# Patient Record
Sex: FEMALE | Race: WHITE | Employment: UNEMPLOYED | URBAN - METROPOLITAN AREA
[De-identification: names, ages, dates, MRNs, and addresses within clinical notes are randomized per-mention and may not be internally consistent; named-entity substitution may affect disease eponyms.]

---

## 2018-07-24 ENCOUNTER — HOSPITAL ENCOUNTER (OUTPATIENT)
Dept: SLEEP CENTER | Age: 53
Discharge: HOME | End: 2018-07-26
Payer: COMMERCIAL

## 2018-07-24 PROCEDURE — 95810 POLYSOM 6/> YRS 4/> PARAM: CPT

## 2018-11-06 ENCOUNTER — HOSPITAL ENCOUNTER (OUTPATIENT)
Dept: SLEEP CENTER | Age: 53
Discharge: HOME | End: 2018-11-08
Payer: COMMERCIAL

## 2018-11-06 PROCEDURE — 95811 POLYSOM 6/>YRS CPAP 4/> PARM: CPT

## 2019-02-13 ENCOUNTER — HOSPITAL ENCOUNTER (OUTPATIENT)
Dept: SLEEP CENTER | Age: 54
Discharge: HOME | End: 2019-02-15
Payer: COMMERCIAL

## 2019-02-13 PROCEDURE — 95811 POLYSOM 6/>YRS CPAP 4/> PARM: CPT

## 2023-01-25 PROBLEM — I73.9 PAD (PERIPHERAL ARTERY DISEASE) (CMS-HCC): Status: ACTIVE | Noted: 2023-01-25

## 2023-01-25 PROBLEM — M25.831 MASS OF JOINT OF RIGHT WRIST: Status: ACTIVE | Noted: 2023-01-25

## 2023-01-25 PROBLEM — I25.10 CAD (CORONARY ARTERY DISEASE): Status: ACTIVE | Noted: 2023-01-25

## 2023-01-25 PROBLEM — E78.5 HYPERLIPIDEMIA: Status: ACTIVE | Noted: 2023-01-25

## 2023-01-25 PROBLEM — G25.81 RESTLESS LEGS SYNDROME: Status: ACTIVE | Noted: 2023-01-25

## 2023-01-25 PROBLEM — B00.9 HSV-2 INFECTION: Status: ACTIVE | Noted: 2023-01-25

## 2023-01-25 PROBLEM — M19.079 1ST MTP ARTHRITIS: Status: ACTIVE | Noted: 2023-01-25

## 2023-01-25 PROBLEM — M25.561 ARTHRALGIA OF RIGHT KNEE: Status: ACTIVE | Noted: 2023-01-25

## 2023-01-25 PROBLEM — I51.7 LVH (LEFT VENTRICULAR HYPERTROPHY): Status: ACTIVE | Noted: 2023-01-25

## 2023-01-25 PROBLEM — M67.439 GANGLION CYST OF WRIST: Status: ACTIVE | Noted: 2023-01-25

## 2023-01-25 PROBLEM — R31.9 HEMATURIA: Status: ACTIVE | Noted: 2023-01-25

## 2023-01-25 PROBLEM — I25.10 CORONARY ARTERY CALCIFICATION SEEN ON CT SCAN: Status: ACTIVE | Noted: 2023-01-25

## 2023-01-25 PROBLEM — G89.29 CHRONIC LOW BACK PAIN WITH SCIATICA: Status: ACTIVE | Noted: 2023-01-25

## 2023-01-25 PROBLEM — D72.820 LYMPHOCYTOSIS: Status: ACTIVE | Noted: 2023-01-25

## 2023-01-25 PROBLEM — I10 ESSENTIAL HYPERTENSION, BENIGN: Status: ACTIVE | Noted: 2023-01-25

## 2023-01-25 PROBLEM — E11.40 CONTROLLED TYPE 2 DIABETES MELLITUS WITH DIABETIC NEUROPATHY, WITHOUT LONG-TERM CURRENT USE OF INSULIN (MULTI): Status: ACTIVE | Noted: 2023-01-25

## 2023-01-25 PROBLEM — K59.09 CHRONIC CONSTIPATION: Status: ACTIVE | Noted: 2023-01-25

## 2023-01-25 PROBLEM — R92.8 ABNORMAL MAMMOGRAM: Status: ACTIVE | Noted: 2023-01-25

## 2023-01-25 PROBLEM — G47.00 INSOMNIA: Status: ACTIVE | Noted: 2023-01-25

## 2023-01-25 PROBLEM — F41.9 ANXIETY AND DEPRESSION: Status: ACTIVE | Noted: 2023-01-25

## 2023-01-25 PROBLEM — E53.8 B12 DEFICIENCY: Status: ACTIVE | Noted: 2023-01-25

## 2023-01-25 PROBLEM — R68.89 FORGETFULNESS: Status: ACTIVE | Noted: 2023-01-25

## 2023-01-25 PROBLEM — M54.40 CHRONIC LOW BACK PAIN WITH SCIATICA: Status: ACTIVE | Noted: 2023-01-25

## 2023-01-25 PROBLEM — R29.818 NEUROGENIC CLAUDICATION: Status: ACTIVE | Noted: 2023-01-25

## 2023-01-25 PROBLEM — J44.9 COPD (CHRONIC OBSTRUCTIVE PULMONARY DISEASE) (MULTI): Status: ACTIVE | Noted: 2023-01-25

## 2023-01-25 PROBLEM — E11.9 TYPE 2 DIABETES MELLITUS (MULTI): Status: ACTIVE | Noted: 2023-01-25

## 2023-01-25 PROBLEM — R26.9 GAIT DISTURBANCE: Status: ACTIVE | Noted: 2023-01-25

## 2023-01-25 PROBLEM — M48.061 LUMBAR SPINAL STENOSIS: Status: ACTIVE | Noted: 2023-01-25

## 2023-01-25 PROBLEM — F31.9 BIPOLAR AFFECTIVE DISORDER (MULTI): Status: ACTIVE | Noted: 2023-01-25

## 2023-01-25 PROBLEM — M72.2 PLANTAR FASCIITIS: Status: ACTIVE | Noted: 2023-01-25

## 2023-01-25 PROBLEM — G62.9 NEUROPATHY: Status: ACTIVE | Noted: 2023-01-25

## 2023-01-25 PROBLEM — G62.9 PERIPHERAL NEUROPATHY: Status: ACTIVE | Noted: 2023-01-25

## 2023-01-25 PROBLEM — F32.A ANXIETY AND DEPRESSION: Status: ACTIVE | Noted: 2023-01-25

## 2023-01-25 RX ORDER — VENLAFAXINE 37.5 MG/1
37.5 TABLET ORAL DAILY
COMMUNITY
End: 2023-05-31

## 2023-01-25 RX ORDER — ALBUTEROL SULFATE 90 UG/1
1-2 AEROSOL, METERED RESPIRATORY (INHALATION)
COMMUNITY
End: 2023-08-21 | Stop reason: SDUPTHER

## 2023-01-25 RX ORDER — DULAGLUTIDE 1.5 MG/.5ML
1.5 INJECTION, SOLUTION SUBCUTANEOUS
COMMUNITY
Start: 2019-08-05 | End: 2023-09-13 | Stop reason: SDUPTHER

## 2023-01-25 RX ORDER — GUAIFENESIN 600 MG/1
600-1200 TABLET, EXTENDED RELEASE ORAL 2 TIMES DAILY PRN
COMMUNITY
Start: 2021-08-25 | End: 2023-05-31

## 2023-01-25 RX ORDER — ISOPROPYL ALCOHOL 70 ML/100ML
SWAB TOPICAL 3 TIMES DAILY
COMMUNITY
End: 2023-05-31

## 2023-01-25 RX ORDER — LANCETS
EACH MISCELLANEOUS 3 TIMES DAILY
COMMUNITY
End: 2024-05-06 | Stop reason: SDUPTHER

## 2023-01-25 RX ORDER — METFORMIN HYDROCHLORIDE 500 MG/1
500 TABLET ORAL DAILY
COMMUNITY
End: 2023-04-27 | Stop reason: SDUPTHER

## 2023-01-25 RX ORDER — LIDOCAINE 50 MG/G
PATCH TOPICAL
COMMUNITY
Start: 2019-08-03 | End: 2023-07-19 | Stop reason: SDUPTHER

## 2023-01-25 RX ORDER — ASPIRIN 81 MG/1
1 TABLET ORAL DAILY
COMMUNITY
Start: 2019-08-05

## 2023-01-25 RX ORDER — QUETIAPINE FUMARATE 50 MG/1
25 TABLET, FILM COATED ORAL NIGHTLY PRN
COMMUNITY
Start: 2022-07-25 | End: 2024-03-20 | Stop reason: WASHOUT

## 2023-01-25 RX ORDER — BLOOD SUGAR DIAGNOSTIC
STRIP MISCELLANEOUS 4 TIMES DAILY
COMMUNITY
Start: 2022-01-21 | End: 2023-03-20 | Stop reason: SDUPTHER

## 2023-01-25 RX ORDER — LOSARTAN POTASSIUM 100 MG/1
1 TABLET ORAL DAILY
COMMUNITY
Start: 2019-08-05 | End: 2023-09-13 | Stop reason: SDUPTHER

## 2023-01-25 RX ORDER — BUPRENORPHINE HYDROCHLORIDE AND NALOXONE HYDROCHLORIDE DIHYDRATE 2; .5 MG/1; MG/1
1 TABLET SUBLINGUAL 2 TIMES DAILY
COMMUNITY
End: 2023-09-28 | Stop reason: ALTCHOICE

## 2023-01-25 RX ORDER — ALPRAZOLAM 0.5 MG/1
0.5 TABLET, ORALLY DISINTEGRATING ORAL 3 TIMES DAILY PRN
COMMUNITY
End: 2024-05-10

## 2023-01-25 RX ORDER — ATORVASTATIN CALCIUM 80 MG/1
1 TABLET, FILM COATED ORAL DAILY
COMMUNITY
Start: 2018-10-23 | End: 2023-08-23 | Stop reason: SDUPTHER

## 2023-01-25 RX ORDER — LANOLIN ALCOHOL/MO/W.PET/CERES
1 CREAM (GRAM) TOPICAL DAILY
COMMUNITY
Start: 2020-04-06

## 2023-01-25 RX ORDER — ACETAMINOPHEN 500 MG
1 TABLET ORAL NIGHTLY
COMMUNITY
Start: 2019-11-25

## 2023-01-25 RX ORDER — GABAPENTIN 800 MG/1
800 TABLET ORAL 4 TIMES DAILY
COMMUNITY
Start: 2015-08-12 | End: 2023-09-15 | Stop reason: SDUPTHER

## 2023-01-25 RX ORDER — POLYETHYLENE GLYCOL 3350 17 G/17G
POWDER, FOR SOLUTION ORAL
COMMUNITY
End: 2023-05-31

## 2023-01-25 RX ORDER — VENLAFAXINE HYDROCHLORIDE 150 MG/1
1 CAPSULE, EXTENDED RELEASE ORAL DAILY
COMMUNITY
Start: 2020-03-27 | End: 2023-09-28 | Stop reason: ALTCHOICE

## 2023-01-25 RX ORDER — TIOTROPIUM BROMIDE INHALATION SPRAY 3.12 UG/1
2 SPRAY, METERED RESPIRATORY (INHALATION)
COMMUNITY
End: 2023-03-14 | Stop reason: SDUPTHER

## 2023-01-25 RX ORDER — ROPINIROLE 1 MG/1
2 TABLET, FILM COATED ORAL NIGHTLY
COMMUNITY
Start: 2020-05-19 | End: 2023-12-19 | Stop reason: SDUPTHER

## 2023-01-25 RX ORDER — ALBUTEROL SULFATE 0.83 MG/ML
1 SOLUTION RESPIRATORY (INHALATION) EVERY 4 HOURS PRN
COMMUNITY
Start: 2018-11-18 | End: 2024-05-23 | Stop reason: SDUPTHER

## 2023-03-08 ENCOUNTER — APPOINTMENT (OUTPATIENT)
Dept: PRIMARY CARE | Facility: CLINIC | Age: 58
End: 2023-03-08
Payer: MEDICARE

## 2023-03-14 DIAGNOSIS — J44.9 CHRONIC OBSTRUCTIVE PULMONARY DISEASE, UNSPECIFIED COPD TYPE (MULTI): ICD-10-CM

## 2023-03-14 RX ORDER — TIOTROPIUM BROMIDE INHALATION SPRAY 3.12 UG/1
2 SPRAY, METERED RESPIRATORY (INHALATION) DAILY
Qty: 8 EACH | Refills: 3 | Status: SHIPPED | OUTPATIENT
Start: 2023-03-14 | End: 2023-09-12

## 2023-03-20 DIAGNOSIS — J44.9 CHRONIC OBSTRUCTIVE PULMONARY DISEASE, UNSPECIFIED COPD TYPE (MULTI): ICD-10-CM

## 2023-03-20 DIAGNOSIS — E11.9 TYPE 2 DIABETES MELLITUS WITHOUT COMPLICATION, UNSPECIFIED WHETHER LONG TERM INSULIN USE (MULTI): ICD-10-CM

## 2023-03-20 RX ORDER — NEBULIZER AND COMPRESSOR
EACH MISCELLANEOUS
COMMUNITY
End: 2023-03-20 | Stop reason: SDUPTHER

## 2023-03-20 RX ORDER — NEBULIZER AND COMPRESSOR
EACH MISCELLANEOUS
Qty: 1 EACH | Refills: 0 | Status: SHIPPED | OUTPATIENT
Start: 2023-03-20 | End: 2024-05-09 | Stop reason: SDUPTHER

## 2023-03-20 RX ORDER — BLOOD SUGAR DIAGNOSTIC
STRIP MISCELLANEOUS
Qty: 100 STRIP | Refills: 3 | Status: SHIPPED | OUTPATIENT
Start: 2023-03-20 | End: 2023-07-17 | Stop reason: SDUPTHER

## 2023-03-28 ENCOUNTER — APPOINTMENT (OUTPATIENT)
Dept: PRIMARY CARE | Facility: CLINIC | Age: 58
End: 2023-03-28
Payer: MEDICARE

## 2023-04-04 ENCOUNTER — APPOINTMENT (OUTPATIENT)
Dept: PRIMARY CARE | Facility: CLINIC | Age: 58
End: 2023-04-04
Payer: MEDICARE

## 2023-04-18 ENCOUNTER — TELEPHONE (OUTPATIENT)
Dept: PRIMARY CARE | Facility: CLINIC | Age: 58
End: 2023-04-18
Payer: MEDICARE

## 2023-04-18 NOTE — TELEPHONE ENCOUNTER
Pt. Called said is having issues with transportation,  she would like to know if she can been seen first thing in the morning.  Please Advice.

## 2023-04-18 NOTE — TELEPHONE ENCOUNTER
Pt called asking for an order for annual lung cancer screening. She received a letter in the mail notifying her she is due for one.

## 2023-04-24 ENCOUNTER — TELEPHONE (OUTPATIENT)
Dept: PRIMARY CARE | Facility: CLINIC | Age: 58
End: 2023-04-24
Payer: MEDICARE

## 2023-04-25 ENCOUNTER — TELEPHONE (OUTPATIENT)
Dept: PRIMARY CARE | Facility: CLINIC | Age: 58
End: 2023-04-25
Payer: MEDICARE

## 2023-04-25 NOTE — TELEPHONE ENCOUNTER
Pt. Called said Dr. Bauer Sent her Antibiotics last time for a bump on her but cheeks, said if he can send them again.  She has an appt. May 11th at 11:40am    Temporary Phone#  [phone]    Please Advice.

## 2023-04-26 RX ORDER — METFORMIN HYDROCHLORIDE 500 MG/1
500 TABLET ORAL DAILY
Qty: 90 TABLET | Refills: 3 | Status: CANCELLED | OUTPATIENT
Start: 2023-04-26

## 2023-04-27 ENCOUNTER — TELEMEDICINE (OUTPATIENT)
Dept: PRIMARY CARE | Facility: CLINIC | Age: 58
End: 2023-04-27
Payer: MEDICARE

## 2023-04-27 DIAGNOSIS — E11.9 TYPE 2 DIABETES MELLITUS WITHOUT COMPLICATION, WITHOUT LONG-TERM CURRENT USE OF INSULIN (MULTI): ICD-10-CM

## 2023-04-27 DIAGNOSIS — B00.9 HSV-2 INFECTION: Primary | ICD-10-CM

## 2023-04-27 PROCEDURE — 99213 OFFICE O/P EST LOW 20 MIN: CPT | Performed by: FAMILY MEDICINE

## 2023-04-27 RX ORDER — VALACYCLOVIR HYDROCHLORIDE 1 G/1
1000 TABLET, FILM COATED ORAL 3 TIMES DAILY
Qty: 21 TABLET | Refills: 0 | Status: SHIPPED | OUTPATIENT
Start: 2023-04-27 | End: 2023-11-07 | Stop reason: SDUPTHER

## 2023-04-27 RX ORDER — METFORMIN HYDROCHLORIDE 1000 MG/1
1000 TABLET ORAL DAILY
Qty: 90 TABLET | Refills: 3 | Status: SHIPPED | OUTPATIENT
Start: 2023-04-27 | End: 2023-06-30 | Stop reason: SDUPTHER

## 2023-04-27 NOTE — PROGRESS NOTES
Subjective   Patient ID: Rachael Ruiz is a 57 y.o. female who presents for No chief complaint on file..    HPI     She developed a painful, crusted bump in the area of her upper buttocks at midline 3 to 4 days ago.  There has been a small amount of drainage from the area.  No fever.  She has a history of similar symptoms in the past due to cutaneous HSV infection.  We had initially seen her in 2019 for similar symptoms and she had a skin culture done at that time which was positive for HSV type II.  Since then she has had 3-4 similar episodes, which have improved with treatment with valacyclovir    Review of Systems   Skin:  Positive for rash.       Objective   There were no vitals taken for this visit.    Physical Exam  Today's visit was virtual.  Physical exam was not done due to this      Assessment/Plan   Problem List Items Addressed This Visit          Medium    HSV-2 infection - Primary    Relevant Medications    valACYclovir (Valtrex) 1 gram tablet    Type 2 diabetes mellitus (CMS/[facility])    Relevant Medications    metFORMIN (Glucophage) 1,000 mg tablet   She presents today with a 3 to 4-day history of a tender crusted rash involving upper buttocks area at midline.  I was not able to examine the area today since the visit was done virtually, however she does have a history of similar symptoms in the past due to recurrent cutaneous HSV in the area.  This has responded well to treatment with valacyclovir in the past.  We will treat with valacyclovir 1 g 3 times daily for 7 days.  Advised to follow-up in the next week if not improved  She is also requesting a refill on metformin for diabetes today.  Refill was given.  She has a follow-up scheduled next month for a CPE and diabetes recheck, and she will keep this appointment

## 2023-05-11 ENCOUNTER — APPOINTMENT (OUTPATIENT)
Dept: PRIMARY CARE | Facility: CLINIC | Age: 58
End: 2023-05-11
Payer: MEDICARE

## 2023-05-16 ENCOUNTER — APPOINTMENT (OUTPATIENT)
Dept: PRIMARY CARE | Facility: CLINIC | Age: 58
End: 2023-05-16
Payer: MEDICARE

## 2023-05-28 ASSESSMENT — ENCOUNTER SYMPTOMS
SPEECH DIFFICULTY: 0
POLYDIPSIA: 1
CONFUSION: 0
HEADACHES: 0
POLYPHAGIA: 0
VISUAL CHANGE: 1
SWEATS: 1
BLACKOUTS: 0
SEIZURES: 0
WEAKNESS: 1
HUNGER: 0
FATIGUE: 0
TREMORS: 0
WEIGHT LOSS: 0
BLURRED VISION: 0
DIZZINESS: 0
NERVOUS/ANXIOUS: 1

## 2023-05-31 ENCOUNTER — APPOINTMENT (OUTPATIENT)
Dept: PRIMARY CARE | Facility: CLINIC | Age: 58
End: 2023-05-31
Payer: MEDICARE

## 2023-05-31 PROBLEM — G62.9 NEUROPATHY: Status: RESOLVED | Noted: 2023-01-25 | Resolved: 2023-05-31

## 2023-05-31 ASSESSMENT — ENCOUNTER SYMPTOMS
WEIGHT LOSS: 0
SPEECH DIFFICULTY: 0
TREMORS: 0
DIZZINESS: 0
POLYPHAGIA: 0
BLACKOUTS: 0
HUNGER: 0
FATIGUE: 0
BLURRED VISION: 0
SEIZURES: 0
SWEATS: 1
POLYDIPSIA: 1
NERVOUS/ANXIOUS: 1
POLYPHAGIA: 0
POLYDIPSIA: 1
WEIGHT LOSS: 0
CONFUSION: 0
TREMORS: 0
FATIGUE: 0
BLACKOUTS: 0
WEAKNESS: 1
SWEATS: 1
VISUAL CHANGE: 1
CONFUSION: 0
VISUAL CHANGE: 1
SEIZURES: 0
HUNGER: 0
BLURRED VISION: 0
WEAKNESS: 1
HEADACHES: 0
NERVOUS/ANXIOUS: 1
DIZZINESS: 0
HEADACHES: 0
SPEECH DIFFICULTY: 0

## 2023-06-06 ENCOUNTER — TELEPHONE (OUTPATIENT)
Dept: PRIMARY CARE | Facility: CLINIC | Age: 58
End: 2023-06-06

## 2023-06-06 ENCOUNTER — APPOINTMENT (OUTPATIENT)
Dept: PRIMARY CARE | Facility: CLINIC | Age: 58
End: 2023-06-06
Payer: MEDICARE

## 2023-06-06 ENCOUNTER — TELEMEDICINE (OUTPATIENT)
Dept: PRIMARY CARE | Facility: CLINIC | Age: 58
End: 2023-06-06
Payer: MEDICARE

## 2023-06-06 DIAGNOSIS — U07.1 COVID-19 VIRUS INFECTION: Primary | ICD-10-CM

## 2023-06-06 PROCEDURE — 99442 PR PHYS/QHP TELEPHONE EVALUATION 11-20 MIN: CPT | Performed by: FAMILY MEDICINE

## 2023-06-06 ASSESSMENT — ENCOUNTER SYMPTOMS
BLACKOUTS: 0
CONFUSION: 0
BLURRED VISION: 0
POLYPHAGIA: 0
SEIZURES: 0
TREMORS: 0
HUNGER: 0
COUGH: 1
VISUAL CHANGE: 1
SWEATS: 1
HEADACHES: 0
WEAKNESS: 1
SPEECH DIFFICULTY: 0
WEIGHT LOSS: 0
NERVOUS/ANXIOUS: 1
POLYDIPSIA: 1
HEADACHES: 0
DIZZINESS: 0
FATIGUE: 0

## 2023-06-06 NOTE — PROGRESS NOTES
Subjective   Patient ID: Rachael Ruiz is a 57 y.o. female who presents for Covid-19 Testing.    URI   This is a new problem. The current episode started in the past 7 days. There has been no fever. Associated symptoms include congestion and coughing. Pertinent negatives include no headaches or sneezing.      Here today to discuss positive COVID test  Her symptoms started 4 to 5 days ago.  Main symptoms have been nasal congestion and cough.  She had a 101 degree fever, but states that the fever has resolved.  She otherwise denies any nasal drainage, wheezing, shortness of breath, chest pain, leg edema, headache or muscle pain.  Symptoms are starting to improve  She has been vaccinated against COVID but did not receive the most recent booster  Past medical history is significant for COPD and diabetes mellitus.  She is a smoker          Review of Systems   HENT:  Positive for congestion. Negative for sneezing.    Respiratory:  Positive for cough.    Neurological:  Negative for headaches.       Objective   There were no vitals taken for this visit.    Physical Exam today's visit was done over the telephone.  Exam was not done due to this    Assessment/Plan   Problem List Items Addressed This Visit    None  Visit Diagnoses       COVID-19 virus infection    -  Primary        She is currently on day 4-5 of symptoms.  She states that her symptoms have been mild and improving.  She is still within the antiviral treatment window, and is at higher risk of more severe infection based on her history including diabetes mellitus, COPD and smoking status.  We discussed antiviral treatment with Paxlovid in detail, including medication interactions and efficacy.  After discussing this, she declines treatment.  I did recommend that she go to the ER if any chest pain, shortness of breath, leg swelling or worsening symptoms, and follow-up with us in the next 3 to 5 days if not improving.  We also discussed home quarantine  guidelines.  Total time spent today on telephone was 11 to 12 minutes

## 2023-06-06 NOTE — TELEPHONE ENCOUNTER
Pt called to let you know she rescheduled her appt for today because she has covid. She tested positive with an at home test last Friday. She states she is overall feeling ok but has some chest congestion and coughing up phlegm.

## 2023-06-15 ENCOUNTER — APPOINTMENT (OUTPATIENT)
Dept: PRIMARY CARE | Facility: CLINIC | Age: 58
End: 2023-06-15
Payer: MEDICARE

## 2023-06-15 ENCOUNTER — PATIENT OUTREACH (OUTPATIENT)
Dept: PRIMARY CARE | Facility: CLINIC | Age: 58
End: 2023-06-15

## 2023-06-15 DIAGNOSIS — M54.50 CHRONIC LOW BACK PAIN, UNSPECIFIED BACK PAIN LATERALITY, UNSPECIFIED WHETHER SCIATICA PRESENT: ICD-10-CM

## 2023-06-15 DIAGNOSIS — E11.40 CONTROLLED TYPE 2 DIABETES MELLITUS WITH DIABETIC NEUROPATHY, WITHOUT LONG-TERM CURRENT USE OF INSULIN (MULTI): ICD-10-CM

## 2023-06-15 DIAGNOSIS — G89.29 CHRONIC LOW BACK PAIN, UNSPECIFIED BACK PAIN LATERALITY, UNSPECIFIED WHETHER SCIATICA PRESENT: ICD-10-CM

## 2023-06-15 DIAGNOSIS — J44.9 CHRONIC OBSTRUCTIVE PULMONARY DISEASE, UNSPECIFIED COPD TYPE (MULTI): ICD-10-CM

## 2023-06-15 DIAGNOSIS — R29.6 FREQUENT FALLS: ICD-10-CM

## 2023-06-15 DIAGNOSIS — E11.9 TYPE 2 DIABETES MELLITUS WITHOUT COMPLICATION, WITHOUT LONG-TERM CURRENT USE OF INSULIN (MULTI): Primary | ICD-10-CM

## 2023-06-15 DIAGNOSIS — I10 HYPERTENSION, UNSPECIFIED TYPE: ICD-10-CM

## 2023-06-15 NOTE — PROGRESS NOTES
Discharge Facility: [facility]  Discharge Diagnosis: HTN, B12 deficiency, Anxiety, Depression, CAD, Chronic low back pain, COPD, DM2, RLS  Admission Date: 6/12/2023  Discharge Date: 6/14/2023    PCP Appointment Date: 6/22/2023 14:20  Specialist Appointment Date: Dr. Coyel, Orthopedics to be scheduled  Hospital Encounter and Summary: Linked     Wrap Up  Wrap Up Additional Comments: Admitted for hypertensive urgency, frequent falls, Covid infection and R/O esophageal injury. HTN urgency resolved. CT chest positve for esophageal injury, repeat imaging ruled out. Covid positive, asymptomatic. Pt to FU with GI for possible endoscopy once Covid resolved. Pt also diagnosed with chronic low back pain. Ortho referral made for back pain and [facility] PT/OT ordered for frequent falls. Pt to conitnue on depression meds for anixety. (6/15/2023 12:21 PM)

## 2023-06-20 ENCOUNTER — TELEPHONE (OUTPATIENT)
Dept: PRIMARY CARE | Facility: CLINIC | Age: 58
End: 2023-06-20

## 2023-06-20 ENCOUNTER — APPOINTMENT (OUTPATIENT)
Dept: PHARMACY | Facility: HOSPITAL | Age: 58
End: 2023-06-20
Payer: MEDICARE

## 2023-06-20 NOTE — PROGRESS NOTES
Subjective   Patient ID: Rachael Ruiz is a 57 y.o. female who presents for No chief complaint on file..    Referring Provider: Pineda Bauer DO         Objective     There were no vitals taken for this visit.     Labs  Lab Results   Component Value Date    BILITOT 0.5 06/14/2023    CALCIUM 8.4 (L) 06/14/2023    CO2 21 06/14/2023     (H) 06/14/2023    CREATININE 0.72 06/14/2023    GLUCOSE 152 (H) 06/14/2023    ALKPHOS 81 06/14/2023    K 3.2 (L) 06/14/2023    PROT 6.8 06/14/2023     06/14/2023    AST 18 06/14/2023    ALT 14 06/14/2023    BUN 12 06/14/2023    ANIONGAP 11 06/14/2023    MG 1.85 06/14/2023    PHOS CANCELED 06/13/2023    ALBUMIN 3.6 06/14/2023    GFRF >90 06/14/2023    GFRMALE CANCELED 06/13/2023     Lab Results   Component Value Date    TRIG 313 (H) 12/05/2022    CHOL 168 12/05/2022    HDL 26.1 (A) 12/05/2022     Lab Results   Component Value Date    HGBA1C 7.2 (A) 06/13/2023    HGBA1C 6.3 (A) 12/05/2022    HGBA1C 6.6 08/01/2019       Current Outpatient Medications on File Prior to Visit   Medication Sig Dispense Refill    albuterol 2.5 mg /3 mL (0.083 %) nebulizer solution Take 1 ampule by nebulization every 4 (four) hours if needed.      albuterol 90 mcg/actuation inhaler Inhale 1-2 puffs. Every 4-6 hours if needed      ALPRAZolam (Niravam) 0.5 mg disintegrating tablet Take 1 tablet (0.5 mg) by mouth 3 times a day as needed. Allow to dissolve      aspirin 81 mg EC tablet Take 1 tablet (81 mg) by mouth once daily.      atorvastatin (Lipitor) 80 mg tablet Take 1 tablet (80 mg) by mouth once daily.      buprenorphine-naloxone (Suboxone) 2-0.5 mg SL tablet Place 1 tablet under the tongue twice a day.      cholecalciferol (Vitamin D-3) 50 mcg (2,000 unit) capsule Take 1 capsule (50 mcg) by mouth once daily at bedtime.      cyanocobalamin (Vitamin B-12) 1,000 mcg tablet Take 1 tablet (1,000 mcg) by mouth once daily.      docosahexaenoic acid/epa (FISH OIL ORAL) Take 1,000 mg by mouth in the  morning and at bedtime.      dulaglutide (Trulicity) 1.5 mg/0.5 mL pen injector Inject 1.5 mg under the skin 1 (one) time per week.      gabapentin (Neurontin) 800 mg tablet Take 1 tablet (800 mg) by mouth 4 times a day.      lancets misc in the morning, at noon, and at bedtime.      lidocaine (Lidoderm) 5 % patch APPLY 1 PATCH TO THE AFFECTED AREA AND LEAVE IN PLACE FOR 12 HOURS, THEN REMOVE AND LEAVE OFF FOR 12 HOURS.      losartan (Cozaar) 100 mg tablet Take 1 tablet (100 mg) by mouth once daily.      metFORMIN (Glucophage) 1,000 mg tablet Take 1 tablet (1,000 mg) by mouth once daily. 90 tablet 3    nebulizer and compressor device Use as directed. 1 each 0    OneTouch Ultra Test strip TEST 4 TIMES DAILY 100 strip 3    QUEtiapine (SEROquel) 50 mg tablet Take 1 tablet (50 mg) by mouth as needed at bedtime.      rOPINIRole (Requip) 1 mg tablet Take 2 tablets (2 mg) by mouth once daily at bedtime.      tiotropium (Spiriva Respimat) 2.5 mcg/actuation inhaler Inhale 2 puffs once daily. 90 day supply 8 each 3    venlafaxine XR (Effexor-XR) 150 mg 24 hr capsule Take 1 capsule (150 mg) by mouth once daily.       No current facility-administered medications on file prior to visit.      Patient Goals  - Fasting B - 130 mg/dL  - Postprandial BG: less than 180 mg/dL  - A1c: less than 7%    SMBG      Diet      Exercise    Assessment/Plan   Plan  Diabetes is controlled per last A1c of 6.6%, which is an decrease from 9.3%, and below goal A1c of 7.0%  Continue all current diabetes medications  A1c lab ordered.    Next PCP appointment 23  Follow up with PharmD after A1c lab, 23    Tavares Moise, Tony    Continue all meds under the continuation of care with the referring provider and clinical pharmacy team.

## 2023-06-20 NOTE — TELEPHONE ENCOUNTER
Lizz, a nurse, called regarding pt. She is currently at the pt's house and stated pt's BP is 150/104 currently. Pt does not have any chest pain or headaches but does admit to sweats. She was seen in the hospital 6/12/23 for back pain but was hypertensive while there and they had trouble managing BP. She was started on Hydralazine 50mg but she has not started in since being discharged, she is still taking Losartan for BP.     Nurse notified that she has not been seen in the office in greater than 6 months and cannot adjust meds until seeing pt. If BP does get higher or she does develop symptoms, pt is aware to go to ER. Pt advised to keep follow up appt with you on Thursday and I expressed importance of coming to this appt to follow up.

## 2023-06-22 ENCOUNTER — APPOINTMENT (OUTPATIENT)
Dept: PRIMARY CARE | Facility: CLINIC | Age: 58
End: 2023-06-22
Payer: MEDICARE

## 2023-06-22 PROBLEM — E11.40 CONTROLLED TYPE 2 DIABETES MELLITUS WITH DIABETIC NEUROPATHY, WITHOUT LONG-TERM CURRENT USE OF INSULIN (MULTI): Status: RESOLVED | Noted: 2023-01-25 | Resolved: 2023-06-22

## 2023-06-22 PROBLEM — S22.000A COMPRESSION FRACTURE OF THORACIC VERTEBRA (MULTI): Status: ACTIVE | Noted: 2023-06-22

## 2023-06-22 ASSESSMENT — ENCOUNTER SYMPTOMS
CONFUSION: 0
HEADACHES: 0
VISUAL CHANGE: 1
SEIZURES: 0
BLACKOUTS: 0
POLYDIPSIA: 1
HUNGER: 0
SWEATS: 1
POLYPHAGIA: 0
NERVOUS/ANXIOUS: 1
DIZZINESS: 0
BLURRED VISION: 0
WEAKNESS: 1
SPEECH DIFFICULTY: 0
TREMORS: 0
WEIGHT LOSS: 0
FATIGUE: 0

## 2023-06-22 NOTE — PROGRESS NOTES
Subjective   Patient ID: Rachael Ruiz is a 57 y.o. female who presents for No chief complaint on file..    Diabetes  She has type 2 diabetes mellitus. No MedicAlert identification noted. The initial diagnosis of diabetes was made 40 years ago. Hypoglycemia symptoms include mood changes, nervousness/anxiousness and sweats. Pertinent negatives for hypoglycemia include no confusion, dizziness, headaches, hunger, pallor, seizures, sleepiness, speech difficulty or tremors. Associated symptoms include foot paresthesias, polydipsia, visual change and weakness. Pertinent negatives for diabetes include no blurred vision, no chest pain, no fatigue, no polyphagia, no polyuria and no weight loss. Pertinent negatives for hypoglycemia complications include no blackouts, no hospitalization, no nocturnal hypoglycemia, no required assistance and no required glucagon injection. Symptoms are worsening. Diabetic complications include peripheral neuropathy, PVD and retinopathy. Pertinent negatives for diabetic complications include no CVA, heart disease, impotence or nephropathy. Risk factors for coronary artery disease include dyslipidemia, family history, hypertension, obesity, sedentary lifestyle, stress and tobacco exposure. Current diabetic treatment includes oral agent (dual therapy). She is compliant with treatment none of the time. Her weight is stable. She is following a generally unhealthy diet. When asked about meal planning, she reported none. She has not had a previous visit with a dietitian. She never participates in exercise. She monitors blood glucose at home 3-4 x per day. She monitors urine at home <1 x per month. Blood glucose monitoring compliance is adequate. Her home blood glucose trend is fluctuating dramatically. Her breakfast blood glucose is taken between 7-8 am. Her breakfast blood glucose range is generally <70 mg/dl. Her lunch blood glucose is taken between 11-12 pm. Her lunch blood glucose range is  generally 110-130 mg/dl. Her dinner blood glucose is taken between 4-5 pm. Her dinner blood glucose range is generally  mg/dl. Her overall blood glucose range is 70-90 mg/dl. She sees a podiatrist.Eye exam is current.        She is here today for routine follow-up, annual wellness visit, and also follow-up hospitalization    Past medical history is as follows:  Diabetes mellitus: Currently taking metformin 1000 mg twice daily and Trulicity 1.5 mg weekly.  Her most recent A1c done 6/13/2023 was 7.2%.  A1c done prior to this in December 2022 had been 6.3%  Hyperlipidemia: Currently takes atorvastatin 80 mg daily.  Her last lipid panel done 12/5/2022 showed total cholesterol 168, HDL 26.1, LDL 79, triglycerides 313  Hypertension: Currently takes losartan 100 mg daily and HCTZ 25 mg daily  Vitamin B12 deficiency currently on monthly B12 injections.  Her last vitamin B-12 level done 12/5/2022 was greater than 2000  She follows with psychiatry due to history of bipolar disorder  Follows with cardiology for LVH and history of severe coronary artery calcification seen on previous low-dose CT scan.  She last saw her cardiologist in November and a stress test had been recommended  COPD: Currently taking Spiriva  She has chronic low back pain with history of laminectomy in April 2019  Follows with neurology due to chronic neuropathy involving bilateral lower extremities    She is a smoker.  Has smoked approximately 1 pack/day for 40 years  Colon cancer screening: Had a negative Cologuard 9/2021  Last mammogram was done 12/2021.  This was category 2  Lung cancer screening: No worrisome lung nodules seen on CT chest 6/13/2023  Vaccinations: Received tetanus vaccine 2021.  Received PPSV23 vaccine in 2021      She was recently hospitalized 6/12 through 6/14/2023  Prior to hospitalization she had presented to the ED with 2 days of sweating, as well as a fall 2 days prior.  Labs done in the ED showed elevated white blood cell  count at 14.6.  She had a positive COVID swab.  CT head, lumbar spine and cervical spine were unremarkable  CT of the thoracic spine showed mild compression deformities of the T3 and T4 vertebral bodies.  They had planned on discharging her home with recommendations to follow-up with PCP and orthopedics for the compression deformity, however she had then became more diaphoretic, blood pressure had increased, and she developed worsening abdominal pain.  A CTA of the chest, abdomen and pelvis was then ordered.  This showed a subtle paravertebral abnormality T1-T3 potentially indicating hematoma in the setting of trauma, however esophageal injury was not excluded.  She was admitted for further evaluation  These findings were discussed with CT surgery and GI and an esophagram was recommended.  She had a barium swallow done on 6/13 which did not show any evidence of contrast extra vacation or esophageal perforation  She had an MRI of her thoracic spine done on 6/13 which showed prevertebral edema at see 7-T4, nonspecific.  Major ligamentous structures intact.  Endplate edema in the lower cervical and upper thoracic spine is favored to be degenerative, however microtrabecular injury cannot be excluded  During her stay she was prophylactically treated with fluconazole, cefepime and Zosyn.  GI had recommended outpatient follow-up for possible EGD several weeks after discharge  On discharge it was recommended that she follow-up with PCP, GI, as well as orthopedic surgery        Review of Systems   Constitutional:  Negative for fatigue and weight loss.   Eyes:  Negative for blurred vision.   Cardiovascular:  Negative for chest pain.   Endocrine: Positive for polydipsia. Negative for polyphagia and polyuria.   Genitourinary:  Negative for impotence.   Skin:  Negative for pallor.   Neurological:  Positive for weakness. Negative for dizziness, tremors, seizures, speech difficulty and headaches.   Psychiatric/Behavioral:   Negative for confusion. The patient is nervous/anxious.        Objective   There were no vitals taken for this visit.    Physical Exam    Assessment/Plan   Problem List Items Addressed This Visit          Medium    Compression fracture of thoracic vertebra (CMS/HCC)    Essential hypertension, benign    Hyperlipidemia    Type 2 diabetes mellitus (CMS/HCC)     Other Visit Diagnoses       Routine health maintenance    -  Primary          #1 health maintenance  Up-to-date on colon cancer screening  She is due for a screening mammogram.  This was ordered today  Up-to-date on lung cancer screening

## 2023-06-23 ENCOUNTER — TELEPHONE (OUTPATIENT)
Dept: PRIMARY CARE | Facility: CLINIC | Age: 58
End: 2023-06-23
Payer: MEDICARE

## 2023-06-23 NOTE — TELEPHONE ENCOUNTER
Neftaly a physical therapist called regarding services for pt. Pt is requesting a PT home care evaluation Monday 6/26 and Neftaly confirmed that pt can start in home nursing services.       Any questions or concerns, Neftaly's call back [phone]

## 2023-06-26 ENCOUNTER — TELEPHONE (OUTPATIENT)
Dept: PHARMACY | Facility: HOSPITAL | Age: 58
End: 2023-06-26

## 2023-06-26 ENCOUNTER — APPOINTMENT (OUTPATIENT)
Dept: PHARMACY | Facility: HOSPITAL | Age: 58
End: 2023-06-26
Payer: MEDICARE

## 2023-06-26 NOTE — TELEPHONE ENCOUNTER
Rachael Ruiz was referred to the [facility] program by Pineda Bauer DO. This program, facilitated through the PCI/Pharmacist consult agreement, aims to reduce future hospitalizations associated with chronic conditions through medication optimization and monitoring post-discharge, as well as in between their regularly scheduled appointments.    First Attempt: 6/26/23 at 2:00 PM  Second Attempt: 6/26/23 at 4:40 PM Patient not feeling well. Would like to reschedule for Wednesday 7/3/23 at 2pm      Please let Clinical Pharmacy team know if you have any questions.    Thank you,  Heather Cordova, PharmD

## 2023-06-27 ENCOUNTER — APPOINTMENT (OUTPATIENT)
Dept: PRIMARY CARE | Facility: CLINIC | Age: 58
End: 2023-06-27
Payer: MEDICARE

## 2023-06-28 ENCOUNTER — TELEPHONE (OUTPATIENT)
Dept: PRIMARY CARE | Facility: CLINIC | Age: 58
End: 2023-06-28

## 2023-06-28 ENCOUNTER — APPOINTMENT (OUTPATIENT)
Dept: PRIMARY CARE | Facility: CLINIC | Age: 58
End: 2023-06-28
Payer: MEDICARE

## 2023-06-28 ASSESSMENT — ENCOUNTER SYMPTOMS
SWEATS: 1
VISUAL CHANGE: 1
DIZZINESS: 0
NERVOUS/ANXIOUS: 1
VISUAL CHANGE: 1
NERVOUS/ANXIOUS: 1
TREMORS: 0
BLURRED VISION: 0
WEAKNESS: 1
POLYDIPSIA: 1
WEAKNESS: 1
POLYDIPSIA: 1
POLYDIPSIA: 1
BLACKOUTS: 0
BLACKOUTS: 0
SEIZURES: 0
CONFUSION: 0
WEAKNESS: 1
BLACKOUTS: 0
POLYPHAGIA: 0
WEIGHT LOSS: 0
SWEATS: 1
FATIGUE: 0
SPEECH DIFFICULTY: 0
CONFUSION: 0
DIZZINESS: 0
WEIGHT LOSS: 0
SEIZURES: 0
BLURRED VISION: 0
FATIGUE: 0
TREMORS: 0
SWEATS: 1
TREMORS: 0
CONFUSION: 0
HEADACHES: 0
POLYPHAGIA: 0
SPEECH DIFFICULTY: 0
WEIGHT LOSS: 0
HUNGER: 0
SEIZURES: 0
POLYPHAGIA: 0
HUNGER: 0
VISUAL CHANGE: 1
HEADACHES: 0
FATIGUE: 0
BLURRED VISION: 0
NERVOUS/ANXIOUS: 1
HUNGER: 0
HEADACHES: 0
DIZZINESS: 0
SPEECH DIFFICULTY: 0

## 2023-06-28 NOTE — TELEPHONE ENCOUNTER
Please call her.  She missed her appointment today.  She has either missed or rescheduled this appointment numerous times over the last several months, and is overdue for a follow-up.  Please make sure that she reschedules this appointment and keeps this appointment.  It is hard to take care of her if she does not come in as directed on a regular basis

## 2023-06-30 ENCOUNTER — OFFICE VISIT (OUTPATIENT)
Dept: PRIMARY CARE | Facility: CLINIC | Age: 58
End: 2023-06-30
Payer: MEDICARE

## 2023-06-30 VITALS
DIASTOLIC BLOOD PRESSURE: 78 MMHG | WEIGHT: 160 LBS | RESPIRATION RATE: 20 BRPM | HEART RATE: 82 BPM | SYSTOLIC BLOOD PRESSURE: 155 MMHG | HEIGHT: 60 IN | BODY MASS INDEX: 31.41 KG/M2 | OXYGEN SATURATION: 96 %

## 2023-06-30 DIAGNOSIS — Z12.31 BREAST CANCER SCREENING BY MAMMOGRAM: ICD-10-CM

## 2023-06-30 DIAGNOSIS — Z72.0 TOBACCO USE: ICD-10-CM

## 2023-06-30 DIAGNOSIS — E53.8 B12 DEFICIENCY: ICD-10-CM

## 2023-06-30 DIAGNOSIS — I10 ESSENTIAL HYPERTENSION, BENIGN: ICD-10-CM

## 2023-06-30 DIAGNOSIS — S22.000A COMPRESSION FRACTURE OF THORACIC VERTEBRA, UNSPECIFIED THORACIC VERTEBRAL LEVEL, INITIAL ENCOUNTER (MULTI): ICD-10-CM

## 2023-06-30 DIAGNOSIS — J44.9 CHRONIC OBSTRUCTIVE PULMONARY DISEASE, UNSPECIFIED COPD TYPE (MULTI): ICD-10-CM

## 2023-06-30 DIAGNOSIS — E78.5 HYPERLIPIDEMIA, UNSPECIFIED HYPERLIPIDEMIA TYPE: ICD-10-CM

## 2023-06-30 DIAGNOSIS — M46.1 SACROILIITIS, NOT ELSEWHERE CLASSIFIED (CMS-HCC): ICD-10-CM

## 2023-06-30 DIAGNOSIS — E11.9 TYPE 2 DIABETES MELLITUS WITHOUT COMPLICATION, WITHOUT LONG-TERM CURRENT USE OF INSULIN (MULTI): ICD-10-CM

## 2023-06-30 DIAGNOSIS — Z00.00 ROUTINE HEALTH MAINTENANCE: Primary | ICD-10-CM

## 2023-06-30 DIAGNOSIS — Z00.00 ROUTINE GENERAL MEDICAL EXAMINATION AT HEALTH CARE FACILITY: ICD-10-CM

## 2023-06-30 DIAGNOSIS — E11.42 TYPE 2 DIABETES MELLITUS WITH DIABETIC POLYNEUROPATHY, WITHOUT LONG-TERM CURRENT USE OF INSULIN (MULTI): ICD-10-CM

## 2023-06-30 DIAGNOSIS — F11.21 OPIOID DEPENDENCE, IN REMISSION (MULTI): ICD-10-CM

## 2023-06-30 PROCEDURE — 3077F SYST BP >= 140 MM HG: CPT | Performed by: FAMILY MEDICINE

## 2023-06-30 PROCEDURE — 3078F DIAST BP <80 MM HG: CPT | Performed by: FAMILY MEDICINE

## 2023-06-30 PROCEDURE — G0439 PPPS, SUBSEQ VISIT: HCPCS | Performed by: FAMILY MEDICINE

## 2023-06-30 PROCEDURE — 3051F HG A1C>EQUAL 7.0%<8.0%: CPT | Performed by: FAMILY MEDICINE

## 2023-06-30 PROCEDURE — G0009 ADMIN PNEUMOCOCCAL VACCINE: HCPCS | Performed by: FAMILY MEDICINE

## 2023-06-30 PROCEDURE — 3008F BODY MASS INDEX DOCD: CPT | Performed by: FAMILY MEDICINE

## 2023-06-30 PROCEDURE — 3066F NEPHROPATHY DOC TX: CPT | Performed by: FAMILY MEDICINE

## 2023-06-30 PROCEDURE — 90677 PCV20 VACCINE IM: CPT | Performed by: FAMILY MEDICINE

## 2023-06-30 PROCEDURE — 4010F ACE/ARB THERAPY RXD/TAKEN: CPT | Performed by: FAMILY MEDICINE

## 2023-06-30 PROCEDURE — 99214 OFFICE O/P EST MOD 30 MIN: CPT | Performed by: FAMILY MEDICINE

## 2023-06-30 RX ORDER — METFORMIN HYDROCHLORIDE 1000 MG/1
1000 TABLET ORAL
Qty: 180 TABLET | Refills: 3 | Status: SHIPPED | OUTPATIENT
Start: 2023-06-30 | End: 2024-05-06 | Stop reason: SDUPTHER

## 2023-06-30 RX ORDER — HYDRALAZINE HYDROCHLORIDE 50 MG/1
50 TABLET, FILM COATED ORAL EVERY 12 HOURS
COMMUNITY
Start: 2023-06-14 | End: 2023-07-19 | Stop reason: SDUPTHER

## 2023-06-30 RX ORDER — HYDROCHLOROTHIAZIDE 12.5 MG/1
12.5 TABLET ORAL DAILY
Qty: 30 TABLET | Refills: 5 | Status: SHIPPED | OUTPATIENT
Start: 2023-06-30 | End: 2023-12-14

## 2023-06-30 ASSESSMENT — ENCOUNTER SYMPTOMS
TREMORS: 0
HUNGER: 0
HYPERTENSION: 1
VISUAL CHANGE: 1
BACK PAIN: 1
POLYPHAGIA: 0
BLURRED VISION: 0
CONFUSION: 0
COUGH: 0
HEADACHES: 0
WHEEZING: 0
NERVOUS/ANXIOUS: 1
SPEECH DIFFICULTY: 0
DIZZINESS: 0
WEIGHT LOSS: 0
FATIGUE: 0
WEAKNESS: 1
POLYDIPSIA: 1
SEIZURES: 0
BLACKOUTS: 0
NUMBNESS: 1
SWEATS: 1

## 2023-06-30 ASSESSMENT — ACTIVITIES OF DAILY LIVING (ADL)
MANAGING_FINANCES: INDEPENDENT
DOING_HOUSEWORK: INDEPENDENT
GROCERY_SHOPPING: INDEPENDENT
TAKING_MEDICATION: INDEPENDENT
BATHING: INDEPENDENT
DRESSING: INDEPENDENT

## 2023-06-30 ASSESSMENT — PATIENT HEALTH QUESTIONNAIRE - PHQ9
2. FEELING DOWN, DEPRESSED OR HOPELESS: SEVERAL DAYS
7. TROUBLE CONCENTRATING ON THINGS, SUCH AS READING THE NEWSPAPER OR WATCHING TELEVISION: NEARLY EVERY DAY
SUM OF ALL RESPONSES TO PHQ9 QUESTIONS 1 AND 2: 4
1. LITTLE INTEREST OR PLEASURE IN DOING THINGS: NEARLY EVERY DAY
8. MOVING OR SPEAKING SO SLOWLY THAT OTHER PEOPLE COULD HAVE NOTICED. OR THE OPPOSITE, BEING SO FIGETY OR RESTLESS THAT YOU HAVE BEEN MOVING AROUND A LOT MORE THAN USUAL: NOT AT ALL
6. FEELING BAD ABOUT YOURSELF - OR THAT YOU ARE A FAILURE OR HAVE LET YOURSELF OR YOUR FAMILY DOWN: NOT AT ALL
4. FEELING TIRED OR HAVING LITTLE ENERGY: SEVERAL DAYS
SUM OF ALL RESPONSES TO PHQ QUESTIONS 1-9: 13
3. TROUBLE FALLING OR STAYING ASLEEP OR SLEEPING TOO MUCH: NEARLY EVERY DAY
5. POOR APPETITE OR OVEREATING: MORE THAN HALF THE DAYS
9. THOUGHTS THAT YOU WOULD BE BETTER OFF DEAD, OR OF HURTING YOURSELF: NOT AT ALL

## 2023-06-30 NOTE — ASSESSMENT & PLAN NOTE
Stable based on symptoms and exam.  Continue established treatment plan and follow-up at least yearly

## 2023-06-30 NOTE — PROGRESS NOTES
Subjective   Patient ID: Rachael Ruiz is a 57 y.o. female who presents for Medicare Annual Wellness Visit Subsequent, Hospital Follow-up (Went to hospital for back pain and sweats. BP was elevated and addressed. Started on Hydralazine. ), Diabetes, Hypertension, and Hyperlipidemia.    Diabetes  She has type 2 diabetes mellitus. No MedicAlert identification noted. The initial diagnosis of diabetes was made 40 years ago. Hypoglycemia symptoms include mood changes, nervousness/anxiousness and sweats. Pertinent negatives for hypoglycemia include no confusion, dizziness, headaches, hunger, pallor, seizures, sleepiness, speech difficulty or tremors. Associated symptoms include foot paresthesias, polydipsia, visual change and weakness. Pertinent negatives for diabetes include no blurred vision, no chest pain, no fatigue, no polyphagia, no polyuria and no weight loss. Pertinent negatives for hypoglycemia complications include no blackouts, no hospitalization, no nocturnal hypoglycemia, no required assistance and no required glucagon injection. Symptoms are worsening. Diabetic complications include peripheral neuropathy, PVD and retinopathy. Pertinent negatives for diabetic complications include no CVA, heart disease, impotence or nephropathy. Risk factors for coronary artery disease include dyslipidemia, family history, hypertension, obesity, sedentary lifestyle, stress and tobacco exposure. Current diabetic treatment includes oral agent (dual therapy). She is compliant with treatment none of the time. Her weight is stable. She is following a generally unhealthy diet. When asked about meal planning, she reported none. She has not had a previous visit with a dietitian. She never participates in exercise. She monitors blood glucose at home 3-4 x per day. She monitors urine at home <1 x per month. Blood glucose monitoring compliance is adequate. Her home blood glucose trend is fluctuating dramatically. Her breakfast blood  glucose is taken between 7-8 am. Her breakfast blood glucose range is generally <70 mg/dl. Her lunch blood glucose is taken between 11-12 pm. Her lunch blood glucose range is generally 110-130 mg/dl. Her dinner blood glucose is taken between 4-5 pm. Her dinner blood glucose range is generally  mg/dl. Her overall blood glucose range is 70-90 mg/dl. She sees a podiatrist.Eye exam is current.          She is here today for routine follow-up, annual wellness visit, and also follow-up hospitalization    Past medical history is as follows:  Diabetes mellitus: Currently taking metformin 1000 mg once daily and Trulicity 1.5 mg weekly.  Her most recent A1c done 6/13/2023 was 7.2%.  A1c done prior to this in December 2022 had been 6.3%  No adverse effects with medication.  She checks her glucose out of the office and it has been ranging from 1 30-1 31.  She reports that she is up-to-date on diabetic eye exams  Hyperlipidemia: Currently takes atorvastatin 80 mg daily.  Her last lipid panel done 12/5/2022 showed total cholesterol 168, HDL 26.1, LDL 79, triglycerides 313  Hypertension: Currently takes losartan 100 mg daily.  She is also taking hydralazine which was started during a recent hospitalization.  She checks her blood pressure out of the office and it typically is in the 150s systolic  Vitamin B12 deficiency currently on daily p.o. vitamin B12 1000 mcg daily.  Her last vitamin B-12 level done 12/5/2022 was greater than 2000  She follows with psychiatry due to history of bipolar disorder  Follows with cardiology for LVH and history of severe coronary artery calcification seen on previous low-dose CT scan.  She last saw her cardiologist in November and a stress test had been recommended  COPD: Currently taking Spiriva.  She reports this is working well and denies any recent cough or wheezing  She has chronic low back pain with history of laminectomy in April 2019  Follows with neurology due to chronic neuropathy  involving bilateral lower extremities    She is a smoker.  Has smoked approximately 1 pack/day for 40 years  Colon cancer screening: Had a negative Cologuard 9/2021  Last mammogram was done 12/2021.  This was category 2  Lung cancer screening: No worrisome lung nodules seen on CT chest 6/13/2023  Vaccinations: Received tetanus vaccine 2021.  Received PPSV23 vaccine in 2021      She was recently hospitalized 6/12 through 6/14/2023  Prior to hospitalization she had presented to the ED with 2 days of sweating, as well as a fall 2 days prior.  Labs done in the ED showed elevated white blood cell count at 14.6.  She had a positive COVID swab.  CT head, lumbar spine and cervical spine were unremarkable  CT of the thoracic spine showed mild compression deformities of the T3 and T4 vertebral bodies.  They had planned on discharging her home with recommendations to follow-up with PCP and orthopedics for the compression deformity, however she had then became more diaphoretic, blood pressure had increased, and she developed worsening abdominal pain.  A CTA of the chest, abdomen and pelvis was then ordered.  This showed a subtle paravertebral abnormality T1-T3 potentially indicating hematoma in the setting of trauma, however esophageal injury was not excluded.  She was admitted for further evaluation  These findings were discussed with CT surgery and GI and an esophagram was recommended.  She had a barium swallow done on 6/13 which did not show any evidence of contrast extra vacation or esophageal perforation  She had an MRI of her thoracic spine done on 6/13 which showed prevertebral edema at C7-T4, nonspecific.  Major ligamentous structures intact.  Endplate edema in the lower cervical and upper thoracic spine is favored to be degenerative, however microtrabecular injury cannot be excluded  During her stay she was prophylactically treated with fluconazole, cefepime and Zosyn.  GI had recommended outpatient follow-up for  possible EGD several weeks after discharge  On discharge it was recommended that she follow-up with PCP, GI, as well as orthopedic surgery    She states that she is no longer having back pain since discharge from the hospital.  She is going to be contacting both orthopedic and GI for follow-up    Review of Systems   Constitutional:  Negative for fatigue and weight loss.   Eyes:  Negative for blurred vision.   Respiratory:  Negative for cough and wheezing.    Cardiovascular:  Negative for chest pain.   Endocrine: Positive for polydipsia. Negative for polyphagia and polyuria.   Genitourinary:  Negative for impotence.   Musculoskeletal:  Positive for back pain (Improved since hospitalization).   Skin:  Negative for pallor.   Neurological:  Positive for weakness and numbness. Negative for dizziness, tremors, seizures, speech difficulty and headaches.   Psychiatric/Behavioral:  Negative for confusion. The patient is nervous/anxious.        Objective   /78   Pulse 82   Resp 20   Ht 1.524 m (5')   Wt 72.6 kg (160 lb)   SpO2 96%   BMI 31.25 kg/m²     Physical Exam  Vitals reviewed.   Constitutional:       General: She is not in acute distress.     Appearance: Normal appearance. She is well-developed.   HENT:      Head: Normocephalic.      Right Ear: Tympanic membrane, ear canal and external ear normal.      Left Ear: Tympanic membrane, ear canal and external ear normal.   Eyes:      Conjunctiva/sclera: Conjunctivae normal.   Neck:      Thyroid: No thyromegaly.      Vascular: No JVD.   Cardiovascular:      Rate and Rhythm: Normal rate and regular rhythm.      Heart sounds: Normal heart sounds.   Pulmonary:      Effort: Pulmonary effort is normal.      Breath sounds: Normal breath sounds.   Abdominal:      Palpations: Abdomen is soft.      Tenderness: There is no abdominal tenderness.   Musculoskeletal:         General: Normal range of motion.      Right lower leg: No edema.      Left lower leg: No edema.       Comments: No thoracic paraspinal tenderness  Currently in wheelchair   Lymphadenopathy:      Cervical: No cervical adenopathy.   Skin:     Findings: No rash.   Neurological:      Mental Status: She is alert and oriented to person, place, and time.   Psychiatric:         Mood and Affect: Mood normal.         Behavior: Behavior normal.         Assessment/Plan   Problem List Items Addressed This Visit          Medium    B12 deficiency    Relevant Orders    Vitamin B12    Compression fracture of thoracic vertebra (CMS/HCC)    Relevant Orders    XR DEXA bone density    Vitamin D, Total    COPD (chronic obstructive pulmonary disease) (CMS/HCC)    Essential hypertension, benign    Relevant Medications    hydroCHLOROthiazide (HYDRODiuril) 12.5 mg tablet    Other Relevant Orders    Basic Metabolic Panel    CBC and Auto Differential    Lipid Panel    Hyperlipidemia    Relevant Orders    Lipid Panel    Type 2 diabetes mellitus (CMS/HCC)    Relevant Medications    metFORMIN (Glucophage) 1,000 mg tablet     Other Visit Diagnoses       Routine health maintenance    -  Primary    Breast cancer screening by mammogram        Relevant Orders    BI mammo bilateral screening tomosynthesis    Tobacco use        Body mass index (BMI) 32.0-32.9, adult        Relevant Orders    Vitamin D, Total    Routine general medical examination at health care facility            #1 health maintenance  Up-to-date on colon cancer screening  Due for screening mammogram.  This was ordered today  Up-to-date on lung cancer screening  Tobacco cessation recommended  Given Prevnar 20 vaccine today.  Up-to-date on tetanus and Shingrix vaccines  2.  Diabetes mellitus: Her most recent A1c done 2 weeks ago was slightly above goal at 7.2%.  We will increase metformin to 1000 mg twice daily and continue Trulicity.  #3 hyperlipidemia: Last LDL was close to goal.  Continue atorvastatin at current dose and will recheck lipid panel  4.  Hypertension: Her blood pressure  today is elevated at 155/78.  We will start HCTZ at 12.5 mg daily and continue losartan and hydralazine.  She was previously taking HCTZ in the past without any problems.  Follow-up in 1 month to recheck blood pressure  5.  Vitamin B12 deficiency: Continue p.o. B12.  No B12 deficiency on last labs.  Recheck vitamin B12 level  6.  COPD: Stable on Spiriva  7.  Recent hospitalization: We reviewed her most recent hospitalization.  During her stay there was some concern about a possible esophageal injury, however this was not seen on barium swallow.  She is going to be following up with GI to discuss further  She is also going to be following up with orthopedics for the T3 and T4 mild compression deformities seen on CT.  She denies any pain in this area currently  Given that she did have compression deformity seen on CT, we will obtain a DEXA scan to evaluate for possible osteoporosis and also check vitamin D level  Follow-up in 1 month for recheck

## 2023-06-30 NOTE — PROGRESS NOTES
Subjective   Patient ID: Rachael Ruiz is a 57 y.o. female who presents for Medicare Annual Wellness Visit Subsequent, Hospital Follow-up (Went to hospital for back pain and sweats. BP was elevated and addressed. Started on Hydralazine. ), Diabetes, Hypertension, and Hyperlipidemia.    Diabetes  She has type 2 diabetes mellitus. No MedicAlert identification noted. The initial diagnosis of diabetes was made 40 years ago. Hypoglycemia symptoms include mood changes, nervousness/anxiousness and sweats. Pertinent negatives for hypoglycemia include no confusion, dizziness, headaches, hunger, pallor, seizures, sleepiness, speech difficulty or tremors. Associated symptoms include foot paresthesias, polydipsia, visual change and weakness. Pertinent negatives for diabetes include no blurred vision, no chest pain, no fatigue, no polyphagia, no polyuria and no weight loss. Pertinent negatives for hypoglycemia complications include no blackouts, no hospitalization, no nocturnal hypoglycemia, no required assistance and no required glucagon injection. Symptoms are worsening. Diabetic complications include peripheral neuropathy, PVD and retinopathy. Pertinent negatives for diabetic complications include no CVA, heart disease, impotence or nephropathy. Risk factors for coronary artery disease include dyslipidemia, family history, hypertension, obesity, sedentary lifestyle, stress and tobacco exposure. Current diabetic treatment includes oral agent (dual therapy). She is compliant with treatment none of the time. Her weight is stable. She is following a generally unhealthy diet. When asked about meal planning, she reported none. She has not had a previous visit with a dietitian. She never participates in exercise. She monitors blood glucose at home 3-4 x per day. She monitors urine at home <1 x per month. Blood glucose monitoring compliance is adequate. Her home blood glucose trend is fluctuating dramatically. Her breakfast blood  glucose is taken between 7-8 am. Her breakfast blood glucose range is generally <70 mg/dl. Her lunch blood glucose is taken between 11-12 pm. Her lunch blood glucose range is generally 110-130 mg/dl. Her dinner blood glucose is taken between 4-5 pm. Her dinner blood glucose range is generally  mg/dl. Her overall blood glucose range is 70-90 mg/dl. She sees a podiatrist.Eye exam is current.        Review of Systems   Constitutional:  Negative for fatigue and weight loss.   Eyes:  Negative for blurred vision.   Cardiovascular:  Negative for chest pain.   Endocrine: Positive for polydipsia. Negative for polyphagia and polyuria.   Genitourinary:  Negative for impotence.   Skin:  Negative for pallor.   Neurological:  Positive for weakness. Negative for dizziness, tremors, seizures, speech difficulty and headaches.   Psychiatric/Behavioral:  Negative for confusion. The patient is nervous/anxious.        Objective   /78   Pulse 82   Resp 20   Ht 1.524 m (5')   Wt 72.6 kg (160 lb)   SpO2 96%   BMI 31.25 kg/m²     Physical Exam    Assessment/Plan   Problem List Items Addressed This Visit       Type 2 diabetes mellitus (CMS/HCC)    COPD (chronic obstructive pulmonary disease) (CMS/HCC)    Essential hypertension, benign    Relevant Orders    Basic Metabolic Panel    CBC and Auto Differential    Lipid Panel    Hyperlipidemia    Relevant Orders    Lipid Panel    B12 deficiency    Relevant Orders    Vitamin B12    Compression fracture of thoracic vertebra (CMS/HCC)    Relevant Orders    XR DEXA bone density    Vitamin D, Total     Other Visit Diagnoses       Routine health maintenance    -  Primary    Breast cancer screening by mammogram        Relevant Orders    BI mammo bilateral screening tomosynthesis    Tobacco use        Body mass index (BMI) 32.0-32.9, adult        Relevant Orders    Vitamin D, Total    Routine general medical examination at health care facility

## 2023-07-10 ENCOUNTER — TELEPHONE (OUTPATIENT)
Dept: PRIMARY CARE | Facility: CLINIC | Age: 58
End: 2023-07-10
Payer: MEDICARE

## 2023-07-10 NOTE — TELEPHONE ENCOUNTER
Patient called in this morning she is concerned that her pulse has been running high and she would like to know why.    Pulse has been ranging 102-109    B/p 118/78     Please advise further-Thanks

## 2023-07-17 DIAGNOSIS — E11.9 TYPE 2 DIABETES MELLITUS WITHOUT COMPLICATION, UNSPECIFIED WHETHER LONG TERM INSULIN USE (MULTI): ICD-10-CM

## 2023-07-17 RX ORDER — BLOOD SUGAR DIAGNOSTIC
STRIP MISCELLANEOUS
Qty: 100 STRIP | Refills: 3 | Status: SHIPPED | OUTPATIENT
Start: 2023-07-17 | End: 2023-11-13

## 2023-07-19 DIAGNOSIS — I10 ESSENTIAL HYPERTENSION, BENIGN: ICD-10-CM

## 2023-07-19 DIAGNOSIS — G89.29 CHRONIC LOW BACK PAIN WITH SCIATICA, SCIATICA LATERALITY UNSPECIFIED, UNSPECIFIED BACK PAIN LATERALITY: ICD-10-CM

## 2023-07-19 DIAGNOSIS — M54.40 CHRONIC LOW BACK PAIN WITH SCIATICA, SCIATICA LATERALITY UNSPECIFIED, UNSPECIFIED BACK PAIN LATERALITY: ICD-10-CM

## 2023-07-19 RX ORDER — LIDOCAINE 50 MG/G
PATCH TOPICAL
Qty: 90 PATCH | Refills: 3 | Status: SHIPPED | OUTPATIENT
Start: 2023-07-19 | End: 2024-02-13

## 2023-07-19 RX ORDER — HYDRALAZINE HYDROCHLORIDE 50 MG/1
50 TABLET, FILM COATED ORAL EVERY 12 HOURS
Qty: 180 TABLET | Refills: 3 | Status: SHIPPED | OUTPATIENT
Start: 2023-07-19 | End: 2023-10-12

## 2023-07-27 ENCOUNTER — PATIENT OUTREACH (OUTPATIENT)
Dept: PRIMARY CARE | Facility: CLINIC | Age: 58
End: 2023-07-27
Payer: MEDICARE

## 2023-07-27 NOTE — PROGRESS NOTES
Call placed regarding monthly post discharge follow up call. Pt states she has fallen 3X in the past week and injured her back. Pt states she has a H/O back surgery and was instructed to follow up with her orthopedic surgeon. Pt does have a follow up with Dr. Bauer on 7/31/2023. Pt states she would like to find a new orthopedic doctor and needs to find a new neurologist. Advised discussing this with Dr. Bauer at her follow up appt for potential referrals. Pt stated she needs to get her bone density and mammogram completed but does not know where to go. Advised calling [facility] to schedule tests because Dr. Bauer ordered them electronically and the hospital would have access to the information. Advised pt to call with questions or concerns following her appt with Dr. Bauer.

## 2023-07-31 ENCOUNTER — APPOINTMENT (OUTPATIENT)
Dept: PRIMARY CARE | Facility: CLINIC | Age: 58
End: 2023-07-31
Payer: MEDICARE

## 2023-08-10 ENCOUNTER — PATIENT OUTREACH (OUTPATIENT)
Dept: PRIMARY CARE | Facility: CLINIC | Age: 58
End: 2023-08-10
Payer: MEDICARE

## 2023-08-10 NOTE — PROGRESS NOTES
Unable to reach patient for monthly post discharge follow up. LVM reminding pt of upcoming appt with Dr. Bauer 8/25/2023 14:20 and call back number for patient to call if needed.

## 2023-08-21 DIAGNOSIS — J44.9 CHRONIC OBSTRUCTIVE PULMONARY DISEASE, UNSPECIFIED COPD TYPE (MULTI): Primary | ICD-10-CM

## 2023-08-21 RX ORDER — ALBUTEROL SULFATE 90 UG/1
1-2 AEROSOL, METERED RESPIRATORY (INHALATION) EVERY 4 HOURS PRN
Qty: 18 G | Refills: 1 | Status: SHIPPED | OUTPATIENT
Start: 2023-08-21 | End: 2023-10-16

## 2023-08-21 NOTE — TELEPHONE ENCOUNTER
Rx Refill Request Telephone Encounter    Name:  Rachael Ruiz  :  [id number]  Medication Name:  Albuterol inhaler

## 2023-08-22 ASSESSMENT — ENCOUNTER SYMPTOMS
BLURRED VISION: 0
ORTHOPNEA: 0
HEADACHES: 0
SWEATS: 1
HYPERTENSION: 1
NECK PAIN: 0
PND: 0
PALPITATIONS: 0
SHORTNESS OF BREATH: 1

## 2023-08-23 DIAGNOSIS — E78.5 HYPERLIPIDEMIA, UNSPECIFIED HYPERLIPIDEMIA TYPE: Primary | ICD-10-CM

## 2023-08-23 RX ORDER — ATORVASTATIN CALCIUM 80 MG/1
80 TABLET, FILM COATED ORAL DAILY
Qty: 90 TABLET | Refills: 3 | Status: SHIPPED | OUTPATIENT
Start: 2023-08-23 | End: 2024-05-06 | Stop reason: SDUPTHER

## 2023-08-23 NOTE — TELEPHONE ENCOUNTER
Rx Refill Request Telephone Encounter    Name:  Rachael Ruiz  :  [id number]  Medication Name: Atorvastatin 80 mg

## 2023-08-25 ENCOUNTER — APPOINTMENT (OUTPATIENT)
Dept: PRIMARY CARE | Facility: CLINIC | Age: 58
End: 2023-08-25
Payer: MEDICARE

## 2023-08-25 DIAGNOSIS — G62.9 PERIPHERAL POLYNEUROPATHY: Primary | ICD-10-CM

## 2023-08-25 ASSESSMENT — ENCOUNTER SYMPTOMS
BLURRED VISION: 0
SHORTNESS OF BREATH: 1
HYPERTENSION: 1
HEADACHES: 0
ORTHOPNEA: 0
NECK PAIN: 0
SWEATS: 1
PALPITATIONS: 0
PND: 0

## 2023-08-29 ENCOUNTER — TELEMEDICINE (OUTPATIENT)
Dept: PRIMARY CARE | Facility: CLINIC | Age: 58
End: 2023-08-29
Payer: MEDICARE

## 2023-08-29 VITALS — DIASTOLIC BLOOD PRESSURE: 80 MMHG | SYSTOLIC BLOOD PRESSURE: 133 MMHG

## 2023-08-29 DIAGNOSIS — I10 ESSENTIAL HYPERTENSION, BENIGN: Primary | ICD-10-CM

## 2023-08-29 DIAGNOSIS — G25.81 RESTLESS LEGS SYNDROME: ICD-10-CM

## 2023-08-29 DIAGNOSIS — G62.89 OTHER POLYNEUROPATHY: ICD-10-CM

## 2023-08-29 PROCEDURE — 99442 PR PHYS/QHP TELEPHONE EVALUATION 11-20 MIN: CPT | Performed by: FAMILY MEDICINE

## 2023-08-29 ASSESSMENT — ENCOUNTER SYMPTOMS
BLURRED VISION: 0
HYPERTENSION: 1
SHORTNESS OF BREATH: 0
HEADACHES: 0
PALPITATIONS: 0
NUMBNESS: 1

## 2023-08-29 NOTE — PROGRESS NOTES
Subjective   Patient ID: Rachael Ruiz is a 57 y.o. female who presents for Hypertension.    Hypertension  This is a chronic problem. The problem has been gradually improving since onset. Pertinent negatives include no blurred vision, chest pain, headaches, palpitations, peripheral edema or shortness of breath.        She is here today for hypertension follow-up  She is currently taking losartan 100 mg daily, hydralazine 50 mg twice daily and HCTlood pressure at that time was elevated at 155/78  She is compliant with taking medications.  She monitors her blood pressure out of the officZ 12.5 mg daily.  HCTZ was started at her last visit 2 months ago.  She checks blood pressure out of the office and it typically ranges from 132-136/70-83.  She has been following with neurology for diabetic neuropathy and also restless leg syndrome.  She has been taking gabapentin 800 mg 4 times daily for diabetic neuropathy and Requip 2 mg nightly for restless leg syndrome.  Her current neurologist is leaving and she is currently in the process of establishing with a new neurologist.  She would like to know if we can refill these medications until she establishes with a new neurologist  She feels that the Requip works well for her restless leg symptoms  Gabapentin helps with her diabetic neuropathy symptoms.  She has decreased sensation involving both lower extremities below the level of the knees.  She will get a burning pain in both feet.  This improves when she takes gabapentin and no adverse effects      Review of Systems   Eyes:  Negative for blurred vision.   Respiratory:  Negative for shortness of breath.    Cardiovascular:  Negative for chest pain and palpitations.   Neurological:  Positive for numbness. Negative for headaches.       Objective   /80 Comment: pt reported    Physical Exam today's visit was done over the telephone.  Exam was not performed because of this    Assessment/Plan   Problem List Items Addressed  This Visit          Medium    Essential hypertension, benign - Primary    Peripheral neuropathy    Restless legs syndrome   Her home blood pressure readings have improved and are at goal.  Continue HCTZ, losartan and hydralazine at current dose and will recheck at follow-up  Discussed that I can refill gabapentin and Requip if needed until she is able to establish with a new neurologist.  [facility] was reviewed.  She does not need a refill on these medications today.  She is going to be due for a diabetes follow-up next month.  We will recheck her blood pressure at that time  Total time spent today was approximately 15 minutes

## 2023-09-12 DIAGNOSIS — J44.9 CHRONIC OBSTRUCTIVE PULMONARY DISEASE, UNSPECIFIED COPD TYPE (MULTI): ICD-10-CM

## 2023-09-12 RX ORDER — TIOTROPIUM BROMIDE INHALATION SPRAY 3.12 UG/1
2 SPRAY, METERED RESPIRATORY (INHALATION) DAILY
Qty: 16 G | Refills: 6 | Status: SHIPPED | OUTPATIENT
Start: 2023-09-12

## 2023-09-13 DIAGNOSIS — I10 ESSENTIAL HYPERTENSION, BENIGN: ICD-10-CM

## 2023-09-13 DIAGNOSIS — E11.42 TYPE 2 DIABETES MELLITUS WITH DIABETIC POLYNEUROPATHY, WITHOUT LONG-TERM CURRENT USE OF INSULIN (MULTI): ICD-10-CM

## 2023-09-13 RX ORDER — DULAGLUTIDE 1.5 MG/.5ML
1.5 INJECTION, SOLUTION SUBCUTANEOUS
Qty: 2 ML | Refills: 3 | Status: SHIPPED | OUTPATIENT
Start: 2023-09-13 | End: 2023-10-12 | Stop reason: ALTCHOICE

## 2023-09-13 RX ORDER — LOSARTAN POTASSIUM 100 MG/1
100 TABLET ORAL DAILY
Qty: 90 TABLET | Refills: 3 | Status: SHIPPED | OUTPATIENT
Start: 2023-09-13 | End: 2024-05-06 | Stop reason: SDUPTHER

## 2023-09-15 DIAGNOSIS — E11.42 TYPE 2 DIABETES MELLITUS WITH DIABETIC POLYNEUROPATHY, WITHOUT LONG-TERM CURRENT USE OF INSULIN (MULTI): Primary | ICD-10-CM

## 2023-09-15 RX ORDER — GABAPENTIN 800 MG/1
800 TABLET ORAL
Qty: 120 TABLET | Refills: 1 | Status: SHIPPED | OUTPATIENT
Start: 2023-09-15 | End: 2023-11-13 | Stop reason: SDUPTHER

## 2023-09-15 NOTE — TELEPHONE ENCOUNTER
Patient called wants to let you know her Bone density is scheduled for 09/19/2023.    In addition she still has not seen a Neurologist to refill her Gabapentin.  I will provide her with the information to schedule with one.   Would you like to send a temporary script in for her before she runs out? Please advise.

## 2023-09-19 ENCOUNTER — PATIENT OUTREACH (OUTPATIENT)
Dept: PRIMARY CARE | Facility: CLINIC | Age: 58
End: 2023-09-19
Payer: MEDICARE

## 2023-09-19 NOTE — PROGRESS NOTES
Unable to reach pt for outreach call to wrap up TCM services. LVM with pt to return call for questions or concerns. The pt has met the 30 day and 90 day rehospitalization goals and is discharged from TCM services.

## 2023-09-25 ENCOUNTER — APPOINTMENT (OUTPATIENT)
Dept: PRIMARY CARE | Facility: CLINIC | Age: 58
End: 2023-09-25
Payer: MEDICARE

## 2023-09-27 ENCOUNTER — TELEPHONE (OUTPATIENT)
Dept: PRIMARY CARE | Facility: CLINIC | Age: 58
End: 2023-09-27
Payer: MEDICARE

## 2023-09-28 ENCOUNTER — TELEMEDICINE (OUTPATIENT)
Dept: PRIMARY CARE | Facility: CLINIC | Age: 58
End: 2023-09-28
Payer: MEDICARE

## 2023-09-28 DIAGNOSIS — J06.9 UPPER RESPIRATORY TRACT INFECTION, UNSPECIFIED TYPE: ICD-10-CM

## 2023-09-28 DIAGNOSIS — Z13.820 ENCOUNTER FOR OSTEOPOROSIS SCREENING IN ASYMPTOMATIC POSTMENOPAUSAL PATIENT: ICD-10-CM

## 2023-09-28 DIAGNOSIS — Z78.0 ENCOUNTER FOR OSTEOPOROSIS SCREENING IN ASYMPTOMATIC POSTMENOPAUSAL PATIENT: ICD-10-CM

## 2023-09-28 DIAGNOSIS — Z12.31 BREAST CANCER SCREENING BY MAMMOGRAM: ICD-10-CM

## 2023-09-28 DIAGNOSIS — R39.15 URINARY URGENCY: Primary | ICD-10-CM

## 2023-09-28 PROCEDURE — 99213 OFFICE O/P EST LOW 20 MIN: CPT | Performed by: FAMILY MEDICINE

## 2023-09-28 ASSESSMENT — ENCOUNTER SYMPTOMS
HEADACHES: 0
MYALGIAS: 0
RHINORRHEA: 0
SWEATS: 1
COUGH: 1
HEARTBURN: 0
ABDOMINAL PAIN: 0
SORE THROAT: 1
SHORTNESS OF BREATH: 1
HEMOPTYSIS: 0
SHORTNESS OF BREATH: 0
CHILLS: 0
FEVER: 0

## 2023-09-28 NOTE — PROGRESS NOTES
Subjective   Patient ID: Rachael Ruiz is a 58 y.o. female who presents for covid concerns and uti.    Cough  This is a new problem. The current episode started yesterday. The problem has been gradually improving. The problem occurs hourly. The cough is Non-productive and productive of sputum. Associated symptoms include nasal congestion, a sore throat and sweats. Pertinent negatives include no chest pain, chills, ear congestion, ear pain, fever, headaches, heartburn, hemoptysis, myalgias, postnasal drip, rhinorrhea or shortness of breath. Nothing aggravates the symptoms. Risk factors for lung disease include animal exposure and smoking/tobacco exposure.      We could not get video to work and today's visit was done over the telephone  She is concerned about possible COVID infection  Symptoms started 5 to 6 days ago.  At onset she had a sore throat.  She has had a mild dry cough.  Yesterday she developed loss of taste and smell.  She denies any shortness of breath, fever, headache, myalgias or nasal congestion or drainage.  No recent known contact with anyone with COVID infection.  She has received her initial COVID-vaccine series but not the most recent booster  She is also concerned about a possible UTI.  Over the last several days she has had urgency and hesitancy.  No dysuria.  No abdominal pain.  No fever.  She is a diabetic and her glucose readings have been averaging 132    Review of Systems   Constitutional:  Negative for chills and fever.   HENT:  Positive for sore throat. Negative for ear pain, postnasal drip and rhinorrhea.    Respiratory:  Positive for cough. Negative for hemoptysis and shortness of breath.    Cardiovascular:  Negative for chest pain.   Gastrointestinal:  Negative for abdominal pain and heartburn.   Musculoskeletal:  Negative for myalgias.   Neurological:  Negative for headaches.       Objective   There were no vitals taken for this visit.    Physical Exam not done due to today's  visit being done over the telephone    Assessment/Plan   Problem List Items Addressed This Visit    None  Visit Diagnoses       Urinary urgency    -  Primary    Relevant Orders    Urine Culture    Urinalysis with Reflex Microscopic    Upper respiratory tract infection, unspecified type        Relevant Orders    Sars-CoV-2 PCR, Symptomatic        #1 URI: She presents today with a 5 to 6-day history of symptoms including sore throat, cough, and now loss of taste or smell.  We discussed potential causes.  It is possible that this may be due to a viral infection, however given her recent loss of taste and smell we also cannot rule out COVID infection.  However, since her symptoms have been present for 5 to 6 days, she is outside of the window for antiviral treatment.  We discussed monitoring her symptoms versus obtaining testing, and she is requesting to be tested for COVID.  She is not able to get this done today, but is planning on getting this done tomorrow.  She is aware that I will not have these results until Monday of next week.  Continue symptomatic care and I advised ER if any worsening symptoms, shortness of breath, fever, chest pain or leg edema.  We will follow-up by phone on Monday next week once I have her results  2.  Urinary symptoms: We will obtain urinalysis and urine culture and discussed treatment if positive.  Go to ER if any worsening symptoms, abdominal pain or fever.  Follow-up by phone on Monday once I have her urine culture results

## 2023-09-30 ENCOUNTER — HOSPITAL ENCOUNTER (OUTPATIENT)
Facility: HOSPITAL | Age: 58
Setting detail: OBSERVATION
Discharge: HOME | End: 2023-10-03
Attending: STUDENT IN AN ORGANIZED HEALTH CARE EDUCATION/TRAINING PROGRAM | Admitting: INTERNAL MEDICINE
Payer: MEDICARE

## 2023-09-30 ENCOUNTER — APPOINTMENT (OUTPATIENT)
Dept: RADIOLOGY | Facility: HOSPITAL | Age: 58
End: 2023-09-30
Payer: MEDICARE

## 2023-09-30 DIAGNOSIS — I10 ESSENTIAL HYPERTENSION, BENIGN: ICD-10-CM

## 2023-09-30 DIAGNOSIS — R07.9 CHEST PAIN, UNSPECIFIED: ICD-10-CM

## 2023-09-30 DIAGNOSIS — N39.0 UTI (URINARY TRACT INFECTION), UNCOMPLICATED: ICD-10-CM

## 2023-09-30 DIAGNOSIS — I51.7 LVH (LEFT VENTRICULAR HYPERTROPHY): ICD-10-CM

## 2023-09-30 DIAGNOSIS — N30.00 ACUTE CYSTITIS WITHOUT HEMATURIA: ICD-10-CM

## 2023-09-30 DIAGNOSIS — J41.0 SIMPLE CHRONIC BRONCHITIS (MULTI): ICD-10-CM

## 2023-09-30 DIAGNOSIS — R07.89 ATYPICAL CHEST PAIN: ICD-10-CM

## 2023-09-30 DIAGNOSIS — R06.02 SHORTNESS OF BREATH: ICD-10-CM

## 2023-09-30 DIAGNOSIS — Z72.0 TOBACCO ABUSE: ICD-10-CM

## 2023-09-30 DIAGNOSIS — I25.10 CORONARY ARTERY CALCIFICATION SEEN ON CT SCAN: Primary | ICD-10-CM

## 2023-09-30 DIAGNOSIS — G25.81 RESTLESS LEGS SYNDROME: ICD-10-CM

## 2023-09-30 LAB
ALBUMIN SERPL BCP-MCNC: 3.9 G/DL (ref 3.4–5)
ALP SERPL-CCNC: 95 U/L (ref 33–110)
ALT SERPL W P-5'-P-CCNC: 17 U/L (ref 7–45)
ANION GAP SERPL CALC-SCNC: 16 MMOL/L (ref 10–20)
APPEARANCE UR: ABNORMAL
AST SERPL W P-5'-P-CCNC: 17 U/L (ref 9–39)
B-OH-BUTYR SERPL-SCNC: 0.19 MMOL/L (ref 0.02–0.27)
BASOPHILS # BLD AUTO: 0.04 X10*3/UL (ref 0–0.1)
BASOPHILS NFR BLD AUTO: 0.4 %
BILIRUB SERPL-MCNC: 0.4 MG/DL (ref 0–1.2)
BILIRUB UR STRIP.AUTO-MCNC: NEGATIVE MG/DL
BNP SERPL-MCNC: 163 PG/ML (ref 0–99)
BUN SERPL-MCNC: 13 MG/DL (ref 6–23)
CALCIUM SERPL-MCNC: 8.9 MG/DL (ref 8.6–10.3)
CARDIAC TROPONIN I PNL SERPL HS: 5 NG/L (ref 0–13)
CARDIAC TROPONIN I PNL SERPL HS: 6 NG/L (ref 0–13)
CHLORIDE SERPL-SCNC: 104 MMOL/L (ref 98–107)
CO2 SERPL-SCNC: 26 MMOL/L (ref 21–32)
COLOR UR: YELLOW
CREAT SERPL-MCNC: 0.73 MG/DL (ref 0.5–1.05)
EOSINOPHIL # BLD AUTO: 0.1 X10*3/UL (ref 0–0.7)
EOSINOPHIL NFR BLD AUTO: 0.9 %
ERYTHROCYTE [DISTWIDTH] IN BLOOD BY AUTOMATED COUNT: 13.5 % (ref 11.5–14.5)
GFR SERPL CREATININE-BSD FRML MDRD: >90 ML/MIN/1.73M*2
GLUCOSE SERPL-MCNC: 109 MG/DL (ref 74–99)
GLUCOSE UR STRIP.AUTO-MCNC: NEGATIVE MG/DL
HCT VFR BLD AUTO: 33.4 % (ref 36–46)
HGB BLD-MCNC: 11.6 G/DL (ref 12–16)
IMM GRANULOCYTES # BLD AUTO: 0.07 X10*3/UL (ref 0–0.7)
IMM GRANULOCYTES NFR BLD AUTO: 0.7 % (ref 0–0.9)
INR PPP: 1.2 (ref 0.9–1.1)
KETONES UR STRIP.AUTO-MCNC: NEGATIVE MG/DL
LACTATE SERPL-SCNC: 0.9 MMOL/L (ref 0.4–2)
LEUKOCYTE ESTERASE UR QL STRIP.AUTO: ABNORMAL
LYMPHOCYTES # BLD AUTO: 2.09 X10*3/UL (ref 1.2–4.8)
LYMPHOCYTES NFR BLD AUTO: 19.4 %
MAGNESIUM SERPL-MCNC: 1.64 MG/DL (ref 1.6–2.4)
MCH RBC QN AUTO: 32.9 PG (ref 26–34)
MCHC RBC AUTO-ENTMCNC: 34.7 G/DL (ref 32–36)
MCV RBC AUTO: 95 FL (ref 80–100)
MONOCYTES # BLD AUTO: 1.12 X10*3/UL (ref 0.1–1)
MONOCYTES NFR BLD AUTO: 10.4 %
NEUTROPHILS # BLD AUTO: 7.33 X10*3/UL (ref 1.2–7.7)
NEUTROPHILS NFR BLD AUTO: 68.2 %
NITRITE UR QL STRIP.AUTO: NEGATIVE
NRBC BLD-RTO: 0 /100 WBCS (ref 0–0)
PH UR STRIP.AUTO: 7 [PH]
PLATELET # BLD AUTO: 314 X10*3/UL (ref 150–450)
PMV BLD AUTO: 9.2 FL (ref 7.5–11.5)
POTASSIUM SERPL-SCNC: 3.5 MMOL/L (ref 3.5–5.3)
PROT SERPL-MCNC: 7.5 G/DL (ref 6.4–8.2)
PROT UR STRIP.AUTO-MCNC: ABNORMAL MG/DL
PROTHROMBIN TIME: 13 SECONDS (ref 9.8–12.8)
RBC # BLD AUTO: 3.53 X10*6/UL (ref 4–5.2)
RBC # UR STRIP.AUTO: NEGATIVE /UL
RBC #/AREA URNS AUTO: >20 /HPF
SARS-COV-2 RNA RESP QL NAA+PROBE: NOT DETECTED
SODIUM SERPL-SCNC: 142 MMOL/L (ref 136–145)
SP GR UR STRIP.AUTO: 1.02
SQUAMOUS #/AREA URNS AUTO: ABNORMAL /HPF
TSH SERPL-ACNC: 0.72 MIU/L (ref 0.44–3.98)
UROBILINOGEN UR STRIP.AUTO-MCNC: <2 MG/DL
WBC # BLD AUTO: 10.8 X10*3/UL (ref 4.4–11.3)
WBC #/AREA URNS AUTO: >50 /HPF

## 2023-09-30 PROCEDURE — 99285 EMERGENCY DEPT VISIT HI MDM: CPT | Mod: CS | Performed by: STUDENT IN AN ORGANIZED HEALTH CARE EDUCATION/TRAINING PROGRAM

## 2023-09-30 PROCEDURE — 83605 ASSAY OF LACTIC ACID: CPT | Performed by: NURSE PRACTITIONER

## 2023-09-30 PROCEDURE — 81001 URINALYSIS AUTO W/SCOPE: CPT | Performed by: NURSE PRACTITIONER

## 2023-09-30 PROCEDURE — G0378 HOSPITAL OBSERVATION PER HR: HCPCS

## 2023-09-30 PROCEDURE — 83735 ASSAY OF MAGNESIUM: CPT | Performed by: NURSE PRACTITIONER

## 2023-09-30 PROCEDURE — 85610 PROTHROMBIN TIME: CPT | Performed by: NURSE PRACTITIONER

## 2023-09-30 PROCEDURE — 84443 ASSAY THYROID STIM HORMONE: CPT | Performed by: STUDENT IN AN ORGANIZED HEALTH CARE EDUCATION/TRAINING PROGRAM

## 2023-09-30 PROCEDURE — 71045 X-RAY EXAM CHEST 1 VIEW: CPT | Mod: FY

## 2023-09-30 PROCEDURE — 84484 ASSAY OF TROPONIN QUANT: CPT | Performed by: NURSE PRACTITIONER

## 2023-09-30 PROCEDURE — 83880 ASSAY OF NATRIURETIC PEPTIDE: CPT | Performed by: NURSE PRACTITIONER

## 2023-09-30 PROCEDURE — 71045 X-RAY EXAM CHEST 1 VIEW: CPT | Performed by: RADIOLOGY

## 2023-09-30 PROCEDURE — 85025 COMPLETE CBC W/AUTO DIFF WBC: CPT | Performed by: NURSE PRACTITIONER

## 2023-09-30 PROCEDURE — 36415 COLL VENOUS BLD VENIPUNCTURE: CPT | Performed by: NURSE PRACTITIONER

## 2023-09-30 PROCEDURE — 82010 KETONE BODYS QUAN: CPT | Performed by: NURSE PRACTITIONER

## 2023-09-30 PROCEDURE — 87635 SARS-COV-2 COVID-19 AMP PRB: CPT | Performed by: NURSE PRACTITIONER

## 2023-09-30 PROCEDURE — 84484 ASSAY OF TROPONIN QUANT: CPT | Mod: 59 | Performed by: NURSE PRACTITIONER

## 2023-09-30 PROCEDURE — 99223 1ST HOSP IP/OBS HIGH 75: CPT | Performed by: INTERNAL MEDICINE

## 2023-09-30 PROCEDURE — 2500000004 HC RX 250 GENERAL PHARMACY W/ HCPCS (ALT 636 FOR OP/ED): Performed by: NURSE PRACTITIONER

## 2023-09-30 PROCEDURE — 80053 COMPREHEN METABOLIC PANEL: CPT | Performed by: NURSE PRACTITIONER

## 2023-09-30 RX ORDER — CEFTRIAXONE 1 G/50ML
1 INJECTION, SOLUTION INTRAVENOUS ONCE
Status: COMPLETED | OUTPATIENT
Start: 2023-09-30 | End: 2023-09-30

## 2023-09-30 RX ORDER — ACETAMINOPHEN 325 MG/1
650 TABLET ORAL EVERY 4 HOURS PRN
Status: DISCONTINUED | OUTPATIENT
Start: 2023-09-30 | End: 2023-10-03 | Stop reason: HOSPADM

## 2023-09-30 RX ORDER — SODIUM CHLORIDE, SODIUM LACTATE, POTASSIUM CHLORIDE, CALCIUM CHLORIDE 600; 310; 30; 20 MG/100ML; MG/100ML; MG/100ML; MG/100ML
100 INJECTION, SOLUTION INTRAVENOUS CONTINUOUS
Status: CANCELLED | OUTPATIENT
Start: 2023-09-30

## 2023-09-30 RX ORDER — POLYETHYLENE GLYCOL 3350 17 G/17G
17 POWDER, FOR SOLUTION ORAL DAILY
Status: CANCELLED | OUTPATIENT
Start: 2023-10-01

## 2023-09-30 RX ORDER — IBUPROFEN 200 MG
1 TABLET ORAL DAILY
Status: DISCONTINUED | OUTPATIENT
Start: 2023-09-30 | End: 2023-10-03 | Stop reason: HOSPADM

## 2023-09-30 RX ORDER — PANTOPRAZOLE SODIUM 40 MG/1
40 TABLET, DELAYED RELEASE ORAL
Status: DISCONTINUED | OUTPATIENT
Start: 2023-10-01 | End: 2023-10-03 | Stop reason: HOSPADM

## 2023-09-30 RX ORDER — HYDRALAZINE HYDROCHLORIDE 20 MG/ML
10 INJECTION INTRAMUSCULAR; INTRAVENOUS EVERY 4 HOURS PRN
Status: DISCONTINUED | OUTPATIENT
Start: 2023-09-30 | End: 2023-10-03 | Stop reason: HOSPADM

## 2023-09-30 RX ORDER — LABETALOL HYDROCHLORIDE 5 MG/ML
20 INJECTION, SOLUTION INTRAVENOUS ONCE
Status: COMPLETED | OUTPATIENT
Start: 2023-09-30 | End: 2023-09-30

## 2023-09-30 RX ORDER — ACETAMINOPHEN 325 MG/1
975 TABLET ORAL ONCE
Status: COMPLETED | OUTPATIENT
Start: 2023-09-30 | End: 2023-10-01

## 2023-09-30 RX ORDER — CEFTRIAXONE 1 G/50ML
1 INJECTION, SOLUTION INTRAVENOUS EVERY 24 HOURS
Status: DISCONTINUED | OUTPATIENT
Start: 2023-10-01 | End: 2023-10-03 | Stop reason: HOSPADM

## 2023-09-30 RX ADMIN — LABETALOL HYDROCHLORIDE 20 MG: 5 INJECTION, SOLUTION INTRAVENOUS at 20:45

## 2023-09-30 RX ADMIN — CEFTRIAXONE 1 G: 1 INJECTION, SOLUTION INTRAVENOUS at 20:45

## 2023-09-30 ASSESSMENT — COLUMBIA-SUICIDE SEVERITY RATING SCALE - C-SSRS
6. HAVE YOU EVER DONE ANYTHING, STARTED TO DO ANYTHING, OR PREPARED TO DO ANYTHING TO END YOUR LIFE?: NO
1. IN THE PAST MONTH, HAVE YOU WISHED YOU WERE DEAD OR WISHED YOU COULD GO TO SLEEP AND NOT WAKE UP?: NO
2. HAVE YOU ACTUALLY HAD ANY THOUGHTS OF KILLING YOURSELF?: NO

## 2023-09-30 NOTE — ED PROVIDER NOTES
HPI   Chief Complaint   Patient presents with    Shortness of Breath     Pt BIBA from home - c/o diaphoresis X3 days, cough, SOB.  Pt 96%spO2 on room air, pt denies pain       HPI  58-year-old female presents emergency department, patient states she has not been feeling very well, states increased diaphoresis for the last few days, states she is also feeling short of breath.  Patient states last time this happened she was dehydrated to have a urinary tract infection.  States she could have a urinary tract infection now but is not having any significant symptoms.  Patient states she has been drinking plenty of water and having good urine output.  She does mention that her cardiologist ordered a cardiac stress test for her sometime ago, states she is very afraid to have this test performed so she has been delaying it.  Patient states medical history of COPD, hypertension and diabetes.                    Diann Coma Scale Score: 15                  Patient History   Past Medical History:   Diagnosis Date    Allergic 2017    Anxiety 1985    Arthritis 2017    Cataract 2018    COPD (chronic obstructive pulmonary disease) (CMS/[facility]) 2017    Depression 1985    Diabetes mellitus (CMS/[facility]) 1986    HL (hearing loss) 1998    Hypertension 1986    Personal history of nicotine dependence 08/12/2021    Personal history of nicotine dependence    Substance abuse (CMS/[facility]) 2015    Visual impairment 1970     Past Surgical History:   Procedure Laterality Date    BACK SURGERY  2017    EYE SURGERY  2019    HYSTERECTOMY  1990    OTHER SURGICAL HISTORY  07/10/2019    Tonsillectomy    OTHER SURGICAL HISTORY  07/10/2019    Back surgery    OTHER SURGICAL HISTORY  07/10/2019    Hysterectomy    OTHER SURGICAL HISTORY  07/10/2019    Carpal tunnel surgery    OTHER SURGICAL HISTORY  01/11/2022    Throat surgery    OTHER SURGICAL HISTORY  01/11/2022    Knee surgery    OTHER SURGICAL HISTORY  01/11/2022    Breast surgery    OTHER SURGICAL HISTORY   01/11/2022    Cataract surgery    OTHER SURGICAL HISTORY  01/11/2022    Toe fracture repair    XR CHEST PACEMAKER WITH FLUORO  04/15/2019    XR CHEST PACEMAKER WITH FLUORO 4/15/2019 [facility]     Family History   Adopted: Yes   Problem Relation Name Age of Onset    Skin cancer Mother      Other (cardiac disorder) Father Julio C Storey (biological parent)     Alcohol abuse Father Julio C Storey (biological parent)     Diabetes Father Julio C Storey (biological parent)     Depression Father's Sister Valerie Alejandre     Diabetes Father's Sister Valerie Alejandre - Gin      Social History     Tobacco Use    Smoking status: Every Day     Packs/day: .5     Types: Cigarettes     Start date: 8/14/1974    Smokeless tobacco: Never    Tobacco comments:     I was introduced to cigarettes in 5th grade and got hooked really, really quick. 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    Drug use: Yes     Types: Marijuana     Comment: I was addicted to more than one substance back then. Clean 7       Physical Exam   ED Triage Vitals   Temp Pulse Resp BP   -- -- -- --      SpO2 Temp src Heart Rate Source Patient Position   -- -- -- --      BP Location FiO2 (%)     -- --       Physical Exam      Physical Exam:  Constitutional: Vitals noted, no distress. Afebrile.   Cardiovascular: Regular, rate, rhythm, no murmur.   Pulmonary: Lungs clear bilaterally with good aeration. No adventitious breath sounds.   Gastrointestinal: Soft, nonsurgical. Nontender. No peritoneal signs. Normoactive bowel sounds.   Musculoskeletal: No peripheral edema. Negative Homans bilaterally, no cords.   Skin: No rash.   Neuro: No focal neurologic deficits, NIH score of 0.      ED Course & MDM   ED Course as of 09/30/23 2051   Sat Sep 30, 2023   1642 Troponin I, High Sensitivity: 5 [LV]   1642 Troponin I Series, High  Sensitivity (0, 1 HR) [LV]      ED Course User Index  [LV] Marlycyndy Child, APRN-CNP         Diagnoses as of 09/30/23 2051   Atypical chest pain   UTI (urinary tract infection), uncomplicated       Medical Decision Making    Labs Reviewed   CBC WITH AUTO DIFFERENTIAL - Abnormal       Result Value    WBC 10.8      nRBC 0.0      RBC 3.53 (*)     Hemoglobin 11.6 (*)     Hematocrit 33.4 (*)     MCV 95      MCH 32.9      MCHC 34.7      RDW 13.5      Platelets 314      MPV 9.2      Neutrophils % 68.2      Immature Granulocytes %, Automated 0.7      Lymphocytes % 19.4      Monocytes % 10.4      Eosinophils % 0.9      Basophils % 0.4      Neutrophils Absolute 7.33      Immature Granulocytes Absolute, Automated 0.07      Lymphocytes Absolute 2.09      Monocytes Absolute 1.12 (*)     Eosinophils Absolute 0.10      Basophils Absolute 0.04     COMPREHENSIVE METABOLIC PANEL - Abnormal    Glucose 109 (*)     Sodium 142      Potassium 3.5      Chloride 104      Bicarbonate 26      Anion Gap 16      Urea Nitrogen 13      Creatinine 0.73      eGFR >90      Calcium 8.9      Albumin 3.9      Alkaline Phosphatase 95      Total Protein 7.5      AST 17      Bilirubin, Total 0.4      ALT 17     PROTIME-INR - Abnormal    Protime 13.0 (*)     INR 1.2 (*)    B-TYPE NATRIURETIC PEPTIDE - Abnormal     (*)     Narrative:        <100 pg/mL - Heart failure unlikely  100-299 pg/mL - Intermediate probability of acute heart                  failure exacerbation. Correlate with clinical                  context and patient history.    >=300 pg/mL - Heart Failure likely. Correlate with clinical                  context and patient history.    BNP testing is performed using different testing methodology at [facility] than at other [facility] hospitals. Direct result comparisons should only be made within the same method.      URINALYSIS WITH REFLEX MICROSCOPIC - Abnormal    Color, Urine Yellow      Appearance, Urine Hazy (*)      Specific Gravity, Urine 1.016      pH, Urine 7.0      Protein, Urine 100 (2+) (*)     Glucose, Urine NEGATIVE      Blood, Urine NEGATIVE      Ketones, Urine NEGATIVE      Bilirubin, Urine NEGATIVE      Urobilinogen, Urine <2.0      Nitrite, Urine NEGATIVE      Leukocyte Esterase, Urine LARGE (3+) (*)    URINALYSIS MICROSCOPIC ONLY - Abnormal    WBC, Urine >50 (*)     RBC, Urine >20 (*)     Squamous Epithelial Cells, Urine 26-50 (1+)     MAGNESIUM - Normal    Magnesium 1.64     SARS-COV-2 PCR, SYMPTOMATIC - Normal    Coronavirus 2019, PCR Not Detected      Narrative:     This assay has received FDA Emergency Use Authorization (EUA) and is only authorized for the duration of time that circumstances exist to justify the authorization of the emergency use of in vitro diagnostic tests for the detection of SARS-CoV-2 virus and/or diagnosis of COVID-19 infection under section 564(b)(1) of the Act, 21 U.S.C. 360bbb-3(b)(1). This assay is an in vitro diagnostic nucleic acid amplification test for the qualitative detection of SARS-CoV-2 from nasopharyngeal specimens and has been validated for use at [facility]. Negative results do not preclude COVID-19 infections and should not be used as the sole basis for diagnosis, treatment, or other management decisions.     LACTATE - Normal    Lactate 0.9      Narrative:     Venipuncture immediately after or during the administration of Metamizole may lead to falsely low results. Testing should be performed immediately  prior to Metamizole dosing.   SERIAL TROPONIN-INITIAL - Normal    Troponin I, High Sensitivity 5      Narrative:     Less than 99th percentile of normal range cutoff-  Female and children under 18 years old <14 ng/L; Male <21 ng/L: Negative  Repeat testing should be performed if clinically indicated.     Female and children under 18 years old 14-50 ng/L; Male 21-50 ng/L:  Consistent with possible cardiac damage and possible increased clinical    risk. Serial measurements may help to assess extent of myocardial damage.     >50 ng/L: Consistent with cardiac damage, increased clinical risk and  myocardial infarction. Serial measurements may help assess extent of   myocardial damage.      NOTE: Children less than 1 year old may have higher baseline troponin   levels and results should be interpreted in conjunction with the overall   clinical context.     NOTE: Troponin I testing is performed using a different   testing methodology at [facility] than at other   [facility]. Direct result comparisons should only   be made within the same method.   BETA HYDROXYBUTYRATE - Normal    Beta-Hydroxybutyrate 0.19      Narrative:     The beta-hydroxybutyrate test performance characteristics have been validated by  [facility]. This test has not been approved by the FDA; however,such approval is not necessary.     TROPONIN SERIES- (INITIAL, 1 HR)    Narrative:     The following orders were created for panel order Troponin I Series, High Sensitivity (0, 1 HR).  Procedure                               Abnormality         Status                     ---------                               -----------         ------                     Troponin I, High Sensiti...[[id number]]  Normal              Final result               Troponin, High Sensitivi...[[id number]]                                                   Please view results for these tests on the individual orders.   SERIAL TROPONIN, 1 HOUR   TROPONIN I, HIGH SENSITIVITY   TSH WITH REFLEX TO FREE T4 IF ABNORMAL   TSH WITH REFLEX TO FREE T4 IF ABNORMAL        XR chest 1 view   Final Result   1.  No evidence of acute cardiopulmonary process.                  MACRO:   None        Signed by: Toni Sotelo 9/30/2023 5:08 PM   Dictation workstation:   [id number]          Initial EKG was obtained at 1553 with ventricular of 73, 2 by me, showed normal sinus rhythm  with QT/QTc 458/504, nonspecific ST abnormality, no evidence of acute ischemia other acute findings.    Laboratory work-up obtained, CBC unremarkable, metabolic panel unremarkable as well.  Initial troponin 5, .  Patient had normal lactate level, urinalysis did show large leukocyte esterase with greater than 50 WBCs.    Patient was continued to be monitored in the department, continued to have elevated blood pressures, initially downtrending and then uptrending.  States diaphoresis with increasing blood pressure.  Describes mild shortness of breath, overall feeling unwell.    States some dyspnea on exertion as well.  Patient is very concerned regarding her heart and the cardiac stress test she was supposed to have.  Patient is not comfortable with discharge home.    Medicated for her blood pressure with labetalol, acetaminophen as she does have a mild fever, started on Rocephin for UTI.    Repeat EKG obtained at 2012 with ventricular of 63, sure by me shows sinus rhythm with sinus arrhythmia, prolonged QT, unremarkable ST-T wave pattern, no evidence of acute ischemia or other acute finding.    Will add on TSH, repeat troponin.  Discussed with hospitalist for admission.    Procedure  Procedures     Marly Child, GUEVARA-NINA  10/02/23 0601

## 2023-10-01 ENCOUNTER — APPOINTMENT (OUTPATIENT)
Dept: CARDIOLOGY | Facility: HOSPITAL | Age: 58
End: 2023-10-01
Payer: MEDICARE

## 2023-10-01 LAB
ALBUMIN SERPL BCP-MCNC: 3.6 G/DL (ref 3.4–5)
ALP SERPL-CCNC: 79 U/L (ref 33–110)
ALT SERPL W P-5'-P-CCNC: 16 U/L (ref 7–45)
ANION GAP SERPL CALC-SCNC: 11 MMOL/L (ref 10–20)
ANION GAP SERPL CALC-SCNC: 11 MMOL/L (ref 10–20)
AST SERPL W P-5'-P-CCNC: 15 U/L (ref 9–39)
BILIRUB SERPL-MCNC: 0.3 MG/DL (ref 0–1.2)
BUN SERPL-MCNC: 14 MG/DL (ref 6–23)
BUN SERPL-MCNC: 14 MG/DL (ref 6–23)
CALCIUM SERPL-MCNC: 8.6 MG/DL (ref 8.6–10.3)
CALCIUM SERPL-MCNC: 8.7 MG/DL (ref 8.6–10.3)
CARDIAC TROPONIN I PNL SERPL HS: 6 NG/L (ref 0–13)
CHLORIDE SERPL-SCNC: 103 MMOL/L (ref 98–107)
CHLORIDE SERPL-SCNC: 104 MMOL/L (ref 98–107)
CHOLEST SERPL-MCNC: 101 MG/DL (ref 0–199)
CHOLESTEROL/HDL RATIO: 4.5
CO2 SERPL-SCNC: 29 MMOL/L (ref 21–32)
CO2 SERPL-SCNC: 29 MMOL/L (ref 21–32)
CREAT SERPL-MCNC: 0.83 MG/DL (ref 0.5–1.05)
CREAT SERPL-MCNC: 0.84 MG/DL (ref 0.5–1.05)
ERYTHROCYTE [DISTWIDTH] IN BLOOD BY AUTOMATED COUNT: 13.4 % (ref 11.5–14.5)
ERYTHROCYTE [DISTWIDTH] IN BLOOD BY AUTOMATED COUNT: 13.6 % (ref 11.5–14.5)
GFR SERPL CREATININE-BSD FRML MDRD: 80 ML/MIN/1.73M*2
GFR SERPL CREATININE-BSD FRML MDRD: 81 ML/MIN/1.73M*2
GLUCOSE SERPL-MCNC: 132 MG/DL (ref 74–99)
GLUCOSE SERPL-MCNC: 184 MG/DL (ref 74–99)
HCT VFR BLD AUTO: 32.2 % (ref 36–46)
HCT VFR BLD AUTO: 32.6 % (ref 36–46)
HDLC SERPL-MCNC: 22.2 MG/DL
HGB BLD-MCNC: 10.6 G/DL (ref 12–16)
HGB BLD-MCNC: 10.8 G/DL (ref 12–16)
LDLC SERPL CALC-MCNC: 43 MG/DL (ref 140–190)
MAGNESIUM SERPL-MCNC: 1.5 MG/DL (ref 1.6–2.4)
MCH RBC QN AUTO: 31.5 PG (ref 26–34)
MCH RBC QN AUTO: 32 PG (ref 26–34)
MCHC RBC AUTO-ENTMCNC: 32.9 G/DL (ref 32–36)
MCHC RBC AUTO-ENTMCNC: 33.1 G/DL (ref 32–36)
MCV RBC AUTO: 96 FL (ref 80–100)
MCV RBC AUTO: 97 FL (ref 80–100)
NON HDL CHOLESTEROL: 79 MG/DL (ref 0–149)
NRBC BLD-RTO: 0 /100 WBCS (ref 0–0)
NRBC BLD-RTO: 0 /100 WBCS (ref 0–0)
PLATELET # BLD AUTO: 275 X10*3/UL (ref 150–450)
PLATELET # BLD AUTO: 287 X10*3/UL (ref 150–450)
PMV BLD AUTO: 9.1 FL (ref 7.5–11.5)
PMV BLD AUTO: 9.3 FL (ref 7.5–11.5)
POTASSIUM SERPL-SCNC: 3.2 MMOL/L (ref 3.5–5.3)
POTASSIUM SERPL-SCNC: 3.2 MMOL/L (ref 3.5–5.3)
PROT SERPL-MCNC: 6.7 G/DL (ref 6.4–8.2)
RBC # BLD AUTO: 3.36 X10*6/UL (ref 4–5.2)
RBC # BLD AUTO: 3.37 X10*6/UL (ref 4–5.2)
SODIUM SERPL-SCNC: 140 MMOL/L (ref 136–145)
SODIUM SERPL-SCNC: 141 MMOL/L (ref 136–145)
TRIGL SERPL-MCNC: 177 MG/DL (ref 0–149)
TSH SERPL-ACNC: 0.67 MIU/L (ref 0.44–3.98)
VLDL: 35 MG/DL (ref 0–40)
WBC # BLD AUTO: 10.6 X10*3/UL (ref 4.4–11.3)
WBC # BLD AUTO: 9.9 X10*3/UL (ref 4.4–11.3)

## 2023-10-01 PROCEDURE — S4991 NICOTINE PATCH NONLEGEND: HCPCS | Performed by: INTERNAL MEDICINE

## 2023-10-01 PROCEDURE — 2500000001 HC RX 250 WO HCPCS SELF ADMINISTERED DRUGS (ALT 637 FOR MEDICARE OP)

## 2023-10-01 PROCEDURE — 84484 ASSAY OF TROPONIN QUANT: CPT | Performed by: NURSE PRACTITIONER

## 2023-10-01 PROCEDURE — 2500000004 HC RX 250 GENERAL PHARMACY W/ HCPCS (ALT 636 FOR OP/ED): Performed by: NURSE PRACTITIONER

## 2023-10-01 PROCEDURE — 84443 ASSAY THYROID STIM HORMONE: CPT | Performed by: INTERNAL MEDICINE

## 2023-10-01 PROCEDURE — 2500000001 HC RX 250 WO HCPCS SELF ADMINISTERED DRUGS (ALT 637 FOR MEDICARE OP): Performed by: INTERNAL MEDICINE

## 2023-10-01 PROCEDURE — 36415 COLL VENOUS BLD VENIPUNCTURE: CPT | Performed by: INTERNAL MEDICINE

## 2023-10-01 PROCEDURE — 93005 ELECTROCARDIOGRAM TRACING: CPT

## 2023-10-01 PROCEDURE — 83735 ASSAY OF MAGNESIUM: CPT | Performed by: INTERNAL MEDICINE

## 2023-10-01 PROCEDURE — 94640 AIRWAY INHALATION TREATMENT: CPT

## 2023-10-01 PROCEDURE — 99232 SBSQ HOSP IP/OBS MODERATE 35: CPT | Performed by: NURSE PRACTITIONER

## 2023-10-01 PROCEDURE — 80053 COMPREHEN METABOLIC PANEL: CPT | Performed by: INTERNAL MEDICINE

## 2023-10-01 PROCEDURE — 80048 BASIC METABOLIC PNL TOTAL CA: CPT | Mod: CCI | Performed by: NURSE PRACTITIONER

## 2023-10-01 PROCEDURE — 2500000004 HC RX 250 GENERAL PHARMACY W/ HCPCS (ALT 636 FOR OP/ED): Performed by: INTERNAL MEDICINE

## 2023-10-01 PROCEDURE — 2500000002 HC RX 250 W HCPCS SELF ADMINISTERED DRUGS (ALT 637 FOR MEDICARE OP, ALT 636 FOR OP/ED): Performed by: INTERNAL MEDICINE

## 2023-10-01 PROCEDURE — 85027 COMPLETE CBC AUTOMATED: CPT | Mod: 91 | Performed by: NURSE PRACTITIONER

## 2023-10-01 PROCEDURE — G0378 HOSPITAL OBSERVATION PER HR: HCPCS

## 2023-10-01 PROCEDURE — 36415 COLL VENOUS BLD VENIPUNCTURE: CPT | Performed by: NURSE PRACTITIONER

## 2023-10-01 PROCEDURE — 99222 1ST HOSP IP/OBS MODERATE 55: CPT | Performed by: INTERNAL MEDICINE

## 2023-10-01 PROCEDURE — 2580000001 HC RX 258 IV SOLUTIONS: Performed by: INTERNAL MEDICINE

## 2023-10-01 PROCEDURE — 96365 THER/PROPH/DIAG IV INF INIT: CPT

## 2023-10-01 PROCEDURE — 80061 LIPID PANEL: CPT | Performed by: NURSE PRACTITIONER

## 2023-10-01 PROCEDURE — 85027 COMPLETE CBC AUTOMATED: CPT | Performed by: INTERNAL MEDICINE

## 2023-10-01 RX ORDER — IPRATROPIUM BROMIDE AND ALBUTEROL SULFATE 2.5; .5 MG/3ML; MG/3ML
3 SOLUTION RESPIRATORY (INHALATION) 4 TIMES DAILY PRN
Status: DISCONTINUED | OUTPATIENT
Start: 2023-10-01 | End: 2023-10-03 | Stop reason: HOSPADM

## 2023-10-01 RX ORDER — METFORMIN HYDROCHLORIDE 500 MG/1
1000 TABLET ORAL
Status: DISCONTINUED | OUTPATIENT
Start: 2023-10-01 | End: 2023-10-03 | Stop reason: HOSPADM

## 2023-10-01 RX ORDER — QUETIAPINE FUMARATE 25 MG/1
TABLET, FILM COATED ORAL
Status: DISPENSED
Start: 2023-10-01 | End: 2023-10-01

## 2023-10-01 RX ORDER — GABAPENTIN 400 MG/1
400 CAPSULE ORAL EVERY 8 HOURS SCHEDULED
Status: DISCONTINUED | OUTPATIENT
Start: 2023-10-01 | End: 2023-10-03 | Stop reason: HOSPADM

## 2023-10-01 RX ORDER — HYDROCHLOROTHIAZIDE 25 MG/1
12.5 TABLET ORAL DAILY
Status: DISCONTINUED | OUTPATIENT
Start: 2023-10-01 | End: 2023-10-03 | Stop reason: HOSPADM

## 2023-10-01 RX ORDER — ROPINIROLE 1 MG/1
2 TABLET, FILM COATED ORAL NIGHTLY
Status: DISCONTINUED | OUTPATIENT
Start: 2023-10-01 | End: 2023-10-03 | Stop reason: HOSPADM

## 2023-10-01 RX ORDER — MAGNESIUM SULFATE HEPTAHYDRATE 40 MG/ML
2 INJECTION, SOLUTION INTRAVENOUS ONCE
Status: COMPLETED | OUTPATIENT
Start: 2023-10-01 | End: 2023-10-01

## 2023-10-01 RX ORDER — POTASSIUM CHLORIDE 20 MEQ/1
40 TABLET, EXTENDED RELEASE ORAL ONCE
Status: COMPLETED | OUTPATIENT
Start: 2023-10-01 | End: 2023-10-01

## 2023-10-01 RX ORDER — ALPRAZOLAM 0.25 MG/1
0.25 TABLET ORAL 3 TIMES DAILY PRN
Status: DISCONTINUED | OUTPATIENT
Start: 2023-10-01 | End: 2023-10-03 | Stop reason: HOSPADM

## 2023-10-01 RX ORDER — SODIUM CHLORIDE, SODIUM LACTATE, POTASSIUM CHLORIDE, CALCIUM CHLORIDE 600; 310; 30; 20 MG/100ML; MG/100ML; MG/100ML; MG/100ML
100 INJECTION, SOLUTION INTRAVENOUS CONTINUOUS
Status: DISCONTINUED | OUTPATIENT
Start: 2023-10-01 | End: 2023-10-03 | Stop reason: HOSPADM

## 2023-10-01 RX ORDER — FLUTICASONE FUROATE AND VILANTEROL 200; 25 UG/1; UG/1
1 POWDER RESPIRATORY (INHALATION) DAILY
Status: DISCONTINUED | OUTPATIENT
Start: 2023-10-01 | End: 2023-10-03 | Stop reason: HOSPADM

## 2023-10-01 RX ORDER — ATORVASTATIN CALCIUM 80 MG/1
80 TABLET, FILM COATED ORAL DAILY
Status: DISCONTINUED | OUTPATIENT
Start: 2023-10-01 | End: 2023-10-03 | Stop reason: HOSPADM

## 2023-10-01 RX ORDER — HYDRALAZINE HYDROCHLORIDE 50 MG/1
TABLET, FILM COATED ORAL
Status: COMPLETED
Start: 2023-10-01 | End: 2023-10-01

## 2023-10-01 RX ORDER — HYDRALAZINE HYDROCHLORIDE 50 MG/1
50 TABLET, FILM COATED ORAL EVERY 12 HOURS
Status: DISCONTINUED | OUTPATIENT
Start: 2023-10-01 | End: 2023-10-03

## 2023-10-01 RX ORDER — LOSARTAN POTASSIUM 100 MG/1
100 TABLET ORAL DAILY
Status: DISCONTINUED | OUTPATIENT
Start: 2023-10-01 | End: 2023-10-03 | Stop reason: HOSPADM

## 2023-10-01 RX ORDER — REGADENOSON 0.08 MG/ML
0.4 INJECTION, SOLUTION INTRAVENOUS
Status: CANCELLED | OUTPATIENT
Start: 2023-10-01

## 2023-10-01 RX ORDER — QUETIAPINE FUMARATE 25 MG/1
50 TABLET, FILM COATED ORAL NIGHTLY
Status: DISCONTINUED | OUTPATIENT
Start: 2023-10-01 | End: 2023-10-03 | Stop reason: HOSPADM

## 2023-10-01 RX ORDER — ASPIRIN 81 MG/1
81 TABLET ORAL DAILY
Status: DISCONTINUED | OUTPATIENT
Start: 2023-10-01 | End: 2023-10-03 | Stop reason: HOSPADM

## 2023-10-01 RX ORDER — BUPRENORPHINE HYDROCHLORIDE AND NALOXONE HYDROCHLORIDE DIHYDRATE 8; 2 MG/1; MG/1
1 TABLET SUBLINGUAL 2 TIMES DAILY
Status: DISCONTINUED | OUTPATIENT
Start: 2023-10-01 | End: 2023-10-03 | Stop reason: HOSPADM

## 2023-10-01 RX ORDER — POLYETHYLENE GLYCOL 3350 17 G/17G
17 POWDER, FOR SOLUTION ORAL DAILY
Status: DISCONTINUED | OUTPATIENT
Start: 2023-10-01 | End: 2023-10-03 | Stop reason: HOSPADM

## 2023-10-01 RX ORDER — BUPRENORPHINE HYDROCHLORIDE AND NALOXONE HYDROCHLORIDE DIHYDRATE 8; 2 MG/1; MG/1
1 TABLET SUBLINGUAL 2 TIMES DAILY
COMMUNITY

## 2023-10-01 RX ORDER — UBIDECARENONE 75 MG
1000 CAPSULE ORAL DAILY
Status: DISCONTINUED | OUTPATIENT
Start: 2023-10-01 | End: 2023-10-03 | Stop reason: HOSPADM

## 2023-10-01 RX ORDER — ENOXAPARIN SODIUM 100 MG/ML
40 INJECTION SUBCUTANEOUS DAILY
Status: DISCONTINUED | OUTPATIENT
Start: 2023-10-01 | End: 2023-10-03 | Stop reason: HOSPADM

## 2023-10-01 RX ORDER — GABAPENTIN 400 MG/1
CAPSULE ORAL
Status: COMPLETED
Start: 2023-10-01 | End: 2023-10-01

## 2023-10-01 RX ORDER — CHOLECALCIFEROL (VITAMIN D3) 25 MCG
50 TABLET ORAL NIGHTLY
Status: DISCONTINUED | OUTPATIENT
Start: 2023-10-01 | End: 2023-10-03 | Stop reason: HOSPADM

## 2023-10-01 RX ORDER — ALBUTEROL SULFATE 0.83 MG/ML
2.5 SOLUTION RESPIRATORY (INHALATION) EVERY 6 HOURS PRN
Status: DISCONTINUED | OUTPATIENT
Start: 2023-10-01 | End: 2023-10-03 | Stop reason: HOSPADM

## 2023-10-01 RX ORDER — ROPINIROLE 1 MG/1
TABLET, FILM COATED ORAL
Status: COMPLETED
Start: 2023-10-01 | End: 2023-10-01

## 2023-10-01 RX ADMIN — BUPRENORPHINE AND NALOXONE 1 TABLET: 8; 2 TABLET SUBLINGUAL at 02:31

## 2023-10-01 RX ADMIN — POTASSIUM CHLORIDE 40 MEQ: 1500 TABLET, EXTENDED RELEASE ORAL at 11:52

## 2023-10-01 RX ADMIN — SODIUM CHLORIDE, POTASSIUM CHLORIDE, SODIUM LACTATE AND CALCIUM CHLORIDE 100 ML/HR: 600; 310; 30; 20 INJECTION, SOLUTION INTRAVENOUS at 14:29

## 2023-10-01 RX ADMIN — GABAPENTIN 400 MG: 400 CAPSULE ORAL at 14:30

## 2023-10-01 RX ADMIN — ACETAMINOPHEN 975 MG: 325 TABLET ORAL at 02:32

## 2023-10-01 RX ADMIN — Medication 50 MCG: at 21:30

## 2023-10-01 RX ADMIN — METFORMIN HYDROCHLORIDE 1000 MG: 500 TABLET, FILM COATED ORAL at 09:26

## 2023-10-01 RX ADMIN — ATORVASTATIN CALCIUM 80 MG: 80 TABLET, FILM COATED ORAL at 09:27

## 2023-10-01 RX ADMIN — METFORMIN HYDROCHLORIDE 1000 MG: 500 TABLET, FILM COATED ORAL at 18:13

## 2023-10-01 RX ADMIN — ROPINIROLE 2 MG: 1 TABLET, FILM COATED ORAL at 01:30

## 2023-10-01 RX ADMIN — TIOTROPIUM BROMIDE INHALATION SPRAY 2 PUFF: 3.12 SPRAY, METERED RESPIRATORY (INHALATION) at 09:28

## 2023-10-01 RX ADMIN — ENOXAPARIN SODIUM 40 MG: 40 INJECTION SUBCUTANEOUS at 09:28

## 2023-10-01 RX ADMIN — LOSARTAN POTASSIUM 100 MG: 100 TABLET, FILM COATED ORAL at 09:27

## 2023-10-01 RX ADMIN — ROPINIROLE 2 MG: 1 TABLET, FILM COATED ORAL at 21:29

## 2023-10-01 RX ADMIN — BUPRENORPHINE AND NALOXONE 1 TABLET: 8; 2 TABLET SUBLINGUAL at 09:27

## 2023-10-01 RX ADMIN — HYDROCHLOROTHIAZIDE 12.5 MG: 25 TABLET ORAL at 09:27

## 2023-10-01 RX ADMIN — MAGNESIUM SULFATE HEPTAHYDRATE 2 G: 40 INJECTION, SOLUTION INTRAVENOUS at 10:09

## 2023-10-01 RX ADMIN — BUPRENORPHINE AND NALOXONE 1 TABLET: 8; 2 TABLET SUBLINGUAL at 21:30

## 2023-10-01 RX ADMIN — HYDRALAZINE HYDROCHLORIDE 50 MG: 50 TABLET, FILM COATED ORAL at 01:15

## 2023-10-01 RX ADMIN — SODIUM CHLORIDE, POTASSIUM CHLORIDE, SODIUM LACTATE AND CALCIUM CHLORIDE 100 ML/HR: 600; 310; 30; 20 INJECTION, SOLUTION INTRAVENOUS at 02:31

## 2023-10-01 RX ADMIN — GABAPENTIN 400 MG: 400 CAPSULE ORAL at 01:15

## 2023-10-01 RX ADMIN — NICOTINE 1 PATCH: 14 PATCH, EXTENDED RELEASE TRANSDERMAL at 02:31

## 2023-10-01 RX ADMIN — ASPIRIN 81 MG: 81 TABLET, COATED ORAL at 09:26

## 2023-10-01 RX ADMIN — HYDRALAZINE HYDROCHLORIDE 50 MG: 50 TABLET ORAL at 01:15

## 2023-10-01 RX ADMIN — HYDRALAZINE HYDROCHLORIDE 50 MG: 50 TABLET ORAL at 14:30

## 2023-10-01 RX ADMIN — CEFTRIAXONE SODIUM 1 G: 1 INJECTION, SOLUTION INTRAVENOUS at 21:30

## 2023-10-01 RX ADMIN — GABAPENTIN 400 MG: 400 CAPSULE ORAL at 21:30

## 2023-10-01 RX ADMIN — GABAPENTIN 400 MG: 400 CAPSULE ORAL at 09:27

## 2023-10-01 RX ADMIN — CYANOCOBALAMIN TAB 500 MCG 1000 MCG: 500 TAB at 09:28

## 2023-10-01 RX ADMIN — FLUTICASONE FUROATE AND VILANTEROL TRIFENATATE 1 PUFF: 200; 25 POWDER RESPIRATORY (INHALATION) at 09:28

## 2023-10-01 RX ADMIN — PANTOPRAZOLE SODIUM 40 MG: 40 TABLET, DELAYED RELEASE ORAL at 06:08

## 2023-10-01 SDOH — ECONOMIC STABILITY: HOUSING INSECURITY
IN THE LAST 12 MONTHS, WAS THERE A TIME WHEN YOU DID NOT HAVE A STEADY PLACE TO SLEEP OR SLEPT IN A SHELTER (INCLUDING NOW)?: NO

## 2023-10-01 SDOH — HEALTH STABILITY: MENTAL HEALTH
STRESS IS WHEN SOMEONE FEELS TENSE, NERVOUS, ANXIOUS, OR CAN'T SLEEP AT NIGHT BECAUSE THEIR MIND IS TROUBLED. HOW STRESSED ARE YOU?: RATHER MUCH

## 2023-10-01 SDOH — HEALTH STABILITY: PHYSICAL HEALTH: ON AVERAGE, HOW MANY MINUTES DO YOU ENGAGE IN EXERCISE AT THIS LEVEL?: 0 MIN

## 2023-10-01 SDOH — ECONOMIC STABILITY: HOUSING INSECURITY: IN THE LAST 12 MONTHS, HOW MANY PLACES HAVE YOU LIVED?: 1

## 2023-10-01 SDOH — ECONOMIC STABILITY: FOOD INSECURITY: WITHIN THE PAST 12 MONTHS, THE FOOD YOU BOUGHT JUST DIDN'T LAST AND YOU DIDN'T HAVE MONEY TO GET MORE.: NEVER TRUE

## 2023-10-01 SDOH — SOCIAL STABILITY: SOCIAL INSECURITY: DO YOU FEEL UNSAFE GOING BACK TO THE PLACE WHERE YOU ARE LIVING?: NO

## 2023-10-01 SDOH — SOCIAL STABILITY: SOCIAL INSECURITY: HAS ANYONE EVER THREATENED TO HURT YOUR FAMILY OR YOUR PETS?: NO

## 2023-10-01 SDOH — ECONOMIC STABILITY: INCOME INSECURITY: HOW HARD IS IT FOR YOU TO PAY FOR THE VERY BASICS LIKE FOOD, HOUSING, MEDICAL CARE, AND HEATING?: HARD

## 2023-10-01 SDOH — SOCIAL STABILITY: SOCIAL INSECURITY: DO YOU FEEL ANYONE HAS EXPLOITED OR TAKEN ADVANTAGE OF YOU FINANCIALLY OR OF YOUR PERSONAL PROPERTY?: NO

## 2023-10-01 SDOH — ECONOMIC STABILITY: FOOD INSECURITY: WITHIN THE PAST 12 MONTHS, YOU WORRIED THAT YOUR FOOD WOULD RUN OUT BEFORE YOU GOT MONEY TO BUY MORE.: NEVER TRUE

## 2023-10-01 SDOH — ECONOMIC STABILITY: INCOME INSECURITY: IN THE LAST 12 MONTHS, WAS THERE A TIME WHEN YOU WERE NOT ABLE TO PAY THE MORTGAGE OR RENT ON TIME?: NO

## 2023-10-01 SDOH — HEALTH STABILITY: PHYSICAL HEALTH: ON AVERAGE, HOW MANY DAYS PER WEEK DO YOU ENGAGE IN MODERATE TO STRENUOUS EXERCISE (LIKE A BRISK WALK)?: 0 DAYS

## 2023-10-01 SDOH — ECONOMIC STABILITY: TRANSPORTATION INSECURITY
IN THE PAST 12 MONTHS, HAS LACK OF TRANSPORTATION KEPT YOU FROM MEETINGS, WORK, OR FROM GETTING THINGS NEEDED FOR DAILY LIVING?: NO

## 2023-10-01 SDOH — SOCIAL STABILITY: SOCIAL INSECURITY: HAVE YOU HAD THOUGHTS OF HARMING ANYONE ELSE?: NO

## 2023-10-01 SDOH — ECONOMIC STABILITY: TRANSPORTATION INSECURITY
IN THE PAST 12 MONTHS, HAS THE LACK OF TRANSPORTATION KEPT YOU FROM MEDICAL APPOINTMENTS OR FROM GETTING MEDICATIONS?: NO

## 2023-10-01 SDOH — SOCIAL STABILITY: SOCIAL INSECURITY: ABUSE: ADULT

## 2023-10-01 SDOH — SOCIAL STABILITY: SOCIAL INSECURITY: DOES ANYONE TRY TO KEEP YOU FROM HAVING/CONTACTING OTHER FRIENDS OR DOING THINGS OUTSIDE YOUR HOME?: NO

## 2023-10-01 SDOH — SOCIAL STABILITY: SOCIAL INSECURITY: ARE THERE ANY APPARENT SIGNS OF INJURIES/BEHAVIORS THAT COULD BE RELATED TO ABUSE/NEGLECT?: NO

## 2023-10-01 SDOH — SOCIAL STABILITY: SOCIAL INSECURITY: ARE YOU OR HAVE YOU BEEN THREATENED OR ABUSED PHYSICALLY, EMOTIONALLY, OR SEXUALLY BY ANYONE?: NO

## 2023-10-01 ASSESSMENT — ENCOUNTER SYMPTOMS
VISUAL HALOS: 0
HEMOPTYSIS: 0
SPUTUM PRODUCTION: 0
FEVER: 0
EYES NEGATIVE: 1
COLOR CHANGE: 0
SUSPICIOUS LESIONS: 0
PND: 0
LIGHT-HEADEDNESS: 0
WHEEZING: 1
ALLERGIC/IMMUNOLOGIC NEGATIVE: 1
HEMATOLOGIC/LYMPHATIC NEGATIVE: 1
NAIL CHANGES: 0
VOMITING: 0
SYNCOPE: 0
DECREASED APPETITE: 0
NEAR-SYNCOPE: 0
ORTHOPNEA: 0
NAUSEA: 0
DYSPNEA ON EXERTION: 0
MUSCULOSKELETAL NEGATIVE: 1
UNUSUAL HAIR DISTRIBUTION: 0
ABDOMINAL PAIN: 0
DIZZINESS: 0
PALPITATIONS: 1
DIAPHORESIS: 0
CHILLS: 0
POOR WOUND HEALING: 0
NEUROLOGICAL NEGATIVE: 1
DIARRHEA: 0
NIGHT SWEATS: 1
ENDOCRINE NEGATIVE: 1
CONSTIPATION: 0
NUMBNESS: 0
PSYCHIATRIC NEGATIVE: 1
IRREGULAR HEARTBEAT: 0
COUGH: 0
SHORTNESS OF BREATH: 1
CLAUDICATION: 0

## 2023-10-01 ASSESSMENT — ACTIVITIES OF DAILY LIVING (ADL)
JUDGMENT_ADEQUATE_SAFELY_COMPLETE_DAILY_ACTIVITIES: YES
HEARING - RIGHT EAR: FUNCTIONAL
ADEQUATE_TO_COMPLETE_ADL: YES
PATIENT'S MEMORY ADEQUATE TO SAFELY COMPLETE DAILY ACTIVITIES?: YES
HEARING - LEFT EAR: FUNCTIONAL
DRESSING YOURSELF: INDEPENDENT
GROOMING: INDEPENDENT
WALKS IN HOME: INDEPENDENT
TOILETING: INDEPENDENT
BATHING: INDEPENDENT
FEEDING YOURSELF: INDEPENDENT

## 2023-10-01 ASSESSMENT — LIFESTYLE VARIABLES
HOW MANY STANDARD DRINKS CONTAINING ALCOHOL DO YOU HAVE ON A TYPICAL DAY: PATIENT DOES NOT DRINK
SKIP TO QUESTIONS 9-10: 1
PRESCIPTION_ABUSE_PAST_12_MONTHS: NO
SUBSTANCE_ABUSE_PAST_12_MONTHS: YES
HOW OFTEN DO YOU HAVE 6 OR MORE DRINKS ON ONE OCCASION: NEVER
AUDIT-C TOTAL SCORE: 0
AUDIT-C TOTAL SCORE: 0
HOW OFTEN DO YOU HAVE A DRINK CONTAINING ALCOHOL: NEVER

## 2023-10-01 ASSESSMENT — PAIN DESCRIPTION - DESCRIPTORS: DESCRIPTORS: ACHING;THROBBING

## 2023-10-01 ASSESSMENT — COGNITIVE AND FUNCTIONAL STATUS - GENERAL
CLIMB 3 TO 5 STEPS WITH RAILING: A LITTLE
DAILY ACTIVITIY SCORE: 24
MOBILITY SCORE: 23

## 2023-10-01 ASSESSMENT — COLUMBIA-SUICIDE SEVERITY RATING SCALE - C-SSRS
6. HAVE YOU EVER DONE ANYTHING, STARTED TO DO ANYTHING, OR PREPARED TO DO ANYTHING TO END YOUR LIFE?: NO
2. HAVE YOU ACTUALLY HAD ANY THOUGHTS OF KILLING YOURSELF?: NO
1. IN THE PAST MONTH, HAVE YOU WISHED YOU WERE DEAD OR WISHED YOU COULD GO TO SLEEP AND NOT WAKE UP?: NO

## 2023-10-01 ASSESSMENT — PAIN SCALES - GENERAL
PAINLEVEL_OUTOF10: 5 - MODERATE PAIN
PAINLEVEL_OUTOF10: 5 - MODERATE PAIN

## 2023-10-01 ASSESSMENT — PATIENT HEALTH QUESTIONNAIRE - PHQ9
SUM OF ALL RESPONSES TO PHQ9 QUESTIONS 1 & 2: 6
1. LITTLE INTEREST OR PLEASURE IN DOING THINGS: NEARLY EVERY DAY
2. FEELING DOWN, DEPRESSED OR HOPELESS: NEARLY EVERY DAY

## 2023-10-01 ASSESSMENT — PAIN - FUNCTIONAL ASSESSMENT: PAIN_FUNCTIONAL_ASSESSMENT: 0-10

## 2023-10-01 NOTE — H&P
History Of Present Illness  Rachael Ruiz is a 58 y.o. female presenting with multiple medical complaints.  She reported dysuria for several days associated with on and off sweating episodes, back pain, and not feeling well in general.  She denied nausea or vomiting.  Patient also reported some shortness of breath, intermittent chest discomfort, and cough for few weeks.  She had history of lower back pain and surgery and thought she might have infection in her back.  She was drinking plenty of water.    Apparently patient was following up with cardiologist Dr. Clark.  She mentioned that she needed a stress test but was scared from the contrast injection.  She is willing to do a stress test this time.     ER course: Patient was awake, alert, oriented, in no acute distress.  Her vitals were stable.  Her initial labs showed negative troponin, no leukocytosis, normal lactate.  proBNP was 163.  Urine analysis was suggestive of urinary tract infection with large leukocyte esterase and WBC more than 50.  Chest x-ray reported negative.  COVID-19 test also negative.  EKG was sinus rhythm without specific ischemic changes.  Patient was feeling weak, and requested to get stress test done this time.  Placed in observation for urinary tract infection and possible stress test if cleared by cardiology.      Past Medical History  She has a past medical history of Allergic (2017), Anxiety (1985), Arthritis (2017), Cataract (2018), COPD (chronic obstructive pulmonary disease) (CMS/[facility]) (2017), Depression (1985), Diabetes mellitus (CMS/[facility]) (1986), HL (hearing loss) (1998), Hypertension (1986), Personal history of nicotine dependence (08/12/2021), Substance abuse (CMS/[facility]) (2015), and Visual impairment (1970).    Surgical History  She has a past surgical history that includes Other surgical history (07/10/2019); Other surgical history (07/10/2019); Other surgical history (07/10/2019); Other surgical history (07/10/2019); Other  surgical history (01/11/2022); Other surgical history (01/11/2022); Other surgical history (01/11/2022); Other surgical history (01/11/2022); Other surgical history (01/11/2022); XR chest pacemaker w fluoro (04/15/2019); Back surgery (2017); Eye surgery (2019); and Hysterectomy (1990).     Social History  She reports that she has been smoking cigarettes. She started smoking about 49 years ago. She has been smoking an average of .5 packs per day. She has never used smokeless tobacco. She reports that she does not currently use alcohol. She reports current drug use. Drug: Marijuana.    Family History  Family History   Adopted: Yes   Problem Relation Name Age of Onset    Skin cancer Mother      Other (cardiac disorder) Father Julio C Storey (biological parent)     Alcohol abuse Father Julio C Storey (biological parent)     Diabetes Father Julio C Storey (biological parent)     Depression Father's Sister Valerie Alejandre     Diabetes Father's Sister Valerie Alejandre - Gin         Allergies  Naproxen, Nsaids (non-steroidal anti-inflammatory drug), Pregabalin, Sulfa (sulfonamide antibiotics), and Tramadol    Review of Systems  10/10 points review of system were conducted, negative except as above  Physical Exam  Constitutional:       Appearance: She is obese.   HENT:      Head: Normocephalic and atraumatic.      Mouth/Throat:      Mouth: Mucous membranes are dry.   Eyes:      Pupils: Pupils are equal, round, and reactive to light.   Cardiovascular:      Rate and Rhythm: Normal rate and regular rhythm.      Heart sounds: No murmur heard.     No friction rub. No gallop.   Pulmonary:      Effort: Pulmonary effort is normal.      Breath sounds: No wheezing.   Abdominal:      General: There is no distension.      Palpations: There is no mass.      Tenderness: There is no abdominal tenderness.   Musculoskeletal:         General: No swelling.      Cervical back: Normal range of motion and neck supple.      Right lower leg: No  edema.      Left lower leg: No edema.   Skin:     General: Skin is warm and dry.   Neurological:      General: No focal deficit present.      Mental Status: She is alert and oriented to person, place, and time.   Psychiatric:         Mood and Affect: Mood normal.         Last Recorded Vitals  BP (!) 188/108 (BP Location: Right arm)   Pulse 71   Temp 38.2 °C (100.8 °F) (Temporal)   Resp 20   Wt 72.6 kg (160 lb 0.9 oz)   SpO2 92%     Relevant Results    Current Outpatient Medications   Medication Instructions    albuterol 2.5 mg /3 mL (0.083 %) nebulizer solution 1 ampule, nebulization, Every 4 hours PRN    albuterol 90 mcg/actuation inhaler 1-2 puffs, inhalation, Every 4 hours PRN, Every 4-6 hours if needed    ALPRAZolam (NIRAVAM) 0.5 mg, oral, 3 times daily PRN, Allow to dissolve    aspirin 81 mg EC tablet 1 tablet, oral, Daily    atorvastatin (LIPITOR) 80 mg, oral, Daily    blood sugar diagnostic (OneTouch Ultra Test) strip TEST 4 TIMES DAILY    cholecalciferol (Vitamin D-3) 50 mcg (2,000 unit) capsule 1 capsule, oral, Nightly    cyanocobalamin (Vitamin B-12) 1,000 mcg tablet 1 tablet, oral, Daily    docosahexaenoic acid/epa (FISH OIL ORAL) 1,000 mg, oral, 2 times daily    gabapentin (NEURONTIN) 800 mg, oral, 4 times daily RT    hydrALAZINE (APRESOLINE) 50 mg, oral, Every 12 hours    hydroCHLOROthiazide (HYDRODIURIL) 12.5 mg, oral, Daily    lancets misc miscellaneous, 3 times daily    lidocaine (Lidoderm) 5 % patch APPLY 1 PATCH TO THE AFFECTED AREA AND LEAVE IN PLACE FOR 12 HOURS, THEN REMOVE AND LEAVE OFF FOR 12 HOURS.    losartan (COZAAR) 100 mg, oral, Daily    metFORMIN (GLUCOPHAGE) 1,000 mg, oral, 2 times daily with meals    nebulizer and compressor device Use as directed.    QUEtiapine (SEROQUEL) 50 mg, oral, Nightly PRN    rOPINIRole (REQUIP) 2 mg, oral, Nightly    Spiriva Respimat 2.5 mcg/actuation inhaler 2 puffs, inhalation, Daily    Trulicity 1.5 mg, subcutaneous, Weekly      Scheduled  medications  Scheduled medications  acetaminophen, 975 mg, oral, Once  cefTRIAXone, 1 g, intravenous, Once  cefTRIAXone, 1 g, intravenous, q24h  labetaloL, 20 mg, intravenous, Once  nicotine, 1 patch, transdermal, Daily  [START ON 10/1/2023] pantoprazole, 40 mg, oral, Daily before breakfast      Continuous medications     PRN medications  PRN medications: acetaminophen   Continuous medications     Results for orders placed or performed during the hospital encounter of 09/30/23 (from the past 24 hour(s))   CBC and Auto Differential   Result Value Ref Range    WBC 10.8 4.4 - 11.3 x10*3/uL    nRBC 0.0 0.0 - 0.0 /100 WBCs    RBC 3.53 (L) 4.00 - 5.20 x10*6/uL    Hemoglobin 11.6 (L) 12.0 - 16.0 g/dL    Hematocrit 33.4 (L) 36.0 - 46.0 %    MCV 95 80 - 100 fL    MCH 32.9 26.0 - 34.0 pg    MCHC 34.7 32.0 - 36.0 g/dL    RDW 13.5 11.5 - 14.5 %    Platelets 314 150 - 450 x10*3/uL    MPV 9.2 7.5 - 11.5 fL    Neutrophils % 68.2 40.0 - 80.0 %    Immature Granulocytes %, Automated 0.7 0.0 - 0.9 %    Lymphocytes % 19.4 13.0 - 44.0 %    Monocytes % 10.4 2.0 - 10.0 %    Eosinophils % 0.9 0.0 - 6.0 %    Basophils % 0.4 0.0 - 2.0 %    Neutrophils Absolute 7.33 1.20 - 7.70 x10*3/uL    Immature Granulocytes Absolute, Automated 0.07 0.00 - 0.70 x10*3/uL    Lymphocytes Absolute 2.09 1.20 - 4.80 x10*3/uL    Monocytes Absolute 1.12 (H) 0.10 - 1.00 x10*3/uL    Eosinophils Absolute 0.10 0.00 - 0.70 x10*3/uL    Basophils Absolute 0.04 0.00 - 0.10 x10*3/uL   Comprehensive Metabolic Panel   Result Value Ref Range    Glucose 109 (H) 74 - 99 mg/dL    Sodium 142 136 - 145 mmol/L    Potassium 3.5 3.5 - 5.3 mmol/L    Chloride 104 98 - 107 mmol/L    Bicarbonate 26 21 - 32 mmol/L    Anion Gap 16 10 - 20 mmol/L    Urea Nitrogen 13 6 - 23 mg/dL    Creatinine 0.73 0.50 - 1.05 mg/dL    eGFR >90 >60 mL/min/1.73m*2    Calcium 8.9 8.6 - 10.3 mg/dL    Albumin 3.9 3.4 - 5.0 g/dL    Alkaline Phosphatase 95 33 - 110 U/L    Total Protein 7.5 6.4 - 8.2 g/dL    AST 17  9 - 39 U/L    Bilirubin, Total 0.4 0.0 - 1.2 mg/dL    ALT 17 7 - 45 U/L   Magnesium   Result Value Ref Range    Magnesium 1.64 1.60 - 2.40 mg/dL   Lactate   Result Value Ref Range    Lactate 0.9 0.4 - 2.0 mmol/L   Protime-INR   Result Value Ref Range    Protime 13.0 (H) 9.8 - 12.8 seconds    INR 1.2 (H) 0.9 - 1.1   B-Type Natriuretic Peptide   Result Value Ref Range     (H) 0 - 99 pg/mL   Troponin I, High Sensitivity, Initial   Result Value Ref Range    Troponin I, High Sensitivity 5 0 - 13 ng/L   Beta Hydroxybutyrate   Result Value Ref Range    Beta-Hydroxybutyrate 0.19 0.02 - 0.27 mmol/L   Sars-CoV-2 RT PCR, Symptomatic   Result Value Ref Range    Coronavirus 2019, PCR Not Detected Not Detected   Urinalysis with Reflex Microscopic   Result Value Ref Range    Color, Urine Yellow Straw, Yellow    Appearance, Urine Hazy (N) Clear    Specific Gravity, Urine 1.016 1.005 - 1.035    pH, Urine 7.0 5.0, 5.5, 6.0, 6.5, 7.0, 7.5, 8.0    Protein, Urine 100 (2+) (N) NEGATIVE mg/dL    Glucose, Urine NEGATIVE NEGATIVE mg/dL    Blood, Urine NEGATIVE NEGATIVE    Ketones, Urine NEGATIVE NEGATIVE mg/dL    Bilirubin, Urine NEGATIVE NEGATIVE    Urobilinogen, Urine <2.0 <2.0 mg/dL    Nitrite, Urine NEGATIVE NEGATIVE    Leukocyte Esterase, Urine LARGE (3+) (A) NEGATIVE   Urinalysis Microscopic Only   Result Value Ref Range    WBC, Urine >50 (A) 1-5, NONE /HPF    RBC, Urine >20 (A) NONE, 1-2, 3-5 /HPF    Squamous Epithelial Cells, Urine 26-50 (1+) Reference range not established. /HPF   Troponin I, High Sensitivity   Result Value Ref Range    Troponin I, High Sensitivity 6 0 - 13 ng/L   TSH with reflex to Free T4 if abnormal   Result Value Ref Range    Thyroid Stimulating Hormone 0.72 0.44 - 3.98 mIU/L      XR chest 1 view    Result Date: 9/30/2023  Interpreted By:  Toni Sotelo, STUDY: XR CHEST 1 VIEW;  9/30/2023 4:39 pm   INDICATION: Signs/Symptoms:SOB.   COMPARISON: 02/17/2023.   ACCESSION NUMBER(S): [id number]   ORDERING  CLINICIAN: AUBRIE MIKE   FINDINGS:         CARDIOMEDIASTINAL SILHOUETTE: Cardiomediastinal silhouette is normal in size and configuration.   LUNGS: Lungs are clear.   ABDOMEN: No remarkable upper abdominal findings.   BONES: No acute osseous changes.       1.  No evidence of acute cardiopulmonary process.       MACRO: None   Signed by: Toni Tremaynetessa 9/30/2023 5:08 PM Dictation workstation:   [id number]          Assessment/Plan   Principal Problem:    Urinary tract infection  Patient did have mild fever, had dysuria before admission.  She denies CVA tenderness.  There is no leukocytosis and lactate was within normal range.  Plan:  -Continue with Rocephin.  -Follow urine culture.  -Follow-up blood culture.  -Tylenol for fever control.  -Gentle IV fluid hydration overnight.  -If the patient continues to spike fever then will obtain CT scan of the abdomen to rule out any ascending infection.    Dyspnea on exertion:  -Reported dyspnea on exertion lately.  She had episode of diaphoresis. She has history of COPD and active tobacco smoker.  -She mentioned that her cardiologist wanted stress test performed because she had some calcification in her arteries.  -We will obtain echocardiogram.  -We will request cardiology consultation for further evaluation, possible stress test before discharge.    COPD:  -No significant changes in her cough.    -We will use DuoNeb as needed for bronchodilation.  -Spiriva.  -Symbicort    Tobacco smoking:  -1 pack/day.  -We will use nicotine patch, and as needed in the hospital.  -Counseling provided about smoking cessation.  Patient mentions she is trying to quit but not able to    Hypertension:  -Blood pressure was elevated in the ER required labetalol.  -We will resume home medication including losartan, hydrochlorothiazide.    Restless leg syndrome:  -Continue ropinirole    Anxiety and depression:  -Resume Seroquel and Xanax as needed        CODE STATUS: Full code  DVT prophylaxis:  Lovenox  GI prophylaxis: Pantoprazole             Anders Boyer MD

## 2023-10-01 NOTE — CONSULTS
Consults  Cardiology Consult Note      Date:   10/1/2023  Patient name:  Rachael Ruiz  Date of admission:  9/30/2023  6:19 PM  MRN:   [id number]  YOB: 1965  Time of Consult:  9:18 AM    Consulting Cardiologist: Dr Vaibhav MD  Primary Cardiologist:  Dr. Kain Clark    Referring Provider: Dr Boyer    HPI:   Rachael Ruiz is a 58 y.o.  female patient who is being at the request of Dr. Boyer for inpatient consultation of  shortness of breath . She was admitted on 9/30/2023.  Previous [facility] and [facility] records have been reviewed in detail.  Patient is very well-known patient of Dr. Clark.  She states that for the last couple months he wanted her to get a stress test but she has been very anxious about getting the stress test.  She states that she came in was admitted for a urinary tract infection but she did go on to state that lately she has been having shortness of breath and diaphoresis they wanted to admit her overnight treated with antibiotics get an echo and asked us if we would be willing to do an inpatient Lexiscan nuclear stress test.  She denies fever, chills, nausea vomit, vomiting, PND, orthopnea, claudication    Past medical history includes hypertension, diabetes, dyslipidemia, neuropathy, COPD    EKG is normal sinus rhythm no acute changes QTc is 526    Telemetry monitor is normal sinus rhythm no ectopy      Allergies:  Allergies   Allergen Reactions    Naproxen Hives    Nsaids (Non-Steroidal Anti-Inflammatory Drug) Unknown    Pregabalin Unknown    Sulfa (Sulfonamide Antibiotics) Hives    Tramadol Hives       Past Medical History:  Past Medical History:   Diagnosis Date    Allergic 2017    Anxiety 1985    Arthritis 2017    Cataract 2018    COPD (chronic obstructive pulmonary disease) (CMS/[facility]) 2017    Depression 1985    Diabetes mellitus (CMS/[facility]) 1986    HL (hearing loss) 1998    Hypertension 1986    Personal history of nicotine dependence 08/12/2021    Personal history of  nicotine dependence    Substance abuse (CMS/[facility]) 2015    Visual impairment 1970       Past Surgical History:  Past Surgical History:   Procedure Laterality Date    BACK SURGERY  2017    EYE SURGERY  2019    HYSTERECTOMY  1990    OTHER SURGICAL HISTORY  07/10/2019    Tonsillectomy    OTHER SURGICAL HISTORY  07/10/2019    Back surgery    OTHER SURGICAL HISTORY  07/10/2019    Hysterectomy    OTHER SURGICAL HISTORY  07/10/2019    Carpal tunnel surgery    OTHER SURGICAL HISTORY  01/11/2022    Throat surgery    OTHER SURGICAL HISTORY  01/11/2022    Knee surgery    OTHER SURGICAL HISTORY  01/11/2022    Breast surgery    OTHER SURGICAL HISTORY  01/11/2022    Cataract surgery    OTHER SURGICAL HISTORY  01/11/2022    Toe fracture repair    XR CHEST PACEMAKER WITH FLUORO  04/15/2019    XR CHEST PACEMAKER WITH FLUORO 4/15/2019 [facility]       Family History:   @[facility]@    Social  History:     Social History     Tobacco Use    Smoking status: Every Day     Packs/day: .5     Types: Cigarettes     Start date: 8/14/1974    Smokeless tobacco: Never    Tobacco comments:     I was introduced to cigarettes in 5th grade and got hooked really, really quick. 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       Home Medications:    Prior to Admission medications    Medication Sig Start Date End Date Taking? Authorizing Provider   albuterol 2.5 mg /3 mL (0.083 %) nebulizer solution Take 1 ampule by nebulization every 4 (four) hours if needed. 11/18/18   Historical Provider, MD   albuterol 90 mcg/actuation inhaler Inhale 1-2 puffs every 4 hours if needed for wheezing or shortness of breath. Every 4-6 hours if needed  Patient not taking: Reported on 10/1/2023 8/21/23   Pineda Bauer, DO   ALPRAZolam (Niravam) 0.5 mg disintegrating tablet Take 1 tablet (0.5 mg) by mouth 3 times a day as needed. Allow to dissolve    Historical Provider, MD   aspirin 81 mg  EC tablet Take 1 tablet (81 mg) by mouth once daily. 8/5/19   Historical Provider, MD   atorvastatin (Lipitor) 80 mg tablet Take 1 tablet (80 mg) by mouth once daily. 8/23/23   Pineda Bauer DO   blood sugar diagnostic (OneTouch Ultra Test) strip TEST 4 TIMES DAILY 7/17/23   Pineda Bauer DO   buprenorphine-naloxone (Suboxone) 8-2 mg SL tablet Place 1 tablet under the tongue 2 times a day.    Historical Provider, MD   cholecalciferol (Vitamin D-3) 50 mcg (2,000 unit) capsule Take 1 capsule (50 mcg) by mouth once daily at bedtime. 11/25/19   Historical Provider, MD   cyanocobalamin (Vitamin B-12) 1,000 mcg tablet Take 1 tablet (1,000 mcg) by mouth once daily. 4/6/20   Historical Provider, MD   docosahexaenoic acid/epa (FISH OIL ORAL) Take 1,000 mg by mouth in the morning and at bedtime.    Historical Provider, MD   dulaglutide (Trulicity) 1.5 mg/0.5 mL pen injector injection Inject 1.5 mg under the skin 1 (one) time per week. 9/13/23   Pineda Bauer DO   gabapentin (Neurontin) 800 mg tablet Take 1 tablet (800 mg) by mouth 4 times a day. 9/15/23   Pineda Bauer DO   hydrALAZINE (Apresoline) 50 mg tablet Take 1 tablet (50 mg) by mouth every 12 hours.  Patient not taking: Reported on 8/29/2023 7/19/23   Pineda Bauer DO   hydroCHLOROthiazide (HYDRODiuril) 12.5 mg tablet Take 1 tablet (12.5 mg) by mouth once daily. 6/30/23   Pineda Bauer DO   lancets misc in the morning, at noon, and at bedtime.    Historical Provider, MD   lidocaine (Lidoderm) 5 % patch APPLY 1 PATCH TO THE AFFECTED AREA AND LEAVE IN PLACE FOR 12 HOURS, THEN REMOVE AND LEAVE OFF FOR 12 HOURS.  Patient not taking: Reported on 10/1/2023 7/19/23   Pindea Bauer DO   losartan (Cozaar) 100 mg tablet Take 1 tablet (100 mg) by mouth once daily. 9/13/23   Pineda Bauer DO   metFORMIN (Glucophage) 1,000 mg tablet Take 1 tablet (1,000 mg) by mouth 2 times a day with meals. 6/30/23   Pineda Bauer DO   nebulizer and compressor device Use as directed. 3/20/23   Pineda Bauer DO    QUEtiapine (SEROquel) 50 mg tablet Take 1 tablet (50 mg) by mouth as needed at bedtime. 7/25/22   Historical Provider, MD   rOPINIRole (Requip) 1 mg tablet Take 2 tablets (2 mg) by mouth once daily at bedtime. 5/19/20   Historical Provider, MD   Spiriva Respimat 2.5 mcg/actuation inhaler INHALE 2 PUFFS ONCE DAILY 9/12/23   Pineda Bauer,    buprenorphine-naloxone (Suboxone) 2-0.5 mg SL tablet Place 1 tablet under the tongue twice a day.  9/28/23  Historical Provider, MD   venlafaxine XR (Effexor-XR) 150 mg 24 hr capsule Take 1 capsule (150 mg) by mouth once daily. 3/27/20 9/28/23  Historical Provider, MD       Current Medications:  lactated Ringer's, 100 mL/hr, Last Rate: 100 mL/hr (10/01/23 0231)        IV Medications:  [unfilled]    ROS:     Review of Systems   Constitutional: Positive for night sweats. Negative for chills, decreased appetite, diaphoresis, fever and malaise/fatigue.   HENT: Negative.     Eyes: Negative.  Negative for visual disturbance and visual halos.   Cardiovascular:  Positive for palpitations. Negative for chest pain, claudication, cyanosis, dyspnea on exertion, irregular heartbeat, leg swelling, near-syncope, orthopnea, paroxysmal nocturnal dyspnea and syncope.   Respiratory:  Positive for shortness of breath and wheezing. Negative for cough, hemoptysis and sputum production.    Endocrine: Negative.    Hematologic/Lymphatic: Negative.    Skin:  Negative for color change, dry skin, flushing, itching, nail changes, poor wound healing, rash, skin cancer, suspicious lesions and unusual hair distribution.   Musculoskeletal: Negative.    Gastrointestinal:  Negative for abdominal pain, constipation, diarrhea, nausea and vomiting.   Genitourinary: Negative.    Neurological: Negative.  Negative for dizziness, light-headedness and numbness.   Psychiatric/Behavioral: Negative.     Allergic/Immunologic: Negative.           Vital Signs:  Vitals:    09/30/23 2300 10/01/23 0039 10/01/23 0403 10/01/23 0700    BP: 159/84 159/84 141/68 173/84   BP Location: Right arm Right arm Right arm    Patient Position: Lying Lying Lying    Pulse: 67 69 64 67   Resp: 20 20 18 18   Temp: 35.7 °C (96.3 °F) 35.7 °C (96.3 °F) 36.1 °C (97 °F) 36.4 °C (97.5 °F)   TempSrc:  Temporal Temporal Temporal   SpO2: 98% 96% 97% 94%   Weight:  75.2 kg (165 lb 12.6 oz)     Height:  1.524 m (5')       No intake or output data in the 24 hours ending 10/01/23 0918    [unfilled]    Physical Examination:  Constitutional:       Appearance: Not in distress.   Eyes:      Pupils: Pupils are equal, round, and reactive to light.   HENT:      Head: Normocephalic.   Neck:      Thyroid: No thyroid mass.      Vascular: JVD normal.   Pulmonary:      Effort: Pulmonary effort is normal. No tachypnea or bradypnea.      Breath sounds: Decreased breath sounds present.   Chest:      Chest wall: Not tender to palpatation.   Cardiovascular:      PMI at left midclavicular line. Normal rate. Regular rhythm. Normal S1. Normal S2.       Murmurs: There is no murmur.      No gallop.  No click. No rub.   Pulses:     Intact distal pulses.   Abdominal:      General: Bowel sounds are normal. There is no distension.      Palpations: Abdomen is soft.   Skin:     General: Skin is warm and dry.   Neurological:      Mental Status: Alert.   Psychiatric:         Behavior: Behavior is cooperative.          Diagnostics:    EKG: NSR qtc 526     Telemetry:  NSR PAC .    Lab Data:  BMP:  Results from last 7 days   Lab Units 10/01/23  0534 09/30/23  1528   SODIUM mmol/L 141 142   POTASSIUM mmol/L 3.2* 3.5   CHLORIDE mmol/L 104 104   CO2 mmol/L 29 26   BUN mg/dL 14 13   CREATININE mg/dL 0.84 0.73       CBC:  Results from last 7 days   Lab Units 10/01/23  0534 09/30/23  1528   WBC AUTO x10*3/uL 10.6 10.8   RBC AUTO x10*6/uL 3.36* 3.53*   HEMOGLOBIN g/dL 10.6* 11.6*   HEMATOCRIT % 32.2* 33.4*   MCV fL 96 95   MCH pg 31.5 32.9   MCHC g/dL 32.9 34.7   RDW % 13.4 13.5   PLATELETS AUTO x10*3/uL 287  "314   MPV fL 9.3 9.2       Cardiac Enzymes:   Results from last 7 days   Lab Units 10/01/23  0534 09/30/23  1959 09/30/23  1528   TROPHS ng/L 6 6 5         Hepatic Function Panel:  Results from last 7 days   Lab Units 10/01/23  0534 09/30/23  1528   ALK PHOS U/L 79 95   ALT U/L 16 17   AST U/L 15 17   PROTEIN TOTAL g/dL 6.7 7.5   BILIRUBIN TOTAL mg/dL 0.3 0.4       Magnesium:  Results from last 7 days   Lab Units 10/01/23  0534   MAGNESIUM mg/dL 1.50*       Pro-BNP:  No results found for: \"PROBNP\"    INR:  Results from last 7 days   Lab Units 09/30/23  1528   PROTIME seconds 13.0*   INR  1.2*       TSH:  Lab Results   Component Value Date    TSH 0.67 10/01/2023       Lipid Profile:        No lab exists for component: \"LABVLDL\"    HgbA1C:        Lactate Level:  No lab exists for component: \"LACTA\"    CMP:  Results from last 7 days   Lab Units 10/01/23  0534 09/30/23  1528   SODIUM mmol/L 141 142   POTASSIUM mmol/L 3.2* 3.5   CHLORIDE mmol/L 104 104   CO2 mmol/L 29 26   BUN mg/dL 14 13   CREATININE mg/dL 0.84 0.73   GLUCOSE mg/dL 132* 109*   CALCIUM mg/dL 8.7 8.9   PROTEIN TOTAL g/dL 6.7 7.5   BILIRUBIN TOTAL mg/dL 0.3 0.4   ALK PHOS U/L 79 95   AST U/L 15 17   ALT U/L 16 17       Amylase:        Lipase:        ABG:        No lab exists for component: \"PO2\", \"PCO2\", \"HCO3\", \"BE\", \"O2SAT\"      Radiology:   XR chest 1 view   Final Result   1.  No evidence of acute cardiopulmonary process.                  MACRO:   None        Signed by: Toni Sotelo 9/30/2023 5:08 PM   Dictation workstation:   [id number]      Transthoracic Echo (TTE) Complete    (Results Pending)     Echocardiogram 11/04/2022    CONCLUSIONS:   1. Left ventricular systolic function is normal with a 60% estimated ejection fraction.   2. There is no evidence of mitral valve stenosis.   3. Mild mitral valve regurgitation.   4. Mild tricuspid regurgitation is visualized.   5. Aortic valve stenosis is not present.   6. The main pulmonary artery is normal " in size, and position, with normal bifurcation into the left and right pulmonary arteries.    [unfilled]    Impression:   Principal Problem:    Urinary tract infection  Active Problems:    Acute cystitis without hematuria    Patient Active Problem List   Diagnosis    1st MTP arthritis    Abnormal mammogram    Anxiety and depression    Bipolar affective disorder (CMS/HCC)    Chronic constipation    Chronic low back pain with sciatica    Type 2 diabetes mellitus (CMS/HCC)    COPD (chronic obstructive pulmonary disease) (CMS/[facility])    Coronary artery calcification seen on CT scan    Essential hypertension, benign    Forgetfulness    Gait disturbance    Ganglion cyst of wrist    Hematuria    HSV-2 infection    Hyperlipidemia    Insomnia    LVH (left ventricular hypertrophy)    Mass of joint of right wrist    Peripheral neuropathy    Plantar fasciitis    Restless legs syndrome    B12 deficiency    Arthralgia of right knee    CAD (coronary artery disease)    Lumbar spinal stenosis    Lymphocytosis    Neurogenic claudication    PAD (peripheral artery disease) (CMS/[facility])    Compression fracture of thoracic vertebra (CMS/HCC)    Sacroiliitis, not elsewhere classified (CMS/[facility])    Opioid dependence, in remission (CMS/[facility])    Urinary tract infection    Acute cystitis without hematuria     SOB  Palpitations  Hx tobacco abuse  HTN    Recommendations:  -tele monitoring  -daily EKG's  -lipid panel  -2decho  -Keep magnesium greater than 2.0    Thank you for the opportunity to participate in the care of your patient.  Do not hesitate to call if you have any questions.    Electronically signed by Shiva Rosario, GUEVARA-CNP, FACC on 10/1/2023 at 9:18 AM         October 1, 2023.    Patient was seen conjunction with nurse practitioner.  I agree with note above.  Patient was seen, chart reviewed.  History and physical performed 2.    Patient was admitted for worsening shortness of breath.  She apparently had an a stress test ordered in the  past but she was unable to obtain this.  She is agreeable with that.    EKG on arrival shows sinus rhythm.  No significant ST or T wave abnormalities  Troponin x3 negative  TSH 0.67  Hemoglobin 10.8.  Potassium 3.2.  Creatinine 0.8.    Plan    Replace electrolytes.  Keep potassium equal more than 4.0, magnesium equal more than 2.0.  A stress test in a.m. tomorrow  Continue telemetry  Continue with current medical therapy    Cristino Esposito MD

## 2023-10-01 NOTE — PROGRESS NOTES
PROGRESS NOTE    Subjective   Patient seen and examined today.  Resting in bed.  Appears tired, reports that she still is tired.  Continues on supplemental O2.  Awake, alert and oriented and communicating clearly.  No shortness of breath reported.  Has been afebrile.  No nausea or emesis.  Patient vital signs reviewed moderate hypertensive urgency noted blood pressure at 173/84.  Heart rate of 67 bpm.  Has been afebrile.  94% oxygen saturations on supplemental O2.  Patient with an intermittent productive cough with thick white sputum.  Labs show blood glucose at 132, hypokalemia with potassium of 3.2 with sodium of 141 and hypomagnesemia with magnesium of 1.50.  Creatinine 0.84 with a BUN of 14 and GFR of 80.  Troponins x3 unremarkable.  LFTs unremarkable.  TSH within normal limits at 0.67, BHT on admit 0.19.  CBC shows white blood cell count of 10.6 with hemoglobin of 10.6 and hematocrit of 32.2.  Urinalysis with likely UTI, will follow urine cultures.    Objective     Last Recorded Vitals    Visit Vitals  /84   Pulse 67   Temp 36.4 °C (97.5 °F) (Temporal)   Resp 18        3 Day Weight Change: Unable to Calculate     No intake or output data in the 24 hours ending 10/01/23 0942     Review of Systems  10/10 points review of system were conducted, negative except as above    Physical Exam  Constitutional:       Appearance: She is obese.   HENT:      Head: Normocephalic and atraumatic.      Mouth/Throat:      Mouth: Mucous membranes are dry.   Eyes:      Pupils: Pupils are equal, round, and reactive to light.   Cardiovascular:      Rate and Rhythm: Normal rate and regular rhythm.      Heart sounds: No murmur heard.     No friction rub. No gallop.   Pulmonary:      Effort: Pulmonary effort is normal.      Breath sounds: No wheezing.   Abdominal:      General: There is no distension.      Palpations: There is no mass.      Tenderness: There is no abdominal tenderness.   Musculoskeletal:         General: No  swelling.      Cervical back: Normal range of motion and neck supple.      Right lower leg: No edema.      Left lower leg: No edema.   Skin:     General: Skin is warm and dry.   Neurological:      General: No focal deficit present.      Mental Status: She is alert and oriented to person, place, and time.   Psychiatric:         Mood and Affect: Mood normal.     Scheduled medications  aspirin, 81 mg, oral, Daily  atorvastatin, 80 mg, oral, Daily  buprenorphine-naloxone, 1 tablet, sublingual, BID  cefTRIAXone, 1 g, intravenous, q24h  cholecalciferol, 50 mcg, oral, Nightly  cyanocobalamin, 1,000 mcg, oral, Daily  enoxaparin, 40 mg, subcutaneous, Daily  fluticasone furoate-vilanteroL, 1 puff, inhalation, Daily  gabapentin, 400 mg, oral, q8h LES  hydrALAZINE, 50 mg, oral, q12h  hydroCHLOROthiazide, 12.5 mg, oral, Daily  losartan, 100 mg, oral, Daily  magnesium sulfate, 2 g, intravenous, Once  metFORMIN, 1,000 mg, oral, BID with meals  nicotine, 1 patch, transdermal, Daily  pantoprazole, 40 mg, oral, Daily before breakfast  polyethylene glycol, 17 g, oral, Daily  QUEtiapine, 50 mg, oral, Nightly  rOPINIRole, 2 mg, oral, Nightly  tiotropium, 2 puff, inhalation, Daily      Continuous medications  lactated Ringer's, 100 mL/hr, Last Rate: 100 mL/hr (10/01/23 0231)      PRN medications  PRN medications: acetaminophen, albuterol, ALPRAZolam, hydrALAZINE, ipratropium-albuteroL       Relevant Results    Results for orders placed or performed during the hospital encounter of 09/30/23 (from the past 96 hour(s))   CBC and Auto Differential   Result Value Ref Range    WBC 10.8 4.4 - 11.3 x10*3/uL    nRBC 0.0 0.0 - 0.0 /100 WBCs    RBC 3.53 (L) 4.00 - 5.20 x10*6/uL    Hemoglobin 11.6 (L) 12.0 - 16.0 g/dL    Hematocrit 33.4 (L) 36.0 - 46.0 %    MCV 95 80 - 100 fL    MCH 32.9 26.0 - 34.0 pg    MCHC 34.7 32.0 - 36.0 g/dL    RDW 13.5 11.5 - 14.5 %    Platelets 314 150 - 450 x10*3/uL    MPV 9.2 7.5 - 11.5 fL    Neutrophils % 68.2 40.0 -  80.0 %    Immature Granulocytes %, Automated 0.7 0.0 - 0.9 %    Lymphocytes % 19.4 13.0 - 44.0 %    Monocytes % 10.4 2.0 - 10.0 %    Eosinophils % 0.9 0.0 - 6.0 %    Basophils % 0.4 0.0 - 2.0 %    Neutrophils Absolute 7.33 1.20 - 7.70 x10*3/uL    Immature Granulocytes Absolute, Automated 0.07 0.00 - 0.70 x10*3/uL    Lymphocytes Absolute 2.09 1.20 - 4.80 x10*3/uL    Monocytes Absolute 1.12 (H) 0.10 - 1.00 x10*3/uL    Eosinophils Absolute 0.10 0.00 - 0.70 x10*3/uL    Basophils Absolute 0.04 0.00 - 0.10 x10*3/uL   Comprehensive Metabolic Panel   Result Value Ref Range    Glucose 109 (H) 74 - 99 mg/dL    Sodium 142 136 - 145 mmol/L    Potassium 3.5 3.5 - 5.3 mmol/L    Chloride 104 98 - 107 mmol/L    Bicarbonate 26 21 - 32 mmol/L    Anion Gap 16 10 - 20 mmol/L    Urea Nitrogen 13 6 - 23 mg/dL    Creatinine 0.73 0.50 - 1.05 mg/dL    eGFR >90 >60 mL/min/1.73m*2    Calcium 8.9 8.6 - 10.3 mg/dL    Albumin 3.9 3.4 - 5.0 g/dL    Alkaline Phosphatase 95 33 - 110 U/L    Total Protein 7.5 6.4 - 8.2 g/dL    AST 17 9 - 39 U/L    Bilirubin, Total 0.4 0.0 - 1.2 mg/dL    ALT 17 7 - 45 U/L   Magnesium   Result Value Ref Range    Magnesium 1.64 1.60 - 2.40 mg/dL   Lactate   Result Value Ref Range    Lactate 0.9 0.4 - 2.0 mmol/L   Protime-INR   Result Value Ref Range    Protime 13.0 (H) 9.8 - 12.8 seconds    INR 1.2 (H) 0.9 - 1.1   B-Type Natriuretic Peptide   Result Value Ref Range     (H) 0 - 99 pg/mL   Troponin I, High Sensitivity, Initial   Result Value Ref Range    Troponin I, High Sensitivity 5 0 - 13 ng/L   Beta Hydroxybutyrate   Result Value Ref Range    Beta-Hydroxybutyrate 0.19 0.02 - 0.27 mmol/L   Sars-CoV-2 RT PCR, Symptomatic   Result Value Ref Range    Coronavirus 2019, PCR Not Detected Not Detected   Urinalysis with Reflex Microscopic   Result Value Ref Range    Color, Urine Yellow Straw, Yellow    Appearance, Urine Hazy (N) Clear    Specific Gravity, Urine 1.016 1.005 - 1.035    pH, Urine 7.0 5.0, 5.5, 6.0, 6.5,  7.0, 7.5, 8.0    Protein, Urine 100 (2+) (N) NEGATIVE mg/dL    Glucose, Urine NEGATIVE NEGATIVE mg/dL    Blood, Urine NEGATIVE NEGATIVE    Ketones, Urine NEGATIVE NEGATIVE mg/dL    Bilirubin, Urine NEGATIVE NEGATIVE    Urobilinogen, Urine <2.0 <2.0 mg/dL    Nitrite, Urine NEGATIVE NEGATIVE    Leukocyte Esterase, Urine LARGE (3+) (A) NEGATIVE   Urinalysis Microscopic Only   Result Value Ref Range    WBC, Urine >50 (A) 1-5, NONE /HPF    RBC, Urine >20 (A) NONE, 1-2, 3-5 /HPF    Squamous Epithelial Cells, Urine 26-50 (1+) Reference range not established. /HPF   Troponin I, High Sensitivity   Result Value Ref Range    Troponin I, High Sensitivity 6 0 - 13 ng/L   TSH with reflex to Free T4 if abnormal   Result Value Ref Range    Thyroid Stimulating Hormone 0.72 0.44 - 3.98 mIU/L   Troponin, High Sensitivity, 1 Hour   Result Value Ref Range    Troponin I, High Sensitivity 6 0 - 13 ng/L   TSH with reflex to Free T4 if abnormal   Result Value Ref Range    Thyroid Stimulating Hormone 0.67 0.44 - 3.98 mIU/L   Magnesium   Result Value Ref Range    Magnesium 1.50 (L) 1.60 - 2.40 mg/dL   CBC   Result Value Ref Range    WBC 10.6 4.4 - 11.3 x10*3/uL    nRBC 0.0 0.0 - 0.0 /100 WBCs    RBC 3.36 (L) 4.00 - 5.20 x10*6/uL    Hemoglobin 10.6 (L) 12.0 - 16.0 g/dL    Hematocrit 32.2 (L) 36.0 - 46.0 %    MCV 96 80 - 100 fL    MCH 31.5 26.0 - 34.0 pg    MCHC 32.9 32.0 - 36.0 g/dL    RDW 13.4 11.5 - 14.5 %    Platelets 287 150 - 450 x10*3/uL    MPV 9.3 7.5 - 11.5 fL   Comprehensive metabolic panel   Result Value Ref Range    Glucose 132 (H) 74 - 99 mg/dL    Sodium 141 136 - 145 mmol/L    Potassium 3.2 (L) 3.5 - 5.3 mmol/L    Chloride 104 98 - 107 mmol/L    Bicarbonate 29 21 - 32 mmol/L    Anion Gap 11 10 - 20 mmol/L    Urea Nitrogen 14 6 - 23 mg/dL    Creatinine 0.84 0.50 - 1.05 mg/dL    eGFR 80 >60 mL/min/1.73m*2    Calcium 8.7 8.6 - 10.3 mg/dL    Albumin 3.6 3.4 - 5.0 g/dL    Alkaline Phosphatase 79 33 - 110 U/L    Total Protein 6.7 6.4 -  8.2 g/dL    AST 15 9 - 39 U/L    Bilirubin, Total 0.3 0.0 - 1.2 mg/dL    ALT 16 7 - 45 U/L      Assessment/Plan    Principal Problem:    Urinary tract infection  Patient did have mild fever, had dysuria before admission.  She denies CVA tenderness.  There is no leukocytosis and lactate was within normal range.  Plan:  -Continue with Rocephin.  -Follow urine culture.  -Tylenol for fever control.  -Gentle IV fluid hydration overnight.  -If the patient continues to spike fever then will obtain CT scan of the abdomen to rule out any ascending infection.     Dyspnea on exertion:  -Reported dyspnea on exertion lately.  She had episode of diaphoresis. She has history of COPD and active tobacco smoker.  -She mentioned that her cardiologist wanted stress test performed because she had some calcification in her arteries.  -We will obtain echocardiogram.  -We will request cardiology consultation for further evaluation, possible stress test before discharge.     COPD:  -No significant changes in her cough.    -We will use DuoNeb as needed for bronchodilation.  -Spiriva.  -Symbicort     Tobacco smoking:  -1 pack/day.  -We will use nicotine patch, and as needed in the hospital.  -Counseling provided about smoking cessation.  Patient mentions she is trying to quit but not able to     Hypertension:  -Blood pressure was elevated in the ER required labetalol.  -We will resume home medication including losartan, hydrochlorothiazide.     Restless leg syndrome:  -Continue ropinirole     Anxiety and depression:  -Resume Seroquel and Xanax as needed     Generalized weakness   -Likely in the setting of UTI.  - PT and OT evaluation  - TCC for discharge planning     CODE STATUS: Full code  DVT prophylaxis: Lovenox  GI prophylaxis: Pantoprazole    Principal Problem:    Urinary tract infection  Active Problems:    Acute cystitis without hematuria       Plan of care was discussed extensively with patient. Patient verbalized understanding  through teach back method. All questions and concerns addressed upon examination.     Of note, this documentation is completed using the Dragon Dictation system (voice recognition software). There may be spelling and/or grammatical errors that were not corrected prior to final submission

## 2023-10-02 ENCOUNTER — APPOINTMENT (OUTPATIENT)
Dept: CARDIOLOGY | Facility: HOSPITAL | Age: 58
End: 2023-10-02
Payer: MEDICARE

## 2023-10-02 LAB
ANION GAP SERPL CALC-SCNC: 9 MMOL/L (ref 10–20)
ATRIAL RATE: 62 BPM
ATRIAL RATE: 73 BPM
BUN SERPL-MCNC: 12 MG/DL (ref 6–23)
CALCIUM SERPL-MCNC: 8.7 MG/DL (ref 8.6–10.3)
CHLORIDE SERPL-SCNC: 106 MMOL/L (ref 98–107)
CO2 SERPL-SCNC: 31 MMOL/L (ref 21–32)
CREAT SERPL-MCNC: 0.84 MG/DL (ref 0.5–1.05)
EJECTION FRACTION APICAL 4 CHAMBER: 65.6
ERYTHROCYTE [DISTWIDTH] IN BLOOD BY AUTOMATED COUNT: 13.4 % (ref 11.5–14.5)
GFR SERPL CREATININE-BSD FRML MDRD: 80 ML/MIN/1.73M*2
GLUCOSE BLD MANUAL STRIP-MCNC: 114 MG/DL (ref 74–99)
GLUCOSE BLD MANUAL STRIP-MCNC: 169 MG/DL (ref 74–99)
GLUCOSE SERPL-MCNC: 127 MG/DL (ref 74–99)
HCT VFR BLD AUTO: 33.5 % (ref 36–46)
HGB BLD-MCNC: 10.8 G/DL (ref 12–16)
MCH RBC QN AUTO: 31.4 PG (ref 26–34)
MCHC RBC AUTO-ENTMCNC: 32.2 G/DL (ref 32–36)
MCV RBC AUTO: 97 FL (ref 80–100)
NRBC BLD-RTO: 0 /100 WBCS (ref 0–0)
P AXIS: 48 DEGREES
P AXIS: 58 DEGREES
P OFFSET: 203 MS
P OFFSET: 208 MS
P ONSET: 147 MS
P ONSET: 149 MS
PLATELET # BLD AUTO: 291 X10*3/UL (ref 150–450)
PMV BLD AUTO: 9.1 FL (ref 7.5–11.5)
POTASSIUM SERPL-SCNC: 4.1 MMOL/L (ref 3.5–5.3)
PR INTERVAL: 142 MS
PR INTERVAL: 152 MS
Q ONSET: 220 MS
Q ONSET: 223 MS
QRS COUNT: 10 BEATS
QRS COUNT: 12 BEATS
QRS DURATION: 86 MS
QRS DURATION: 92 MS
QT INTERVAL: 446 MS
QT INTERVAL: 478 MS
QTC CALCULATION(BAZETT): 452 MS
QTC CALCULATION(BAZETT): 526 MS
QTC FREDERICIA: 451 MS
QTC FREDERICIA: 510 MS
R AXIS: 61 DEGREES
R AXIS: 67 DEGREES
RBC # BLD AUTO: 3.44 X10*6/UL (ref 4–5.2)
SODIUM SERPL-SCNC: 142 MMOL/L (ref 136–145)
T AXIS: 63 DEGREES
T AXIS: 68 DEGREES
T OFFSET: 443 MS
T OFFSET: 462 MS
VENTRICULAR RATE: 62 BPM
VENTRICULAR RATE: 73 BPM
WBC # BLD AUTO: 8.6 X10*3/UL (ref 4.4–11.3)

## 2023-10-02 PROCEDURE — 93306 TTE W/DOPPLER COMPLETE: CPT | Performed by: INTERNAL MEDICINE

## 2023-10-02 PROCEDURE — 2500000004 HC RX 250 GENERAL PHARMACY W/ HCPCS (ALT 636 FOR OP/ED): Performed by: INTERNAL MEDICINE

## 2023-10-02 PROCEDURE — 93010 ELECTROCARDIOGRAM REPORT: CPT | Performed by: INTERNAL MEDICINE

## 2023-10-02 PROCEDURE — 82947 ASSAY GLUCOSE BLOOD QUANT: CPT | Mod: 91

## 2023-10-02 PROCEDURE — 36415 COLL VENOUS BLD VENIPUNCTURE: CPT | Performed by: NURSE PRACTITIONER

## 2023-10-02 PROCEDURE — 2580000001 HC RX 258 IV SOLUTIONS: Performed by: INTERNAL MEDICINE

## 2023-10-02 PROCEDURE — 80048 BASIC METABOLIC PNL TOTAL CA: CPT | Performed by: NURSE PRACTITIONER

## 2023-10-02 PROCEDURE — 78452 HT MUSCLE IMAGE SPECT MULT: CPT | Performed by: RADIOLOGY

## 2023-10-02 PROCEDURE — 2500000001 HC RX 250 WO HCPCS SELF ADMINISTERED DRUGS (ALT 637 FOR MEDICARE OP): Performed by: INTERNAL MEDICINE

## 2023-10-02 PROCEDURE — G0378 HOSPITAL OBSERVATION PER HR: HCPCS

## 2023-10-02 PROCEDURE — 2500000002 HC RX 250 W HCPCS SELF ADMINISTERED DRUGS (ALT 637 FOR MEDICARE OP, ALT 636 FOR OP/ED): Mod: MUE | Performed by: INTERNAL MEDICINE

## 2023-10-02 PROCEDURE — 99231 SBSQ HOSP IP/OBS SF/LOW 25: CPT | Performed by: INTERNAL MEDICINE

## 2023-10-02 PROCEDURE — 93005 ELECTROCARDIOGRAM TRACING: CPT

## 2023-10-02 PROCEDURE — 93306 TTE W/DOPPLER COMPLETE: CPT

## 2023-10-02 PROCEDURE — 85027 COMPLETE CBC AUTOMATED: CPT | Performed by: NURSE PRACTITIONER

## 2023-10-02 PROCEDURE — 2500000002 HC RX 250 W HCPCS SELF ADMINISTERED DRUGS (ALT 637 FOR MEDICARE OP, ALT 636 FOR OP/ED)

## 2023-10-02 RX ORDER — DEXTROSE MONOHYDRATE 100 MG/ML
0.3 INJECTION, SOLUTION INTRAVENOUS ONCE AS NEEDED
Status: DISCONTINUED | OUTPATIENT
Start: 2023-10-02 | End: 2023-10-03 | Stop reason: HOSPADM

## 2023-10-02 RX ORDER — DEXTROSE 50 % IN WATER (D50W) INTRAVENOUS SYRINGE
25
Status: DISCONTINUED | OUTPATIENT
Start: 2023-10-02 | End: 2023-10-03 | Stop reason: HOSPADM

## 2023-10-02 RX ORDER — INSULIN LISPRO 100 [IU]/ML
0-10 INJECTION, SOLUTION INTRAVENOUS; SUBCUTANEOUS
Status: DISCONTINUED | OUTPATIENT
Start: 2023-10-02 | End: 2023-10-03 | Stop reason: HOSPADM

## 2023-10-02 RX ADMIN — Medication 50 MCG: at 21:04

## 2023-10-02 RX ADMIN — HYDRALAZINE HYDROCHLORIDE 50 MG: 50 TABLET ORAL at 00:41

## 2023-10-02 RX ADMIN — FLUTICASONE FUROATE AND VILANTEROL TRIFENATATE 1 PUFF: 200; 25 POWDER RESPIRATORY (INHALATION) at 09:41

## 2023-10-02 RX ADMIN — HYDRALAZINE HYDROCHLORIDE 50 MG: 50 TABLET ORAL at 14:15

## 2023-10-02 RX ADMIN — CEFTRIAXONE SODIUM 1 G: 1 INJECTION, SOLUTION INTRAVENOUS at 21:00

## 2023-10-02 RX ADMIN — ALPRAZOLAM 0.25 MG: 0.25 TABLET ORAL at 16:42

## 2023-10-02 RX ADMIN — PANTOPRAZOLE SODIUM 40 MG: 40 TABLET, DELAYED RELEASE ORAL at 06:31

## 2023-10-02 RX ADMIN — ACETAMINOPHEN 650 MG: 325 TABLET ORAL at 23:38

## 2023-10-02 RX ADMIN — BUPRENORPHINE AND NALOXONE 1 TABLET: 8; 2 TABLET SUBLINGUAL at 09:41

## 2023-10-02 RX ADMIN — ROPINIROLE 2 MG: 1 TABLET, FILM COATED ORAL at 21:04

## 2023-10-02 RX ADMIN — LOSARTAN POTASSIUM 100 MG: 100 TABLET, FILM COATED ORAL at 09:41

## 2023-10-02 RX ADMIN — CYANOCOBALAMIN TAB 500 MCG 1000 MCG: 500 TAB at 09:41

## 2023-10-02 RX ADMIN — BUPRENORPHINE AND NALOXONE 1 TABLET: 8; 2 TABLET SUBLINGUAL at 21:04

## 2023-10-02 RX ADMIN — TIOTROPIUM BROMIDE INHALATION SPRAY 2 PUFF: 3.12 SPRAY, METERED RESPIRATORY (INHALATION) at 09:40

## 2023-10-02 RX ADMIN — ATORVASTATIN CALCIUM 80 MG: 80 TABLET, FILM COATED ORAL at 09:40

## 2023-10-02 RX ADMIN — HYDRALAZINE HYDROCHLORIDE 10 MG: 20 INJECTION INTRAMUSCULAR; INTRAVENOUS at 15:50

## 2023-10-02 RX ADMIN — INSULIN LISPRO 2 UNITS: 100 INJECTION, SOLUTION INTRAVENOUS; SUBCUTANEOUS at 21:10

## 2023-10-02 RX ADMIN — SODIUM CHLORIDE, POTASSIUM CHLORIDE, SODIUM LACTATE AND CALCIUM CHLORIDE 100 ML/HR: 600; 310; 30; 20 INJECTION, SOLUTION INTRAVENOUS at 00:40

## 2023-10-02 RX ADMIN — GABAPENTIN 400 MG: 400 CAPSULE ORAL at 06:31

## 2023-10-02 RX ADMIN — HYDROCHLOROTHIAZIDE 12.5 MG: 25 TABLET ORAL at 09:40

## 2023-10-02 RX ADMIN — HYDRALAZINE HYDROCHLORIDE 50 MG: 50 TABLET ORAL at 23:43

## 2023-10-02 RX ADMIN — ENOXAPARIN SODIUM 40 MG: 40 INJECTION SUBCUTANEOUS at 09:40

## 2023-10-02 RX ADMIN — GABAPENTIN 400 MG: 400 CAPSULE ORAL at 14:15

## 2023-10-02 RX ADMIN — ASPIRIN 81 MG: 81 TABLET, COATED ORAL at 09:40

## 2023-10-02 RX ADMIN — GABAPENTIN 400 MG: 400 CAPSULE ORAL at 23:42

## 2023-10-02 ASSESSMENT — COGNITIVE AND FUNCTIONAL STATUS - GENERAL
DAILY ACTIVITIY SCORE: 24
MOBILITY SCORE: 23
CLIMB 3 TO 5 STEPS WITH RAILING: A LITTLE

## 2023-10-02 ASSESSMENT — PAIN SCALES - GENERAL: PAINLEVEL_OUTOF10: 4

## 2023-10-02 NOTE — PROGRESS NOTES
Rachael Ruiz is a 58 y.o. female on day 0 of admission presenting with Urinary tract infection.    Subjective   Pt doing well, uneventful night.  Denies chest pain or SOB today.  Pt has non-productive cough, current smoker. Does have a nicotine patch in place. Troponin x3 negative. Pt sitting on side of the bed. Scheduled for a stress test this am.  Discussed the risks and benefits of quitting smoking.  Pt states she is ready to quit.  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        Objective   Physical Exam     Physical Exam  Constitutional:       Appearance: She is obese.   Eyes:      Pupils: Pupils are equal, round, and reactive to light.   Cardiovascular:      Rate and Rhythm: Normal rate and regular rhythm.   Pulmonary:      Breath sounds: Wheezing present.   Musculoskeletal:         General: Normal range of motion.   Neurological:      Mental Status: She is alert and oriented to person, place, and time.   Psychiatric:         Mood and Affect: Mood normal.         Last Recorded Vitals  Blood pressure (!) 199/98, pulse 72, temperature 36.4 °C (97.5 °F), resp. rate 16, height 1.524 m (5'), weight 75.2 kg (165 lb 12.6 oz), SpO2 92 %.  Intake/Output last 3 Shifts:  I/O last 3 completed shifts:  In: 2470 (32.8 mL/kg) [I.V.:2420 (32.2 mL/kg); IV Piggyback:50]  Out: - (0 mL/kg)   Weight: 75.2 kg     Relevant Results  Scheduled medications  aspirin, 81 mg, oral, Daily  atorvastatin, 80 mg, oral, Daily  buprenorphine-naloxone, 1 tablet, sublingual, BID  cefTRIAXone, 1 g, intravenous, q24h  cholecalciferol, 50 mcg, oral, Nightly  cyanocobalamin, 1,000 mcg, oral, Daily  enoxaparin, 40 mg, subcutaneous, Daily  fluticasone furoate-vilanteroL, 1 puff, inhalation, Daily  gabapentin, 400 mg, oral, q8h LES  hydrALAZINE, 50 mg, oral, q12h  hydroCHLOROthiazide, 12.5 mg, oral, Daily  insulin lispro, 0-10 Units, subcutaneous, Before meals & nightly  losartan, 100 mg, oral, Daily  metFORMIN, 1,000 mg, oral,  BID with meals  nicotine, 1 patch, transdermal, Daily  pantoprazole, 40 mg, oral, Daily before breakfast  polyethylene glycol, 17 g, oral, Daily  QUEtiapine, 50 mg, oral, Nightly  rOPINIRole, 2 mg, oral, Nightly  tiotropium, 2 puff, inhalation, Daily      Continuous medications  lactated Ringer's, 100 mL/hr, Last Rate: 100 mL/hr (10/02/23 0040)      PRN medications  PRN medications: acetaminophen, albuterol, ALPRAZolam, dextrose, dextrose, glucagon, hydrALAZINE, ipratropium-albuteroL    Results for orders placed or performed during the hospital encounter of 09/30/23 (from the past 24 hour(s))   CBC   Result Value Ref Range    WBC 8.6 4.4 - 11.3 x10*3/uL    nRBC 0.0 0.0 - 0.0 /100 WBCs    RBC 3.44 (L) 4.00 - 5.20 x10*6/uL    Hemoglobin 10.8 (L) 12.0 - 16.0 g/dL    Hematocrit 33.5 (L) 36.0 - 46.0 %    MCV 97 80 - 100 fL    MCH 31.4 26.0 - 34.0 pg    MCHC 32.2 32.0 - 36.0 g/dL    RDW 13.4 11.5 - 14.5 %    Platelets 291 150 - 450 x10*3/uL    MPV 9.1 7.5 - 11.5 fL   Basic metabolic panel   Result Value Ref Range    Glucose 127 (H) 74 - 99 mg/dL    Sodium 142 136 - 145 mmol/L    Potassium 4.1 3.5 - 5.3 mmol/L    Chloride 106 98 - 107 mmol/L    Bicarbonate 31 21 - 32 mmol/L    Anion Gap 9 (L) 10 - 20 mmol/L    Urea Nitrogen 12 6 - 23 mg/dL    Creatinine 0.84 0.50 - 1.05 mg/dL    eGFR 80 >60 mL/min/1.73m*2    Calcium 8.7 8.6 - 10.3 mg/dL   Transthoracic Echo (TTE) Complete   Result Value Ref Range    BSA 1.78 m2   ECG 12 Lead   Result Value Ref Range    Ventricular Rate 62 BPM    Atrial Rate 62 BPM    [place] Interval 142 ms    QRS Duration 86 ms    QT Interval 446 ms    QTC Calculation(Bazett) 452 ms    P Axis 48 degrees    R Axis 61 degrees    T Axis 63 degrees    QRS Count 10 beats    Q Onset 220 ms    P Onset 149 ms    P Offset 208 ms    T Offset 443 ms    QTC Fredericia 451 ms                        Assessment/Plan   Principal Problem:    Urinary tract infection  Active Problems:    Acute cystitis without  hematuria  -continue rocephin IV  -Pending cultures    Chest pain  -Cardiology consulted, Stress test scheduled for today  -Continue ASA, statin  -Daily labs    Acute on Chronic COPD  -Continue Breo  and Spiriva daily  -As needed albuterol tx    Tobacco use  -Smoking  cessation  -Nicotine patch    HTN/HLD  -Resume home meds    Obesity  -Lifestyle changes  -Diet modification    T2DM  -Accuchecks ac and hs  -Hold oral agents  -Diabetic diet    DVTp- Lovenox subcutaneous    PLAN: Home         I spent 35 minutes in the professional and overall care of this patient.      Elsie Maya, APRN-CNP       No

## 2023-10-02 NOTE — PROGRESS NOTES
[facility] Progress Note               Rounding Cardiologist:  Alexey Neumann MD, MD   Primary Cardiologist: Dr. Ryan Kirkpatrick    Date:  10/2/2023  Patient:  Rachael Ruiz  YOB: 1965  MRN:  [id number]   Admit Date:  9/30/2023      SUBJECTIVE    Rachael Ruiz was seen and examined today at bedside. She denies any chest pain or shortness of breath.     Her overnight history is negative.      VITALS     Vitals:    10/01/23 1936 10/01/23 2300 10/02/23 0825 10/02/23 1207   BP: 169/73 167/76 (!) 199/98 (!) 176/92   BP Location:  Right arm     Patient Position:       Pulse: 70 72  70   Resp:  18 16 16   Temp: 36.9 °C (98.4 °F) 36.1 °C (97 °F) 36.4 °C (97.5 °F) 35.9 °C (96.6 °F)   TempSrc:  Temporal     SpO2: 94% 95% 92% 93%   Weight:       Height:           Intake/Output Summary (Last 24 hours) at 10/2/2023 1234  Last data filed at 10/2/2023 0558  Gross per 24 hour   Intake 2470 ml   Output --   Net 2470 ml       [unfilled]    PHYSICAL EXAM   PHYSICAL EXAMINATION:  GENERAL:  Well developed, well nourished, in no acute distress.  CHEST:  Symmetric and nontender.  NEURO/PSYCH:  Alert and oriented times three with approppriate behavior and responses.  NECK:  Supple, no JVD, no bruit.  LUNGS:  Clear to auscultation bilaterally, normal respiratory effort.  HEART:  Rate and rhythm regular with no evident murmur, no gallop appreciated.        There are no rubs, clicks or heaves.  EXTREMITIES:  Warm with good color, no clubbing or cyanosis.  There is no edema noted.  PERIPHERAL VASCULAR:  Pulses present and equally palpable; 2+ throughout.      DIAGNOSTIC RESULTS     Encounter Date: 09/30/23   ECG 12 Lead   Result Value    Ventricular Rate 62    Atrial Rate 62    [place] Interval 142    QRS Duration 86    QT Interval 446    QTC Calculation(Bazett) 452    P Axis 48    R Axis 61    T Axis 63    QRS Count 10    Q Onset 220    P Onset 149    P Offset 208    T Offset 443    QTC Fredericia 451    Narrative    Sinus  rhythm with occasional Premature ventricular complexes  Otherwise normal ECG  When compared with ECG of 01-OCT-2023 08:57, (unconfirmed)  Premature ventricular complexes are now Present  QT has shortened  Confirmed by Neha Joe ([id number]) on 10/2/2023 8:58:28 AM         Telemetry:  NSR .      LAB DATA     CBC:   Lab Results   Component Value Date    WBC 8.6 10/02/2023    RBC 3.44 (L) 10/02/2023    HGB 10.8 (L) 10/02/2023    HCT 33.5 (L) 10/02/2023     10/02/2023     CMP:    Lab Results   Component Value Date     10/02/2023    K 4.1 10/02/2023     10/02/2023    CO2 31 10/02/2023    BUN 12 10/02/2023    CREATININE 0.84 10/02/2023    GLUCOSE 127 (H) 10/02/2023    CALCIUM 8.7 10/02/2023     Hepatic Function Panel:    Lab Results   Component Value Date    ALKPHOS 79 10/01/2023    ALT 16 10/01/2023    AST 15 10/01/2023    PROT 6.7 10/01/2023    BILITOT 0.3 10/01/2023     Magnesium:    Lab Results   Component Value Date    MG 1.50 (L) 10/01/2023     PT/INR:    Lab Results   Component Value Date    PROTIME 13.0 (H) 09/30/2023    INR 1.2 (H) 09/30/2023     Lipid Profile:    Lab Results   Component Value Date    TRIG 177 (H) 10/01/2023    HDL 22.2 10/01/2023    LDLCALC 43 (L) 10/01/2023     TSH:    Lab Results   Component Value Date    TSH 0.67 10/01/2023       RADIOLOGY     Transthoracic Echo (TTE) Complete         XR chest 1 view   Final Result   1.  No evidence of acute cardiopulmonary process.                  MACRO:   None        Signed by: Toni Sotelo 9/30/2023 5:08 PM   Dictation workstation:   [id number]      Nuclear Stress Test    (Results Pending)       CURRENT MEDICATIONS    aspirin, 81 mg, oral, Daily  atorvastatin, 80 mg, oral, Daily  buprenorphine-naloxone, 1 tablet, sublingual, BID  cefTRIAXone, 1 g, intravenous, q24h  cholecalciferol, 50 mcg, oral, Nightly  cyanocobalamin, 1,000 mcg, oral, Daily  enoxaparin, 40 mg, subcutaneous, Daily  fluticasone furoate-vilanteroL, 1 puff, inhalation,  Daily  gabapentin, 400 mg, oral, q8h LES  hydrALAZINE, 50 mg, oral, q12h  hydroCHLOROthiazide, 12.5 mg, oral, Daily  insulin lispro, 0-10 Units, subcutaneous, Before meals & nightly  losartan, 100 mg, oral, Daily  [Held by provider] metFORMIN, 1,000 mg, oral, BID with meals  nicotine, 1 patch, transdermal, Daily  pantoprazole, 40 mg, oral, Daily before breakfast  polyethylene glycol, 17 g, oral, Daily  QUEtiapine, 50 mg, oral, Nightly  rOPINIRole, 2 mg, oral, Nightly  tiotropium, 2 puff, inhalation, Daily      lactated Ringer's, 100 mL/hr, Last Rate: 100 mL/hr (10/02/23 0040)          ASSESSMENT   Principal Problem:    Urinary tract infection  Active Problems:    Acute cystitis without hematuria   Elevated coronary calcium score    Patient Active Problem List   Diagnosis    1st MTP arthritis    Abnormal mammogram    Anxiety and depression    Bipolar affective disorder (CMS/HCC)    Chronic constipation    Chronic low back pain with sciatica    Type 2 diabetes mellitus (CMS/[facility])    COPD (chronic obstructive pulmonary disease) (CMS/[facility])    Coronary artery calcification seen on CT scan    Essential hypertension, benign    Forgetfulness    Gait disturbance    Ganglion cyst of wrist    Hematuria    HSV-2 infection    Hyperlipidemia    Insomnia    LVH (left ventricular hypertrophy)    Mass of joint of right wrist    Peripheral neuropathy    Plantar fasciitis    Restless legs syndrome    B12 deficiency    Arthralgia of right knee    CAD (coronary artery disease)    Lumbar spinal stenosis    Lymphocytosis    Neurogenic claudication    PAD (peripheral artery disease) (CMS/HCC)    Compression fracture of thoracic vertebra (CMS/[facility])    Sacroiliitis, not elsewhere classified (CMS/[facility])    Opioid dependence, in remission (CMS/[facility])    Urinary tract infection    Acute cystitis without hematuria       PLAN     Monitor on telemetry.  IV fluids and antibiotics.   Scheduled for myocardial perfusion study tomorrow.  Follow-up with   Amber as scheduled.     Please do not hesitate to call with questions.  Electronically signed by Alexey Neumann MD, FACC on 10/2/2023 at 12:34 PM

## 2023-10-03 ENCOUNTER — APPOINTMENT (OUTPATIENT)
Dept: CARDIOLOGY | Facility: HOSPITAL | Age: 58
End: 2023-10-03
Payer: MEDICARE

## 2023-10-03 ENCOUNTER — APPOINTMENT (OUTPATIENT)
Dept: RADIOLOGY | Facility: HOSPITAL | Age: 58
End: 2023-10-03
Payer: MEDICARE

## 2023-10-03 VITALS
BODY MASS INDEX: 32.55 KG/M2 | SYSTOLIC BLOOD PRESSURE: 184 MMHG | HEART RATE: 74 BPM | RESPIRATION RATE: 18 BRPM | OXYGEN SATURATION: 96 % | DIASTOLIC BLOOD PRESSURE: 97 MMHG | WEIGHT: 165.79 LBS | HEIGHT: 60 IN | TEMPERATURE: 95.7 F

## 2023-10-03 LAB
ANION GAP SERPL CALC-SCNC: 12 MMOL/L (ref 10–20)
ATRIAL RATE: 51 BPM
BUN SERPL-MCNC: 10 MG/DL (ref 6–23)
CALCIUM SERPL-MCNC: 8.9 MG/DL (ref 8.6–10.3)
CHLORIDE SERPL-SCNC: 105 MMOL/L (ref 98–107)
CO2 SERPL-SCNC: 27 MMOL/L (ref 21–32)
CREAT SERPL-MCNC: 0.74 MG/DL (ref 0.5–1.05)
ERYTHROCYTE [DISTWIDTH] IN BLOOD BY AUTOMATED COUNT: 13.3 % (ref 11.5–14.5)
GFR SERPL CREATININE-BSD FRML MDRD: >90 ML/MIN/1.73M*2
GLUCOSE BLD MANUAL STRIP-MCNC: 127 MG/DL (ref 74–99)
GLUCOSE BLD MANUAL STRIP-MCNC: 144 MG/DL (ref 74–99)
GLUCOSE SERPL-MCNC: 125 MG/DL (ref 74–99)
HCT VFR BLD AUTO: 30.7 % (ref 36–46)
HGB BLD-MCNC: 10.4 G/DL (ref 12–16)
MCH RBC QN AUTO: 32.2 PG (ref 26–34)
MCHC RBC AUTO-ENTMCNC: 33.9 G/DL (ref 32–36)
MCV RBC AUTO: 95 FL (ref 80–100)
NRBC BLD-RTO: 0 /100 WBCS (ref 0–0)
P AXIS: 41 DEGREES
P OFFSET: 201 MS
P ONSET: 149 MS
PLATELET # BLD AUTO: 278 X10*3/UL (ref 150–450)
PMV BLD AUTO: 9 FL (ref 7.5–11.5)
POTASSIUM SERPL-SCNC: 3.4 MMOL/L (ref 3.5–5.3)
PR INTERVAL: 140 MS
Q ONSET: 219 MS
QRS COUNT: 8 BEATS
QRS DURATION: 84 MS
QT INTERVAL: 476 MS
QTC CALCULATION(BAZETT): 438 MS
QTC FREDERICIA: 451 MS
R AXIS: 51 DEGREES
RBC # BLD AUTO: 3.23 X10*6/UL (ref 4–5.2)
SODIUM SERPL-SCNC: 141 MMOL/L (ref 136–145)
T AXIS: 58 DEGREES
T OFFSET: 457 MS
VENTRICULAR RATE: 51 BPM
WBC # BLD AUTO: 8.5 X10*3/UL (ref 4.4–11.3)

## 2023-10-03 PROCEDURE — 3430000001 HC RX 343 DIAGNOSTIC RADIOPHARMACEUTICALS: Performed by: INTERNAL MEDICINE

## 2023-10-03 PROCEDURE — 2500000002 HC RX 250 W HCPCS SELF ADMINISTERED DRUGS (ALT 637 FOR MEDICARE OP, ALT 636 FOR OP/ED): Performed by: INTERNAL MEDICINE

## 2023-10-03 PROCEDURE — 96365 THER/PROPH/DIAG IV INF INIT: CPT

## 2023-10-03 PROCEDURE — 80048 BASIC METABOLIC PNL TOTAL CA: CPT | Performed by: NURSE PRACTITIONER

## 2023-10-03 PROCEDURE — 82947 ASSAY GLUCOSE BLOOD QUANT: CPT | Mod: 91

## 2023-10-03 PROCEDURE — 93016 CV STRESS TEST SUPVJ ONLY: CPT | Performed by: RADIOLOGY

## 2023-10-03 PROCEDURE — S4991 NICOTINE PATCH NONLEGEND: HCPCS | Performed by: INTERNAL MEDICINE

## 2023-10-03 PROCEDURE — 2500000001 HC RX 250 WO HCPCS SELF ADMINISTERED DRUGS (ALT 637 FOR MEDICARE OP): Performed by: INTERNAL MEDICINE

## 2023-10-03 PROCEDURE — 2500000004 HC RX 250 GENERAL PHARMACY W/ HCPCS (ALT 636 FOR OP/ED): Mod: MUE

## 2023-10-03 PROCEDURE — 93018 CV STRESS TEST I&R ONLY: CPT | Performed by: RADIOLOGY

## 2023-10-03 PROCEDURE — 85027 COMPLETE CBC AUTOMATED: CPT | Performed by: NURSE PRACTITIONER

## 2023-10-03 PROCEDURE — G0378 HOSPITAL OBSERVATION PER HR: HCPCS

## 2023-10-03 PROCEDURE — 93016 CV STRESS TEST SUPVJ ONLY: CPT | Performed by: INTERNAL MEDICINE

## 2023-10-03 PROCEDURE — 2500000004 HC RX 250 GENERAL PHARMACY W/ HCPCS (ALT 636 FOR OP/ED): Performed by: INTERNAL MEDICINE

## 2023-10-03 PROCEDURE — 93017 CV STRESS TEST TRACING ONLY: CPT

## 2023-10-03 PROCEDURE — 96375 TX/PRO/DX INJ NEW DRUG ADDON: CPT

## 2023-10-03 PROCEDURE — 93005 ELECTROCARDIOGRAM TRACING: CPT

## 2023-10-03 PROCEDURE — 99232 SBSQ HOSP IP/OBS MODERATE 35: CPT | Performed by: INTERNAL MEDICINE

## 2023-10-03 PROCEDURE — 82947 ASSAY GLUCOSE BLOOD QUANT: CPT

## 2023-10-03 PROCEDURE — A9502 TC99M TETROFOSMIN: HCPCS | Performed by: INTERNAL MEDICINE

## 2023-10-03 PROCEDURE — 78452 HT MUSCLE IMAGE SPECT MULT: CPT

## 2023-10-03 PROCEDURE — 2500000004 HC RX 250 GENERAL PHARMACY W/ HCPCS (ALT 636 FOR OP/ED)

## 2023-10-03 PROCEDURE — 2580000001 HC RX 258 IV SOLUTIONS: Performed by: INTERNAL MEDICINE

## 2023-10-03 PROCEDURE — 93010 ELECTROCARDIOGRAM REPORT: CPT | Performed by: INTERNAL MEDICINE

## 2023-10-03 PROCEDURE — 36415 COLL VENOUS BLD VENIPUNCTURE: CPT | Performed by: NURSE PRACTITIONER

## 2023-10-03 RX ORDER — CEPHALEXIN 500 MG/1
500 CAPSULE ORAL 2 TIMES DAILY
Qty: 20 CAPSULE | Refills: 0 | Status: SHIPPED | OUTPATIENT
Start: 2023-10-03 | End: 2023-10-13

## 2023-10-03 RX ORDER — FLUTICASONE FUROATE AND VILANTEROL 200; 25 UG/1; UG/1
1 POWDER RESPIRATORY (INHALATION) DAILY
Qty: 1 EACH | Refills: 0 | Status: SHIPPED | OUTPATIENT
Start: 2023-10-03 | End: 2023-12-01 | Stop reason: SDUPTHER

## 2023-10-03 RX ORDER — SPIRONOLACTONE 25 MG/1
25 TABLET ORAL DAILY
Status: DISCONTINUED | OUTPATIENT
Start: 2023-10-03 | End: 2023-10-03 | Stop reason: HOSPADM

## 2023-10-03 RX ORDER — HYDRALAZINE HYDROCHLORIDE 100 MG/1
100 TABLET, FILM COATED ORAL EVERY 12 HOURS
Qty: 180 TABLET | Refills: 3 | Status: SHIPPED | OUTPATIENT
Start: 2023-10-04 | End: 2024-02-13

## 2023-10-03 RX ORDER — HYDRALAZINE HYDROCHLORIDE 50 MG/1
100 TABLET, FILM COATED ORAL EVERY 12 HOURS
Status: DISCONTINUED | OUTPATIENT
Start: 2023-10-04 | End: 2023-10-03 | Stop reason: HOSPADM

## 2023-10-03 RX ORDER — SPIRONOLACTONE 25 MG/1
25 TABLET ORAL DAILY
Qty: 90 TABLET | Refills: 3 | Status: SHIPPED | OUTPATIENT
Start: 2023-10-03 | End: 2024-10-02

## 2023-10-03 RX ORDER — IBUPROFEN 200 MG
1 TABLET ORAL DAILY
Qty: 30 PATCH | Refills: 0 | Status: SHIPPED | OUTPATIENT
Start: 2023-10-04 | End: 2023-10-12

## 2023-10-03 RX ORDER — POTASSIUM CHLORIDE 20 MEQ/1
40 TABLET, EXTENDED RELEASE ORAL ONCE
Status: COMPLETED | OUTPATIENT
Start: 2023-10-03 | End: 2023-10-03

## 2023-10-03 RX ADMIN — GABAPENTIN 400 MG: 400 CAPSULE ORAL at 06:32

## 2023-10-03 RX ADMIN — POLYETHYLENE GLYCOL 3350 17 G: 17 POWDER, FOR SOLUTION ORAL at 11:19

## 2023-10-03 RX ADMIN — ENOXAPARIN SODIUM 40 MG: 40 INJECTION SUBCUTANEOUS at 11:20

## 2023-10-03 RX ADMIN — ATORVASTATIN CALCIUM 80 MG: 80 TABLET, FILM COATED ORAL at 11:17

## 2023-10-03 RX ADMIN — BUPRENORPHINE AND NALOXONE 1 TABLET: 8; 2 TABLET SUBLINGUAL at 11:18

## 2023-10-03 RX ADMIN — HYDROCHLOROTHIAZIDE 12.5 MG: 25 TABLET ORAL at 11:18

## 2023-10-03 RX ADMIN — CYANOCOBALAMIN TAB 500 MCG 1000 MCG: 500 TAB at 11:18

## 2023-10-03 RX ADMIN — POTASSIUM CHLORIDE 40 MEQ: 1500 TABLET, EXTENDED RELEASE ORAL at 11:17

## 2023-10-03 RX ADMIN — ASPIRIN 81 MG: 81 TABLET, COATED ORAL at 11:17

## 2023-10-03 RX ADMIN — TETROFOSMIN 10.7 MILLICURIE: 0.23 INJECTION, POWDER, LYOPHILIZED, FOR SOLUTION INTRAVENOUS at 07:45

## 2023-10-03 RX ADMIN — SODIUM CHLORIDE, POTASSIUM CHLORIDE, SODIUM LACTATE AND CALCIUM CHLORIDE 100 ML/HR: 600; 310; 30; 20 INJECTION, SOLUTION INTRAVENOUS at 02:14

## 2023-10-03 RX ADMIN — LOSARTAN POTASSIUM 100 MG: 100 TABLET, FILM COATED ORAL at 11:17

## 2023-10-03 RX ADMIN — PANTOPRAZOLE SODIUM 40 MG: 40 TABLET, DELAYED RELEASE ORAL at 06:32

## 2023-10-03 RX ADMIN — HYDRALAZINE HYDROCHLORIDE 50 MG: 50 TABLET ORAL at 12:37

## 2023-10-03 RX ADMIN — NICOTINE 1 PATCH: 14 PATCH, EXTENDED RELEASE TRANSDERMAL at 11:16

## 2023-10-03 RX ADMIN — GABAPENTIN 400 MG: 400 CAPSULE ORAL at 12:36

## 2023-10-03 NOTE — DISCHARGE SUMMARY
Discharge Diagnosis  Urinary tract infection    Issues Requiring Follow-Up  none    Discharge Meds     Your medication list        START taking these medications        Instructions Last Dose Given Next Dose Due   albuterol 2.5 mg /3 mL (0.083 %) nebulizer solution           albuterol 90 mcg/actuation inhaler      Inhale 1-2 puffs every 4 hours if needed for wheezing or shortness of breath. Every 4-6 hours if needed       cephalexin 500 mg capsule  Commonly known as: Keflex      Take 1 capsule (500 mg) by mouth 2 times a day for 10 days.       fluticasone furoate-vilanteroL 200-25 mcg/dose inhaler  Commonly known as: Breo Ellipta      Inhale 1 puff once daily.       hydrALAZINE 50 mg tablet  Commonly known as: Apresoline      Take 1 tablet (50 mg) by mouth every 12 hours.       nicotine 14 mg/24 hr patch  Commonly known as: Nicoderm CQ  Start taking on: October 4, 2023      Place 1 patch over 24 hours on the skin once daily. Do not start before October 4, 2023.              CONTINUE taking these medications        Instructions Last Dose Given Next Dose Due   ALPRAZolam 0.5 mg disintegrating tablet  Commonly known as: Niravam           aspirin 81 mg EC tablet           atorvastatin 80 mg tablet  Commonly known as: Lipitor      Take 1 tablet (80 mg) by mouth once daily.       buprenorphine-naloxone 8-2 mg SL tablet  Commonly known as: Suboxone           cholecalciferol 50 mcg (2,000 unit) capsule  Commonly known as: Vitamin D-3           cyanocobalamin 1,000 mcg tablet  Commonly known as: Vitamin B-12           FISH OIL ORAL           gabapentin 800 mg tablet  Commonly known as: Neurontin      Take 1 tablet (800 mg) by mouth 4 times a day.       hydroCHLOROthiazide 12.5 mg tablet  Commonly known as: HYDRODiuril      Take 1 tablet (12.5 mg) by mouth once daily.       lancets misc           losartan 100 mg tablet  Commonly known as: Cozaar      Take 1 tablet (100 mg) by mouth once daily.       metFORMIN 1,000 mg  tablet  Commonly known as: Glucophage      Take 1 tablet (1,000 mg) by mouth 2 times a day with meals.       nebulizer and compressor device      Use as directed.       OneTouch Ultra Test strip  Generic drug: blood sugar diagnostic      TEST 4 TIMES DAILY       QUEtiapine 50 mg tablet  Commonly known as: SEROquel           rOPINIRole 1 mg tablet  Commonly known as: Requip           Spiriva Respimat 2.5 mcg/actuation inhaler  Generic drug: tiotropium      INHALE 2 PUFFS ONCE DAILY       Trulicity 1.5 mg/0.5 mL pen injector injection  Generic drug: dulaglutide      Inject 1.5 mg under the skin 1 (one) time per week.              ASK your doctor about these medications        Instructions Last Dose Given Next Dose Due   lidocaine 5 % patch  Commonly known as: Lidoderm      APPLY 1 PATCH TO THE AFFECTED AREA AND LEAVE IN PLACE FOR 12 HOURS, THEN REMOVE AND LEAVE OFF FOR 12 HOURS.                 Where to Get Your Medications        These medications were sent to [facility] [address] [place], [place] [id number]      Phone: [phone]   cephalexin 500 mg capsule  fluticasone furoate-vilanteroL 200-25 mcg/dose inhaler  nicotine 14 mg/24 hr patch         Test Results Pending At Discharge  Pending Labs       Order Current Status    Extra Tubes In process    Extra Tubes In process    Extra Tubes In process    SST TOP In process    SST TOP In process    SST TOP In process            Hospital Course   Rachael Ruiz is a 58 y.o. female with Primary medical history of COPD, depression, DM, HTN, anxiety, and OA who presented with multiple medical complaints.  She reported dysuria for several days associated with on and off sweating episodes, back pain, and not feeling well in general.  She denied nausea or vomiting.  Patient also reported some shortness of breath, intermittent chest discomfort, and cough for few weeks.  She had history of lower back pain and surgery and thought she  might have infection in her back.  She was drinking plenty of water.  Apparently patient was following up with cardiologist Dr. Clark.  She mentioned that she needed a stress test but was scared from the contrast injection.  She is willing to do a stress test this time. UA was suggestive of UTI, EKG was NSR. CBC and BMP unremarkable.  and Trop negative. Pt admitted and treated for UTI with rocephin and gentle IVF. Pt did report dyspnea on exertion and is a current smoker. Pt has hx of substance abuse and has been clean for 8 years. While in the hospital pt was started on a nicotine patch and verbalizes she would like to quit smoking at this time. Echo showed LVEF 65% .  Cardiology was consulted and pt had a nuclear stress test shows normal myocardial perfusion w/o evidence of ischemia or prior infarction. Per cardiology, increase hydralazine to 100mg BID and add aldactone 25mg daily. Pt showed improvement during hospital stay and being discharged with inhalers, smoking cessation and anbx for UTI.  On day of discharge pt hemodynamically stable.    Pertinent Physical Exam At Time of Discharge  Physical Exam  Constitutional:       Appearance: She is obese.   Eyes:      Pupils: Pupils are equal, round, and reactive to light.   Cardiovascular:      Rate and Rhythm: Normal rate and regular rhythm.   Pulmonary:      Breath sounds: bilateral lung sounds clear t/o   Musculoskeletal:         General: Normal range of motion.   Neurological:      Mental Status: She is alert and oriented to person, place, and time.   Psychiatric:         Mood and Affect: Mood normal.     Outpatient Follow-Up  Future Appointments   Date Time Provider Department Center   10/25/2023  9:15 AM [facility] DXA 1 ELYDX [facility] [place] [facility]   10/25/2023 10:45 AM [facility] MAMMO 1 [facility]AM [facility] [place] [facility]   10/30/2023  1:00 PM DO García WalkerABPC1 Lazarus Maya, APRN-CNP    Total time spent on discharge planning was  48    minutes.    Plan of care was  discussed extensively with patient.  Patient verbalized understanding through teach back method.  All question and concerns addressed upon examination.    Of note, this documentation is completed using the Dragon Dictation system (voice recognition software). There may be spelling and/or grammatical errors that were not corrected prior to final submission.

## 2023-10-03 NOTE — PROGRESS NOTES
[facility] Progress Note               Rounding Cardiologist:  Alexey Neumann MD   Primary Cardiologist: Kain Clark MD  Date:  10/3/2023  Patient:  Rachael Ruiz  YOB: 1965  MRN:  [id number]   Admit Date:  9/30/2023      SUBJECTIVE    Rachael Ruiz was seen and examined today at bedside.    She denies any chest pain or shortness of breath.     Her overnight history is negative.    She completed the Lexiscan myocardial perfusion study.  No ischemia or MI.  Normal LVEF.     VITALS     Vitals:    10/02/23 1700 10/02/23 1942 10/03/23 0029 10/03/23 1229   BP: 161/79 166/90 179/81 (!) 189/88   Pulse: 73 72 62 67   Resp: 18 18 18    Temp:  36.9 °C (98.4 °F) 36.5 °C (97.7 °F) 36.5 °C (97.7 °F)   TempSrc:       SpO2:  98% 94% 94%   Weight:       Height:           Intake/Output Summary (Last 24 hours) at 10/3/2023 1457  Last data filed at 10/3/2023 0214  Gross per 24 hour   Intake 1580 ml   Output --   Net 1580 ml         PHYSICAL EXAM   PHYSICAL EXAMINATION:  GENERAL:  Well developed, well nourished, in no acute distress.  CHEST:  Symmetric and nontender.  NEURO/PSYCH:  Alert and oriented times three with approppriate behavior and responses.  NECK:  Supple, no JVD, no bruit.  LUNGS:  Clear to auscultation bilaterally, normal respiratory effort.  HEART:  Rate and rhythm regular with no evident murmur, no gallop appreciated.        There are no rubs, clicks or heaves.  EXTREMITIES:  Warm with good color, no clubbing or cyanosis.  There is no edema noted.  PERIPHERAL VASCULAR:  Pulses present and equally palpable; 2+ throughout.      DIAGNOSTIC RESULTS     Encounter Date: 09/30/23   ECG 12 Lead   Result Value    Ventricular Rate 62    Atrial Rate 62    VA Interval 142    QRS Duration 86    QT Interval 446    QTC Calculation(Bazett) 452    P Axis 48    R Axis 61    T Axis 63    QRS Count 10    Q Onset 220    P Onset 149    P Offset 208    T Offset 443    QTC Fredericia 451    Narrative    Sinus rhythm  with occasional Premature ventricular complexes  Otherwise normal ECG  When compared with ECG of 01-OCT-2023 08:57, (unconfirmed)  Premature ventricular complexes are now Present  QT has shortened  Confirmed by Neha Joe ([id number]) on 10/2/2023 8:58:28 AM         Telemetry:  NSR .      LAB DATA     CBC:   Lab Results   Component Value Date    WBC 8.5 10/03/2023    RBC 3.23 (L) 10/03/2023    HGB 10.4 (L) 10/03/2023    HCT 30.7 (L) 10/03/2023     10/03/2023     CMP:    Lab Results   Component Value Date     10/03/2023    K 3.4 (L) 10/03/2023     10/03/2023    CO2 27 10/03/2023    BUN 10 10/03/2023    CREATININE 0.74 10/03/2023    GLUCOSE 125 (H) 10/03/2023    CALCIUM 8.9 10/03/2023     Hepatic Function Panel:    Lab Results   Component Value Date    ALKPHOS 79 10/01/2023    ALT 16 10/01/2023    AST 15 10/01/2023    PROT 6.7 10/01/2023    BILITOT 0.3 10/01/2023     Magnesium:    Lab Results   Component Value Date    MG 1.50 (L) 10/01/2023     PT/INR:    Lab Results   Component Value Date    PROTIME 13.0 (H) 09/30/2023    INR 1.2 (H) 09/30/2023     Lipid Profile:    Lab Results   Component Value Date    TRIG 177 (H) 10/01/2023    HDL 22.2 10/01/2023    LDLCALC 43 (L) 10/01/2023     TSH:    Lab Results   Component Value Date    TSH 0.67 10/01/2023       RADIOLOGY     Nuclear Stress Test   Final Result   1.  Normal myocardial perfusion study without evidence of ischemia or   prior infarction.   2. The left ventricle is normal in size.   3. Normal LV wall motion with an LV EF estimated at 65%.             I personally reviewed the images/study and I agree with the findings   as stated. This study was interpreted at [place], [place].        Signed by: Eddie Gold 10/3/2023 12:41 PM   Dictation workstation:   [id number]      Transthoracic Echo (TTE) Complete   Final Result      XR chest 1 view   Final Result   1.  No evidence of acute cardiopulmonary process.                   MACRO:   None        Signed by: Toni Sotelo 9/30/2023 5:08 PM   Dictation workstation:   [id number]      Cardiology Interpretation Of Nuclear Stress - See Other Report For Nuclear Portion    (Results Pending)       CURRENT MEDICATIONS    aspirin, 81 mg, oral, Daily  atorvastatin, 80 mg, oral, Daily  buprenorphine-naloxone, 1 tablet, sublingual, BID  cefTRIAXone, 1 g, intravenous, q24h  cholecalciferol, 50 mcg, oral, Nightly  cyanocobalamin, 1,000 mcg, oral, Daily  enoxaparin, 40 mg, subcutaneous, Daily  fluticasone furoate-vilanteroL, 1 puff, inhalation, Daily  gabapentin, 400 mg, oral, q8h LES  hydrALAZINE, 50 mg, oral, q12h  hydroCHLOROthiazide, 12.5 mg, oral, Daily  insulin lispro, 0-10 Units, subcutaneous, Before meals & nightly  losartan, 100 mg, oral, Daily  [Held by provider] metFORMIN, 1,000 mg, oral, BID with meals  nicotine, 1 patch, transdermal, Daily  pantoprazole, 40 mg, oral, Daily before breakfast  polyethylene glycol, 17 g, oral, Daily  QUEtiapine, 50 mg, oral, Nightly  rOPINIRole, 2 mg, oral, Nightly  Tc-99m tetrofosmin, 34.7 millicurie, intravenous, Once in imaging  tiotropium, 2 puff, inhalation, Daily      lactated Ringer's, 100 mL/hr, Last Rate: Stopped (10/03/23 1400)        ASSESSMENT   Principal Problem:    Urinary tract infection  Active Problems:    Acute cystitis without hematuria   Elevated coronary calcium score    Patient Active Problem List   Diagnosis    1st MTP arthritis    Abnormal mammogram    Anxiety and depression    Bipolar affective disorder (CMS/HCC)    Chronic constipation    Chronic low back pain with sciatica    Type 2 diabetes mellitus (CMS/HCC)    COPD (chronic obstructive pulmonary disease) (CMS/HCC)    Coronary artery calcification seen on CT scan    Essential hypertension, benign    Forgetfulness    Gait disturbance    Ganglion cyst of wrist    Hematuria    HSV-2 infection    Hyperlipidemia    Insomnia    LVH (left ventricular hypertrophy)     Mass of joint of right wrist    Peripheral neuropathy    Plantar fasciitis    Restless legs syndrome    B12 deficiency    Arthralgia of right knee    CAD (coronary artery disease)    Lumbar spinal stenosis    Lymphocytosis    Neurogenic claudication    PAD (peripheral artery disease) (CMS/HCC)    Compression fracture of thoracic vertebra (CMS/HCC)    Sacroiliitis, not elsewhere classified (CMS/[facility])    Opioid dependence, in remission (CMS/[facility])    Urinary tract infection    Acute cystitis without hematuria       PLAN     Perfusion study is negative.  Elevated coronary calcium score but no flow limited stenoses.     Blood pressures are not optimal.  Will advise to increase hydralazine to 100 mg po BID and add aldactone 25 mg daily.     OK to discharge home.      Follow-up with Dr. Clark as scheduled.     Please do not hesitate to call with questions.  Electronically signed by Alexey Neumann MD, FACC on 10/3/2023 at 2:57 PM

## 2023-10-04 ENCOUNTER — PATIENT OUTREACH (OUTPATIENT)
Dept: CARE COORDINATION | Facility: CLINIC | Age: 58
End: 2023-10-04
Payer: MEDICARE

## 2023-10-04 NOTE — PROGRESS NOTES
Discharge Facility: [facility]  Discharge Diagnosis: UTI, Acute cystitis without hematuria  Admission Date: 9/30/2023  Discharge Date: 10/3/2023    PCP Appointment Date:  -10/30/2023 1300    Specialist Appointment Date:   -10/25/2023 0915 dexa bone density  -10/25/2023 1045 mammogram screening    Hospital Encounter and Summary: Not available at this time    Unable to reach patient x2 attempts. Voicemail left with call back number.

## 2023-10-05 ENCOUNTER — TELEPHONE (OUTPATIENT)
Dept: PRIMARY CARE | Facility: CLINIC | Age: 58
End: 2023-10-05
Payer: MEDICARE

## 2023-10-05 NOTE — TELEPHONE ENCOUNTER
Patient was in ER.   Was started on Spirolactone. Ms hernandez was reading about it and doesn't think its safe due to drug interaction.   Her B/P is elevated at 188/101.  Please advise patient.

## 2023-10-05 NOTE — TELEPHONE ENCOUNTER
I spoke with her.  She was recently hospitalized for UTI and had high blood pressure, and was discharged with spironolactone 25 mg daily.  She has a history of hypertension and is also taking hydralazine 100 mg twice daily, HCTZ 12.5 mg daily and losartan 100 mg daily.  She wanted to make sure that it was okay for her to take this medication with her other meds.  Her recent home blood pressure readings have been 178/103 and 172/101, however she has not been taking spironolactone regularly since discharge until today.  We discussed that the main interaction would be with her losartan, causing a risk of increased potassium.  Her last potassium level was low at 3.4, so I feel that as long as we monitor her potassium closely it would be okay to continue this medication.  She will schedule a hospital follow-up with me next week and we will plan on rechecking her potassium levels and blood pressure at that time

## 2023-10-12 ENCOUNTER — OFFICE VISIT (OUTPATIENT)
Dept: PRIMARY CARE | Facility: CLINIC | Age: 58
End: 2023-10-12
Payer: MEDICARE

## 2023-10-12 ENCOUNTER — TELEPHONE (OUTPATIENT)
Dept: PRIMARY CARE | Facility: CLINIC | Age: 58
End: 2023-10-12

## 2023-10-12 VITALS
OXYGEN SATURATION: 96 % | HEIGHT: 60 IN | WEIGHT: 161 LBS | BODY MASS INDEX: 31.61 KG/M2 | HEART RATE: 111 BPM | SYSTOLIC BLOOD PRESSURE: 138 MMHG | DIASTOLIC BLOOD PRESSURE: 84 MMHG

## 2023-10-12 DIAGNOSIS — E78.49 OTHER HYPERLIPIDEMIA: ICD-10-CM

## 2023-10-12 DIAGNOSIS — E55.9 VITAMIN D DEFICIENCY: ICD-10-CM

## 2023-10-12 DIAGNOSIS — D64.9 ANEMIA, UNSPECIFIED TYPE: ICD-10-CM

## 2023-10-12 DIAGNOSIS — G62.89 OTHER POLYNEUROPATHY: Primary | ICD-10-CM

## 2023-10-12 DIAGNOSIS — E11.42 TYPE 2 DIABETES MELLITUS WITH DIABETIC POLYNEUROPATHY, WITHOUT LONG-TERM CURRENT USE OF INSULIN (MULTI): ICD-10-CM

## 2023-10-12 DIAGNOSIS — I10 ESSENTIAL HYPERTENSION, BENIGN: Primary | ICD-10-CM

## 2023-10-12 DIAGNOSIS — D72.829 LEUKOCYTOSIS, UNSPECIFIED TYPE: Primary | ICD-10-CM

## 2023-10-12 PROBLEM — N30.00 ACUTE CYSTITIS WITHOUT HEMATURIA: Status: RESOLVED | Noted: 2023-09-30 | Resolved: 2023-10-12

## 2023-10-12 LAB — POC HEMOGLOBIN A1C: 7.2 % (ref 4.2–6.5)

## 2023-10-12 PROCEDURE — 3048F LDL-C <100 MG/DL: CPT | Performed by: FAMILY MEDICINE

## 2023-10-12 PROCEDURE — 83036 HEMOGLOBIN GLYCOSYLATED A1C: CPT | Performed by: FAMILY MEDICINE

## 2023-10-12 PROCEDURE — 99214 OFFICE O/P EST MOD 30 MIN: CPT | Performed by: FAMILY MEDICINE

## 2023-10-12 PROCEDURE — 3075F SYST BP GE 130 - 139MM HG: CPT | Performed by: FAMILY MEDICINE

## 2023-10-12 PROCEDURE — 3008F BODY MASS INDEX DOCD: CPT | Performed by: FAMILY MEDICINE

## 2023-10-12 PROCEDURE — 3079F DIAST BP 80-89 MM HG: CPT | Performed by: FAMILY MEDICINE

## 2023-10-12 PROCEDURE — 3051F HG A1C>EQUAL 7.0%<8.0%: CPT | Performed by: FAMILY MEDICINE

## 2023-10-12 PROCEDURE — 3066F NEPHROPATHY DOC TX: CPT | Performed by: FAMILY MEDICINE

## 2023-10-12 PROCEDURE — 4004F PT TOBACCO SCREEN RCVD TLK: CPT | Performed by: FAMILY MEDICINE

## 2023-10-12 PROCEDURE — 4010F ACE/ARB THERAPY RXD/TAKEN: CPT | Performed by: FAMILY MEDICINE

## 2023-10-12 ASSESSMENT — ENCOUNTER SYMPTOMS
NUMBNESS: 1
COUGH: 0
FEVER: 0
BACK PAIN: 1

## 2023-10-12 NOTE — PROGRESS NOTES
Subjective   Patient ID: Rachael Ruiz is a 58 y.o. female who presents for Diabetes, Hyperlipidemia, UTI (Hospital follow up. ), OTHER (Requesting referral for scooter. ), and Numbness (To fingertips x 1 month ).    Diabetes  She presents for her follow-up diabetic visit. She has type 2 diabetes mellitus. Pertinent negatives for diabetes include no chest pain. Eye exam is current.   Hyperlipidemia  Pertinent negatives include no chest pain.   UTI   The problem has been resolved.      She is here today for follow-up on diabetes and hypertension  Diabetes mellitus: She is currently taking Trulicity 1.5 mg weekly and metformin 1000 mg twice daily.  Her A1c at last visit 6/30 was slightly elevated at 7.2%, and we increased her dose of metformin to 1000 mg twice daily at that time  She has been checking her glucose out of the office and all readings have been less than 132  She has a history of diabetic neuropathy currently taking gabapentin.  She is currently in the process of establishing with a new neurologist.  She has chronic pain and paresthesias involving both legs  She was recently hospitalized for a UTI which is since resolved.  Her antihypertensive medications were adjusted and she is now taking spironolactone 25 mg daily, hydralazine 100 mg twice daily, losartan 100 mg daily and HCTZ 12.5 mg daily  She is currently taking atorvastatin 80 mg daily for hyperlipidemia.  Last LDL done 10/1/2023 was 43    Exercise has been limited due to her neuropathy and chronic back pain.  She currently uses a wheelchair        Review of Systems   Constitutional:  Negative for fever.   Respiratory:  Negative for cough.    Cardiovascular:  Negative for chest pain and leg swelling.   Musculoskeletal:  Positive for back pain.   Neurological:  Positive for numbness.       Objective   /84   Pulse (!) 111   Ht 1.524 m (5')   Wt 73 kg (161 lb)   SpO2 96%   BMI 31.44 kg/m²     Physical Exam  Vitals reviewed.    Constitutional:       General: She is not in acute distress.     Appearance: Normal appearance. She is well-developed.   HENT:      Head: Normocephalic.   Eyes:      Conjunctiva/sclera: Conjunctivae normal.   Cardiovascular:      Rate and Rhythm: Normal rate and regular rhythm.      Heart sounds: Normal heart sounds.   Pulmonary:      Effort: Pulmonary effort is normal.      Breath sounds: Normal breath sounds.   Musculoskeletal:      Right lower leg: No edema.      Left lower leg: No edema.      Comments: Currently in wheelchair   Skin:     Findings: No rash.   Neurological:      Mental Status: She is alert.      Sensory: Sensory deficit present.      Comments: Decree sensation to light touch both lower extremities   Psychiatric:         Mood and Affect: Mood normal.         Behavior: Behavior normal.         Assessment/Plan   Problem List Items Addressed This Visit          Medium    Essential hypertension, benign - Primary    Relevant Orders    CBC and Auto Differential    Basic Metabolic Panel    Vitamin D 25-Hydroxy,Total (for eval of Vitamin D levels)    Vitamin B12    Folate    Iron and TIBC    Type 2 diabetes mellitus (CMS/HCC)    Relevant Medications    dulaglutide 3 mg/0.5 mL pen injector    Other Relevant Orders    POCT glycosylated hemoglobin (Hb A1C) manually resulted (Completed)     Other Visit Diagnoses       Anemia, unspecified type        Relevant Orders    CBC and Auto Differential    Basic Metabolic Panel    Vitamin D 25-Hydroxy,Total (for eval of Vitamin D levels)    Vitamin B12    Folate    Iron and TIBC    Vitamin D deficiency        Relevant Orders    CBC and Auto Differential    Basic Metabolic Panel    Vitamin D 25-Hydroxy,Total (for eval of Vitamin D levels)    Vitamin B12    Folate    Iron and TIBC        #1 diabetes mellitus: Her A1c today is slightly elevated at 7.2%.  We will increase her dose of Trulicity from 1.5 to 3 mg weekly and continue metformin.  Continue to monitor glucose  out of office and follow-up in 3 months to recheck A1c  2.  Hypertension: Her blood pressure today has improved to 138/84.  We will continue current medications and follow-up in 3 months  #3 hyperlipidemia: Continue atorvastatin.  Last LDL was at goal  She will continue to work on getting established with a neurologist for her diabetic neuropathy.  Continue gabapentin.  She does not need a refill on this today  I did review her recent labs, and she has had an anemia present since 6/14/2023.  Since then her hemoglobin level has ranged from 10.4-11.6, most recently 10.4 on 10/30/2023.  We will recheck a CBC, along with iron, B12 and folate levels and follow-up by phone once I have results  Follow-up in 3 months to recheck

## 2023-10-12 NOTE — TELEPHONE ENCOUNTER
Patient called, states she was advised that her b-12, vitamin d3, and fish oil should all be 1000 mg.    Fish oil is 1000 mg.  B-12 is 5000 mcg  Vitamin D3 125 mcg    Do you want to send in new prescriptions for B-12 and Vitamin D? Please advise.

## 2023-10-15 DIAGNOSIS — J44.9 CHRONIC OBSTRUCTIVE PULMONARY DISEASE, UNSPECIFIED COPD TYPE (MULTI): ICD-10-CM

## 2023-10-16 ENCOUNTER — TELEPHONE (OUTPATIENT)
Dept: PRIMARY CARE | Facility: CLINIC | Age: 58
End: 2023-10-16
Payer: MEDICARE

## 2023-10-16 RX ORDER — ALBUTEROL SULFATE 90 UG/1
AEROSOL, METERED RESPIRATORY (INHALATION)
Qty: 18 G | Refills: 1 | Status: SHIPPED | OUTPATIENT
Start: 2023-10-16 | End: 2023-12-14

## 2023-10-16 NOTE — TELEPHONE ENCOUNTER
11/21/17  Name: Gem De La Rosa  YOB: 1978  39 year old   Race: White   Gender: female        Assessment     1. Regular astigmatism of both eyes           Plan   1. Patient is provided with the finalized contact lens prescription and reminded that it is valid for one (1) year from the date it was written (11/21/17). FDA regulations require contact lens fittings be done on an annual basis.        Rosette Morgan O.D.  [address] [place] [address] [address]  [place] [address] [phone]         I reviewed her labs from 10/12/2023 which showed the following:  CBC: White blood cell count slightly elevated at 11.9.  Platelets elevated at 494.  Hemoglobin is in normal range at 12.7  BMP showed glucose 126, calcium 10.4.  Otherwise unremarkable  Vitamin B12, folic acid and iron panel were normal range  Vitamin D level was normal range at 58.5  We ordered these labs to further work-up anemia which has been present on her last several lab draws, most recently 10.4 on 10/3/2023.  Calcium at that time was 8.9.  White blood cell count and platelet count were both in the normal range at that time  We will plan on repeating her CBC and BMP again in 1 to 2 months to confirm that calcium and platelet and white blood cell count have returned to normal range

## 2023-10-19 ENCOUNTER — PATIENT OUTREACH (OUTPATIENT)
Dept: CARE COORDINATION | Facility: CLINIC | Age: 58
End: 2023-10-19
Payer: MEDICARE

## 2023-10-19 ENCOUNTER — TELEPHONE (OUTPATIENT)
Dept: PRIMARY CARE | Facility: CLINIC | Age: 58
End: 2023-10-19
Payer: MEDICARE

## 2023-10-19 DIAGNOSIS — D72.829 LEUKOCYTOSIS, UNSPECIFIED TYPE: Primary | ICD-10-CM

## 2023-10-24 ENCOUNTER — HOSPITAL ENCOUNTER (OUTPATIENT)
Dept: CARDIOLOGY | Facility: HOSPITAL | Age: 58
Discharge: HOME | End: 2023-10-24
Payer: MEDICARE

## 2023-10-24 PROCEDURE — 93005 ELECTROCARDIOGRAM TRACING: CPT

## 2023-10-25 ENCOUNTER — TELEPHONE (OUTPATIENT)
Dept: PRIMARY CARE | Facility: CLINIC | Age: 58
End: 2023-10-25
Payer: MEDICARE

## 2023-10-25 ENCOUNTER — APPOINTMENT (OUTPATIENT)
Dept: RADIOLOGY | Facility: HOSPITAL | Age: 58
End: 2023-10-25
Payer: MEDICARE

## 2023-10-30 ENCOUNTER — APPOINTMENT (OUTPATIENT)
Dept: PRIMARY CARE | Facility: CLINIC | Age: 58
End: 2023-10-30
Payer: MEDICARE

## 2023-10-31 DIAGNOSIS — E11.42 TYPE 2 DIABETES MELLITUS WITH DIABETIC POLYNEUROPATHY, WITHOUT LONG-TERM CURRENT USE OF INSULIN (MULTI): ICD-10-CM

## 2023-10-31 RX ORDER — GABAPENTIN 800 MG/1
800 TABLET ORAL
Qty: 96 TABLET | Refills: 0 | OUTPATIENT
Start: 2023-10-31

## 2023-10-31 NOTE — TELEPHONE ENCOUNTER
Rx Refill Request Telephone Encounter    Name:  Rachael Ruiz  :  [id number]  Medication Name: Gabapentin 800 mg refill request    She has a Neurology appointment, she doesn't remember what it is. Waiting for letter with appt.

## 2023-10-31 NOTE — TELEPHONE ENCOUNTER
Patient called the pharmacy and verified that she has already filled the refill. Has no more Gabapentin.

## 2023-11-01 ENCOUNTER — APPOINTMENT (OUTPATIENT)
Dept: CARDIOLOGY | Facility: CLINIC | Age: 58
End: 2023-11-01
Payer: MEDICARE

## 2023-11-01 RX ORDER — GABAPENTIN 800 MG/1
800 TABLET ORAL
Qty: 84 TABLET | Refills: 0 | OUTPATIENT
Start: 2023-11-01

## 2023-11-07 ENCOUNTER — TELEMEDICINE (OUTPATIENT)
Dept: PRIMARY CARE | Facility: CLINIC | Age: 58
End: 2023-11-07
Payer: MEDICARE

## 2023-11-07 ENCOUNTER — TELEPHONE (OUTPATIENT)
Dept: PRIMARY CARE | Facility: CLINIC | Age: 58
End: 2023-11-07

## 2023-11-07 DIAGNOSIS — B00.9 HSV-2 INFECTION: ICD-10-CM

## 2023-11-07 PROCEDURE — 99441 PR PHYS/QHP TELEPHONE EVALUATION 5-10 MIN: CPT | Performed by: FAMILY MEDICINE

## 2023-11-07 RX ORDER — ALPRAZOLAM 0.5 MG/1
TABLET ORAL
COMMUNITY
Start: 2023-11-01

## 2023-11-07 RX ORDER — VENLAFAXINE HYDROCHLORIDE 37.5 MG/1
37.5 CAPSULE, EXTENDED RELEASE ORAL DAILY
COMMUNITY
Start: 2023-10-15

## 2023-11-07 RX ORDER — VENLAFAXINE HYDROCHLORIDE 75 MG/1
75 CAPSULE, EXTENDED RELEASE ORAL DAILY
COMMUNITY
Start: 2023-10-15

## 2023-11-07 RX ORDER — VALACYCLOVIR HYDROCHLORIDE 1 G/1
1000 TABLET, FILM COATED ORAL 2 TIMES DAILY
Qty: 14 TABLET | Refills: 0 | Status: SHIPPED | OUTPATIENT
Start: 2023-11-07 | End: 2023-11-14

## 2023-11-07 NOTE — PROGRESS NOTES
Subjective   Patient ID: Rachael Ruiz is a 58 y.o. female who presents for Rash.    HPI     Today's visit was done over the telephone  She is here today to discuss rash  She has had a rash on her left buttocks area for 2 days.  Rash has been getting larger.  She states it is the size of a nickel.  Rash is both slightly painful and slightly itchy.  No fever or drainage.  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  This was confirmed with previous culture.  She has been treated with valacyclovir as needed and the rash typically will resolve with this medication.  She states that her current flareup is similar to what she has noticed in the past with prior flareups    Review of Systems    Objective   There were no vitals taken for this visit.    Physical Exam    Assessment/Plan   Problem List Items Addressed This Visit          Medium    HSV-2 infection    Relevant Medications    valACYclovir (Valtrex) 1 gram tablet   She presents today with a 2-day history of a rash on her left buttocks, with a history of similar flareups in the past due to cutaneous HSV.  I was not able to examine the area since today's visit was done virtually, but based on her description I suspect that this is another flareup of cutaneous HSV.  We will treat with valacyclovir 1 g twice daily for total of 7 days.  Follow-up in 3 to 5 days if not resolving, advised to call earlier or go to ED if any severe symptoms or rash worsens  Total time spent today on telephone was 5 to 10 minutes

## 2023-11-07 NOTE — TELEPHONE ENCOUNTER
"Patient left  requesting antibiotic for \"bump on butt\", \"The same as last time\". Please advise.   " room air

## 2023-11-11 DIAGNOSIS — E11.42 TYPE 2 DIABETES MELLITUS WITH DIABETIC POLYNEUROPATHY, WITHOUT LONG-TERM CURRENT USE OF INSULIN (MULTI): ICD-10-CM

## 2023-11-11 DIAGNOSIS — E11.9 TYPE 2 DIABETES MELLITUS WITHOUT COMPLICATION, UNSPECIFIED WHETHER LONG TERM INSULIN USE (MULTI): ICD-10-CM

## 2023-11-13 RX ORDER — BLOOD SUGAR DIAGNOSTIC
STRIP MISCELLANEOUS
Qty: 100 STRIP | Refills: 3 | Status: SHIPPED | OUTPATIENT
Start: 2023-11-13 | End: 2024-05-06 | Stop reason: SDUPTHER

## 2023-11-13 RX ORDER — GABAPENTIN 800 MG/1
800 TABLET ORAL
Qty: 60 TABLET | Refills: 0 | Status: SHIPPED | OUTPATIENT
Start: 2023-11-13 | End: 2023-11-22 | Stop reason: SDUPTHER

## 2023-11-13 NOTE — TELEPHONE ENCOUNTER
Rx Refill Request Telephone Encounter    Name:  Rachael Ruiz  :  [id number]  Medication Name:  Gabapentin refill request    Spoke with patient over the phone states she has neurology appointment set up for 2023.

## 2023-11-14 ENCOUNTER — TELEPHONE (OUTPATIENT)
Dept: PRIMARY CARE | Facility: CLINIC | Age: 58
End: 2023-11-14
Payer: MEDICARE

## 2023-11-14 NOTE — TELEPHONE ENCOUNTER
Patient left vm about buttock region. Had daughter look at site and believes to be a bed sore. Patient also has some bp concerns, numbers are low. In addition she states she has numbness to hands. Wants a sooner appt than Jan 2024 to have this looked at. Please advise.

## 2023-11-16 DIAGNOSIS — E11.42 TYPE 2 DIABETES MELLITUS WITH DIABETIC POLYNEUROPATHY, WITHOUT LONG-TERM CURRENT USE OF INSULIN (MULTI): ICD-10-CM

## 2023-11-16 NOTE — TELEPHONE ENCOUNTER
Rx Refill Request Telephone Encounter    Name:  Rachael Ruiz  :  [id number]  Medication Name:  Trulicity refill request

## 2023-11-17 ENCOUNTER — APPOINTMENT (OUTPATIENT)
Dept: RADIOLOGY | Facility: HOSPITAL | Age: 58
End: 2023-11-17
Payer: MEDICARE

## 2023-11-21 ENCOUNTER — TELEPHONE (OUTPATIENT)
Dept: PRIMARY CARE | Facility: CLINIC | Age: 58
End: 2023-11-21
Payer: MEDICARE

## 2023-11-21 NOTE — TELEPHONE ENCOUNTER
Rylee from [facility] called; wanted to inform you that pt is transitioning to transwaiver services at home.

## 2023-11-22 ENCOUNTER — APPOINTMENT (OUTPATIENT)
Dept: PRIMARY CARE | Facility: CLINIC | Age: 58
End: 2023-11-22
Payer: MEDICARE

## 2023-11-22 DIAGNOSIS — E11.42 TYPE 2 DIABETES MELLITUS WITH DIABETIC POLYNEUROPATHY, WITHOUT LONG-TERM CURRENT USE OF INSULIN (MULTI): ICD-10-CM

## 2023-11-22 RX ORDER — GABAPENTIN 800 MG/1
800 TABLET ORAL
Qty: 120 TABLET | Refills: 1 | Status: SHIPPED | OUTPATIENT
Start: 2023-11-22 | End: 2024-01-17

## 2023-11-22 NOTE — TELEPHONE ENCOUNTER
Pt rescheduled neurology appt 12/27/2023. Requesting another refill of Gabapentin please advise.     Refill pended ready to be signed.

## 2023-12-01 DIAGNOSIS — J41.0 SIMPLE CHRONIC BRONCHITIS (MULTI): ICD-10-CM

## 2023-12-01 RX ORDER — FLUTICASONE FUROATE AND VILANTEROL 200; 25 UG/1; UG/1
1 POWDER RESPIRATORY (INHALATION) DAILY
Qty: 1 EACH | Refills: 0 | Status: SHIPPED | OUTPATIENT
Start: 2023-12-01 | End: 2024-01-05

## 2023-12-01 NOTE — TELEPHONE ENCOUNTER
Rx Refill Request Telephone Encounter    Name:  Rachael Ruiz  :  [id number]  Medication Name: Breo refill request

## 2023-12-13 LAB
HOLD SPECIMEN: NORMAL

## 2023-12-14 ENCOUNTER — APPOINTMENT (OUTPATIENT)
Dept: PRIMARY CARE | Facility: CLINIC | Age: 58
End: 2023-12-14
Payer: MEDICARE

## 2023-12-14 DIAGNOSIS — J44.9 CHRONIC OBSTRUCTIVE PULMONARY DISEASE, UNSPECIFIED COPD TYPE (MULTI): ICD-10-CM

## 2023-12-14 DIAGNOSIS — I10 ESSENTIAL HYPERTENSION, BENIGN: ICD-10-CM

## 2023-12-14 RX ORDER — ALBUTEROL SULFATE 90 UG/1
AEROSOL, METERED RESPIRATORY (INHALATION)
Qty: 18 G | Refills: 1 | Status: SHIPPED | OUTPATIENT
Start: 2023-12-14 | End: 2024-02-12

## 2023-12-14 RX ORDER — HYDROCHLOROTHIAZIDE 12.5 MG/1
12.5 TABLET ORAL DAILY
Qty: 90 TABLET | Refills: 1 | Status: SHIPPED | OUTPATIENT
Start: 2023-12-14 | End: 2024-05-06 | Stop reason: SDUPTHER

## 2023-12-15 ENCOUNTER — TELEPHONE (OUTPATIENT)
Dept: HEMATOLOGY/ONCOLOGY | Facility: CLINIC | Age: 58
End: 2023-12-15
Payer: MEDICARE

## 2023-12-15 NOTE — TELEPHONE ENCOUNTER
Message left for Rachael about her missed appointment with Dr. Pineda. Office number was left and I encouraged her to call back to get her rescheduled with Dr. Pineda for her next available. I will wait her return call.

## 2023-12-18 ENCOUNTER — HOSPITAL ENCOUNTER (OUTPATIENT)
Dept: RADIOLOGY | Facility: HOSPITAL | Age: 58
Discharge: HOME | End: 2023-12-18
Payer: MEDICARE

## 2023-12-18 ENCOUNTER — TELEPHONE (OUTPATIENT)
Dept: PRIMARY CARE | Facility: CLINIC | Age: 58
End: 2023-12-18
Payer: MEDICARE

## 2023-12-18 DIAGNOSIS — Z78.0 ENCOUNTER FOR OSTEOPOROSIS SCREENING IN ASYMPTOMATIC POSTMENOPAUSAL PATIENT: ICD-10-CM

## 2023-12-18 DIAGNOSIS — Z13.820 ENCOUNTER FOR OSTEOPOROSIS SCREENING IN ASYMPTOMATIC POSTMENOPAUSAL PATIENT: ICD-10-CM

## 2023-12-18 DIAGNOSIS — Z12.31 BREAST CANCER SCREENING BY MAMMOGRAM: ICD-10-CM

## 2023-12-18 PROCEDURE — 77063 BREAST TOMOSYNTHESIS BI: CPT | Mod: BILATERAL PROCEDURE | Performed by: RADIOLOGY

## 2023-12-18 PROCEDURE — 77081 DXA BONE DENSITY APPENDICULR: CPT

## 2023-12-18 PROCEDURE — 77067 SCR MAMMO BI INCL CAD: CPT | Mod: BILATERAL PROCEDURE | Performed by: RADIOLOGY

## 2023-12-18 PROCEDURE — 77080 DXA BONE DENSITY AXIAL: CPT | Performed by: RADIOLOGY

## 2023-12-18 PROCEDURE — 77063 BREAST TOMOSYNTHESIS BI: CPT

## 2023-12-18 NOTE — TELEPHONE ENCOUNTER
Pt called, states that she received a call from the cancer center that she missed an appt. She thought that appt was cancelled and a new one scheduled for the [facility] [place] [facility]. I do not see any of this documented, can you call her back please? Thank you.

## 2023-12-19 DIAGNOSIS — G25.81 RESTLESS LEGS SYNDROME: ICD-10-CM

## 2023-12-19 RX ORDER — ROPINIROLE 1 MG/1
2 TABLET, FILM COATED ORAL NIGHTLY
Qty: 60 TABLET | Refills: 0 | Status: SHIPPED | OUTPATIENT
Start: 2023-12-19 | End: 2024-01-23 | Stop reason: SDUPTHER

## 2023-12-29 DIAGNOSIS — E11.42 TYPE 2 DIABETES MELLITUS WITH DIABETIC POLYNEUROPATHY, WITHOUT LONG-TERM CURRENT USE OF INSULIN (MULTI): ICD-10-CM

## 2023-12-29 DIAGNOSIS — G25.81 RESTLESS LEGS SYNDROME: ICD-10-CM

## 2023-12-29 RX ORDER — ROPINIROLE 1 MG/1
2 TABLET, FILM COATED ORAL NIGHTLY
Qty: 60 TABLET | Refills: 1 | OUTPATIENT
Start: 2023-12-29

## 2023-12-29 RX ORDER — GABAPENTIN 800 MG/1
800 TABLET ORAL
Qty: 200 TABLET | Refills: 0 | OUTPATIENT
Start: 2023-12-29 | End: 2024-02-27

## 2023-12-29 NOTE — TELEPHONE ENCOUNTER
Patient recently tested positive for covid. Had to reschedule neurology appt to 2/19/2024 at 1pm. Requesting refill on ropinirole and Gabapentin. Please advise.

## 2024-01-04 DIAGNOSIS — J41.0 SIMPLE CHRONIC BRONCHITIS (MULTI): ICD-10-CM

## 2024-01-05 ENCOUNTER — APPOINTMENT (OUTPATIENT)
Dept: ORTHOPEDIC SURGERY | Facility: CLINIC | Age: 59
End: 2024-01-05
Payer: MEDICARE

## 2024-01-05 RX ORDER — FLUTICASONE FUROATE AND VILANTEROL TRIFENATATE 200; 25 UG/1; UG/1
POWDER RESPIRATORY (INHALATION)
Qty: 1 EACH | Refills: 3 | Status: SHIPPED | OUTPATIENT
Start: 2024-01-05 | End: 2024-04-17

## 2024-01-15 ENCOUNTER — APPOINTMENT (OUTPATIENT)
Dept: PRIMARY CARE | Facility: CLINIC | Age: 59
End: 2024-01-15
Payer: MEDICARE

## 2024-01-17 DIAGNOSIS — E11.42 TYPE 2 DIABETES MELLITUS WITH DIABETIC POLYNEUROPATHY, WITHOUT LONG-TERM CURRENT USE OF INSULIN (MULTI): ICD-10-CM

## 2024-01-17 RX ORDER — GABAPENTIN 800 MG/1
800 TABLET ORAL
Qty: 120 TABLET | Refills: 0 | Status: SHIPPED | OUTPATIENT
Start: 2024-01-17 | End: 2024-02-12

## 2024-01-23 DIAGNOSIS — G25.81 RESTLESS LEGS SYNDROME: ICD-10-CM

## 2024-01-23 RX ORDER — ROPINIROLE 1 MG/1
2 TABLET, FILM COATED ORAL NIGHTLY
Qty: 60 TABLET | Refills: 0 | Status: SHIPPED | OUTPATIENT
Start: 2024-01-23 | End: 2024-03-11 | Stop reason: SDUPTHER

## 2024-01-23 NOTE — TELEPHONE ENCOUNTER
Pt requesting requip refill.   In addition she wanted to let you know her Neurology appt is 2/13/2024

## 2024-02-06 ENCOUNTER — APPOINTMENT (OUTPATIENT)
Dept: ORTHOPEDIC SURGERY | Facility: CLINIC | Age: 59
End: 2024-02-06
Payer: MEDICARE

## 2024-02-11 NOTE — TELEPHONE ENCOUNTER
Intensive Care Daily Quality Rounding Checklist        ICU Team Members: Dr. Andrade, Residents Kerry Moreland, Percy, & Elan, Bedside nurse, Charge nurse, Clinical pharmacist      ICU Day #: NUMBER: 4     Intubation Date:  n/a     Ventilator Day #: n/a     Central Line Insertion Date: February 7                                                    Day #: NUMBER: 4                                                    Indication: CVCIndication: Hemodynamically unstable requiring volume resuscitation      Arterial Line Insertion Date: February 7                             Day #: NUMBER: 4     Temporary Hemodialysis Catheter Insertion Date:  n/a                             Day # n/a     DVT Prophylaxis: Heparin gtt.    GI Prophylaxis: Protonix     Reese Catheter Insertion Date: February 7                                        Day #: 4                             Indications: Urinary retention                             Continued need (if yes, reason documented and discussed with physician): yes,      Skin Issues/ Wounds and ordered treatment discussed on rounds: none, SOS precautions.     Goals/ Plans for the Day: Monitor labs and vitals, treat/replace as needed. rocephin for UTI/PNA. PT/OT eval. Order chest x-ray, transfer to IM     Reviewed plan and goals for day with patient and/or representative: Yes    Rx Refill Request Telephone Encounter    Name:  Rachael Ruiz  :  [id number]  Medication Name:  Gabapentin refill request

## 2024-02-12 DIAGNOSIS — J44.9 CHRONIC OBSTRUCTIVE PULMONARY DISEASE, UNSPECIFIED COPD TYPE (MULTI): ICD-10-CM

## 2024-02-12 DIAGNOSIS — E11.42 TYPE 2 DIABETES MELLITUS WITH DIABETIC POLYNEUROPATHY, WITHOUT LONG-TERM CURRENT USE OF INSULIN (MULTI): ICD-10-CM

## 2024-02-12 RX ORDER — ALBUTEROL SULFATE 90 UG/1
AEROSOL, METERED RESPIRATORY (INHALATION)
Qty: 18 G | Refills: 1 | Status: SHIPPED | OUTPATIENT
Start: 2024-02-12 | End: 2024-03-18

## 2024-02-12 RX ORDER — GABAPENTIN 800 MG/1
800 TABLET ORAL 4 TIMES DAILY
Qty: 120 TABLET | Refills: 0 | Status: SHIPPED | OUTPATIENT
Start: 2024-02-12 | End: 2024-03-11 | Stop reason: SDUPTHER

## 2024-02-13 ENCOUNTER — TELEPHONE (OUTPATIENT)
Dept: PRIMARY CARE | Facility: CLINIC | Age: 59
End: 2024-02-13

## 2024-02-13 ENCOUNTER — TELEMEDICINE (OUTPATIENT)
Dept: PRIMARY CARE | Facility: CLINIC | Age: 59
End: 2024-02-13
Payer: MEDICARE

## 2024-02-13 VITALS — SYSTOLIC BLOOD PRESSURE: 130 MMHG | DIASTOLIC BLOOD PRESSURE: 80 MMHG

## 2024-02-13 DIAGNOSIS — J44.9 CHRONIC OBSTRUCTIVE PULMONARY DISEASE, UNSPECIFIED COPD TYPE (MULTI): ICD-10-CM

## 2024-02-13 DIAGNOSIS — F11.21 OPIOID DEPENDENCE, IN REMISSION (MULTI): ICD-10-CM

## 2024-02-13 DIAGNOSIS — E11.42 TYPE 2 DIABETES MELLITUS WITH DIABETIC POLYNEUROPATHY, WITHOUT LONG-TERM CURRENT USE OF INSULIN (MULTI): Primary | ICD-10-CM

## 2024-02-13 DIAGNOSIS — N94.9 GENITAL LESION, FEMALE: ICD-10-CM

## 2024-02-13 DIAGNOSIS — Z12.11 SCREENING FOR COLON CANCER: ICD-10-CM

## 2024-02-13 DIAGNOSIS — I10 HYPERTENSION, UNSPECIFIED TYPE: ICD-10-CM

## 2024-02-13 DIAGNOSIS — M25.559 HIP PAIN, UNSPECIFIED LATERALITY: ICD-10-CM

## 2024-02-13 DIAGNOSIS — E78.5 HYPERLIPIDEMIA, UNSPECIFIED HYPERLIPIDEMIA TYPE: ICD-10-CM

## 2024-02-13 DIAGNOSIS — F31.9 BIPOLAR AFFECTIVE DISORDER, REMISSION STATUS UNSPECIFIED (MULTI): ICD-10-CM

## 2024-02-13 PROBLEM — M46.1 SACROILIITIS, NOT ELSEWHERE CLASSIFIED (CMS-HCC): Status: RESOLVED | Noted: 2023-06-30 | Resolved: 2024-02-13

## 2024-02-13 PROBLEM — I73.9 PAD (PERIPHERAL ARTERY DISEASE) (CMS-HCC): Status: RESOLVED | Noted: 2023-01-25 | Resolved: 2024-02-13

## 2024-02-13 PROCEDURE — 4010F ACE/ARB THERAPY RXD/TAKEN: CPT | Performed by: FAMILY MEDICINE

## 2024-02-13 PROCEDURE — 99443 PR PHYS/QHP TELEPHONE EVALUATION 21-30 MIN: CPT | Performed by: FAMILY MEDICINE

## 2024-02-13 PROCEDURE — 3079F DIAST BP 80-89 MM HG: CPT | Performed by: FAMILY MEDICINE

## 2024-02-13 PROCEDURE — 3075F SYST BP GE 130 - 139MM HG: CPT | Performed by: FAMILY MEDICINE

## 2024-02-13 PROCEDURE — 3008F BODY MASS INDEX DOCD: CPT | Performed by: FAMILY MEDICINE

## 2024-02-13 ASSESSMENT — ENCOUNTER SYMPTOMS
WHEEZING: 0
DIZZINESS: 0
NUMBNESS: 1
HYPERTENSION: 1
COUGH: 0

## 2024-02-13 NOTE — ASSESSMENT & PLAN NOTE
Stable based on symptoms and exam.  Continue established treatment plan and follow-up at least yearly  Continue to follow with specialist.  Currently takes buprenorphine/naloxone

## 2024-02-13 NOTE — PROGRESS NOTES
Subjective   Patient ID: Rachael Ruiz is a 58 y.o. female who presents for Hypertension.    Virtual or Telephone Consent    An interactive audio and video telecommunication system which permits real time communications between the patient (at the originating site) and provider (at the distant site) was utilized to provide this telehealth service.   Verbal consent was requested and obtained from Rachael Ruiz on this date, 02/13/24 for a telehealth visit.     Hypertension       Virtual or Telephone Consent    A telephone visit (audio only) between the patient (at the originating site) and the provider (at the distant site) was utilized to provide this telehealth service.   Verbal consent was requested and obtained from Rachael Ruiz on this date, 02/13/24 for a telehealth visit.       She initially was scheduled for her annual Medicare physical today, but was not able to come in.  We could not get video to work and today's visit was done over the telephone  Diabetes mellitus: Currently taking Trulicity 3 mg weekly and metformin 1000 mg.  She is supposed to be taking metformin 1000 mg twice daily, but often forgets to take one of her doses, and has only been taking 1 time per day.  She has been monitoring her glucose and most readings have been ranging from 1 20-1 30.  Her last A1c done in October 2023 was 7.2%  She currently takes gabapentin 800 mg 4 times per day for diabetic neuropathy.  Admits to paresthesias involving both feet to the level of the knees.  She is scheduled to establish with a new neurologist for this at the end of the month.  Denies any adverse effects with gabapentin including any drowsiness or dizziness.  She is now also noticing paresthesias in both hands, and is going to be discussing further with neurology  She has a history of hypertension currently taking spironolactone, losartan and HCTZ.  She is no longer taking hydralazine.  She has been monitoring her blood pressure out of the  office and it has been averaging 130/80  Takes p.o. vitamin B12 1000 mcg daily for B12 deficiency.  Her last B12 level done 10/12/2023 was greater than 2000  She is taking atorvastatin 80 mg daily for hyperlipidemia.  Last lipid panel done October 2023 showed total cholesterol 101 and LDL 43  She is still smoking  Has a history of COPD currently taking Spiriva and albuterol as needed.  She denies any recent COPD flareups, and typically takes albuterol every few days for symptoms  She mentions that she fell in October and landed on her buttocks area.  Since then she has had pain in her low back and also her left hip.  She has not had any imaging done since she fell.  Has continued to have pain in both her left hip, lateral aspect and also lower back.  She has a history of a laminectomy in April 2019 with chronic low back pain, and follows with orthopedics for this  She is also requesting referral for gynecology.  She noticed a bump in her genital area a few weeks ago and would like to see gynecology to discuss      Review of Systems   Respiratory:  Negative for cough and wheezing.    Neurological:  Positive for numbness. Negative for dizziness.       Objective   /80     Physical Exam not done due to today's visit being done over the telephone    Assessment/Plan   Problem List Items Addressed This Visit          Medium    Hyperlipidemia    Type 2 diabetes mellitus (CMS/[facility]) - Primary     Other Visit Diagnoses       Hypertension, unspecified type        Screening for colon cancer        Relevant Orders    Cologuard® colon cancer screening    Hip pain, unspecified laterality        Relevant Orders    XR hip left with pelvis when performed 4+ views    XR lumbar spine 2-3 views    Genital lesion, female        Relevant Orders    Referral to Gynecology        #1 diabetes mellitus: She reports that her home glucose readings have been at goal.  Continue Trulicity and metformin at current dose, and I recommended that  she follow-up in the office in the next 3 to 4 weeks for her annual wellness visit.  Will plan on obtaining a fingerstick A1c at that time  2.  Hypertension: Home BP readings have been at goal.  Continue spironolactone, losartan and hydralazine at current dose  3.  Hyperlipidemia: Last LDL was at goal.  Continue atorvastatin at current dose  4.  B12 deficiency: Last B12 level was in normal range.  Continue daily p.o. B12 1000 mcg  5.  COPD: She reports this has been stable.  Tobacco cessation recommended.  Continue Spiriva and albuterol as needed  She does report left hip and low back pain following a fall in October.  Since today's visit was done over the telephone, I was not able to examine her.  Will obtain an x-ray and I recommended that she follow-up with her orthopedic surgeon to discuss further  She is also requesting a referral to see gynecology for a bump in her genital area which has been present for last several weeks.  I was not able to examine the area since today's visit was done virtually.  Given referral to see gynecology  Recommended follow-up in the next 3 to 4 weeks for her annual wellness visit.  Will plan on obtaining A1c at that time  Today's visit was done over the telephone.  Total time approximately 25 minutes

## 2024-02-13 NOTE — ASSESSMENT & PLAN NOTE
Stable based on symptoms and exam.  Continue established treatment plan and follow-up at least yearly  Continue to follow with cardiology

## 2024-02-16 ENCOUNTER — TELEPHONE (OUTPATIENT)
Dept: PRIMARY CARE | Facility: CLINIC | Age: 59
End: 2024-02-16
Payer: MEDICARE

## 2024-02-16 DIAGNOSIS — M25.559 HIP PAIN, UNSPECIFIED LATERALITY: Primary | ICD-10-CM

## 2024-02-16 NOTE — TELEPHONE ENCOUNTER
Pt left vm. She state left hip and lumbar x-rays were placed, but its the right hip. She wants to get both sides x-rayed. Please advise.

## 2024-02-22 NOTE — TELEPHONE ENCOUNTER
Rx Refill Request Telephone Encounter    Name:  Rachael Ruiz  :  [id number]  Medication Name: Albuterol inhaler and Hydrochlorothiazide refill request  
Unknown if ever smoked

## 2024-03-08 ENCOUNTER — TELEPHONE (OUTPATIENT)
Dept: PHARMACY | Facility: HOSPITAL | Age: 59
End: 2024-03-08
Payer: MEDICARE

## 2024-03-08 NOTE — TELEPHONE ENCOUNTER
Population Health: Outreach by Ambulatory Pharmacy Team    Payor: Mandie CLEMENTE  Reason: Adherence  Medication: Losartan 100 mg, Metformin 500 mg , and Atorvastatin 80 mg  Outcome: Left Voicemail    Micah Elliott

## 2024-03-11 DIAGNOSIS — G25.81 RESTLESS LEGS SYNDROME: ICD-10-CM

## 2024-03-11 DIAGNOSIS — E11.42 TYPE 2 DIABETES MELLITUS WITH DIABETIC POLYNEUROPATHY, WITHOUT LONG-TERM CURRENT USE OF INSULIN (MULTI): ICD-10-CM

## 2024-03-11 RX ORDER — ROPINIROLE 1 MG/1
2 TABLET, FILM COATED ORAL NIGHTLY
Qty: 60 TABLET | Refills: 0 | Status: SHIPPED | OUTPATIENT
Start: 2024-03-11 | End: 2024-04-12

## 2024-03-11 RX ORDER — GABAPENTIN 800 MG/1
800 TABLET ORAL 4 TIMES DAILY
Qty: 72 TABLET | Refills: 0 | Status: SHIPPED | OUTPATIENT
Start: 2024-03-11 | End: 2024-03-14 | Stop reason: SDUPTHER

## 2024-03-14 DIAGNOSIS — E11.42 TYPE 2 DIABETES MELLITUS WITH DIABETIC POLYNEUROPATHY, WITHOUT LONG-TERM CURRENT USE OF INSULIN (MULTI): ICD-10-CM

## 2024-03-14 RX ORDER — GABAPENTIN 800 MG/1
800 TABLET ORAL 4 TIMES DAILY
Qty: 64 TABLET | Refills: 0 | Status: SHIPPED | OUTPATIENT
Start: 2024-03-14 | End: 2024-04-11 | Stop reason: SDUPTHER

## 2024-03-15 RX ORDER — GABAPENTIN 800 MG/1
800 TABLET ORAL 4 TIMES DAILY
Qty: 72 TABLET | Refills: 0 | OUTPATIENT
Start: 2024-03-15

## 2024-03-17 DIAGNOSIS — J44.9 CHRONIC OBSTRUCTIVE PULMONARY DISEASE, UNSPECIFIED COPD TYPE (MULTI): ICD-10-CM

## 2024-03-18 RX ORDER — ALBUTEROL SULFATE 90 UG/1
AEROSOL, METERED RESPIRATORY (INHALATION)
Qty: 18 G | Refills: 1 | Status: SHIPPED | OUTPATIENT
Start: 2024-03-18 | End: 2024-05-20

## 2024-03-20 ENCOUNTER — OFFICE VISIT (OUTPATIENT)
Dept: PRIMARY CARE | Facility: CLINIC | Age: 59
End: 2024-03-20
Payer: MEDICARE

## 2024-03-20 VITALS
WEIGHT: 155 LBS | DIASTOLIC BLOOD PRESSURE: 72 MMHG | BODY MASS INDEX: 30.43 KG/M2 | HEIGHT: 60 IN | HEART RATE: 77 BPM | OXYGEN SATURATION: 98 % | SYSTOLIC BLOOD PRESSURE: 114 MMHG

## 2024-03-20 DIAGNOSIS — E11.42 TYPE 2 DIABETES MELLITUS WITH DIABETIC POLYNEUROPATHY, WITHOUT LONG-TERM CURRENT USE OF INSULIN (MULTI): ICD-10-CM

## 2024-03-20 DIAGNOSIS — Z00.00 ROUTINE GENERAL MEDICAL EXAMINATION AT HEALTH CARE FACILITY: Primary | ICD-10-CM

## 2024-03-20 DIAGNOSIS — Z87.891 PERSONAL HISTORY OF NICOTINE DEPENDENCE: ICD-10-CM

## 2024-03-20 LAB — POC HEMOGLOBIN A1C: 6 % (ref 4.2–6.5)

## 2024-03-20 PROCEDURE — 3008F BODY MASS INDEX DOCD: CPT | Performed by: FAMILY MEDICINE

## 2024-03-20 PROCEDURE — 3078F DIAST BP <80 MM HG: CPT | Performed by: FAMILY MEDICINE

## 2024-03-20 PROCEDURE — 83036 HEMOGLOBIN GLYCOSYLATED A1C: CPT | Performed by: FAMILY MEDICINE

## 2024-03-20 PROCEDURE — G0439 PPPS, SUBSEQ VISIT: HCPCS | Performed by: FAMILY MEDICINE

## 2024-03-20 PROCEDURE — 4010F ACE/ARB THERAPY RXD/TAKEN: CPT | Performed by: FAMILY MEDICINE

## 2024-03-20 PROCEDURE — 3074F SYST BP LT 130 MM HG: CPT | Performed by: FAMILY MEDICINE

## 2024-03-20 RX ORDER — QUETIAPINE FUMARATE 25 MG/1
TABLET, FILM COATED ORAL
COMMUNITY
Start: 2024-03-13

## 2024-03-20 ASSESSMENT — ENCOUNTER SYMPTOMS
WHEEZING: 0
COUGH: 0
BACK PAIN: 1
ARTHRALGIAS: 1
FEVER: 0
NUMBNESS: 1
ABDOMINAL PAIN: 0

## 2024-03-20 ASSESSMENT — PATIENT HEALTH QUESTIONNAIRE - PHQ9
SUM OF ALL RESPONSES TO PHQ9 QUESTIONS 1 AND 2: 0
2. FEELING DOWN, DEPRESSED OR HOPELESS: NOT AT ALL
1. LITTLE INTEREST OR PLEASURE IN DOING THINGS: NOT AT ALL

## 2024-03-20 ASSESSMENT — ACTIVITIES OF DAILY LIVING (ADL)
MANAGING_FINANCES: NEEDS ASSISTANCE
GROCERY_SHOPPING: NEEDS ASSISTANCE
BATHING: NEEDS ASSISTANCE
DOING_HOUSEWORK: NEEDS ASSISTANCE
DRESSING: NEEDS ASSISTANCE
TAKING_MEDICATION: NEEDS ASSISTANCE

## 2024-03-20 NOTE — PROGRESS NOTES
Subjective   Patient ID: Rachael Ruiz is a 58 y.o. female who presents for Annual Exam, Diabetes, and Back Pain.    Diabetes  She presents for her follow-up diabetic visit. She has type 2 diabetes mellitus. There are no hypoglycemic associated symptoms. Pertinent negatives for diabetes include no chest pain. There are no hypoglycemic complications. She never participates in exercise. Her overall blood glucose range is 110-130 mg/dl. Eye exam is not current.   Back Pain  Episode onset: Multipe falls. Associated symptoms include numbness. Pertinent negatives include no abdominal pain, chest pain or fever.   She is here today for annual wellness visit and also follow-up  She is a current smoker.  She currently is smoking 1/2 pack/day, but has averaged closer to 1 pack/day x 40 years  Last mammogram was done September 2023.  This is category 1  Colon cancer screening: She had negative Cologuard 9/2021  Vaccinations: Up-to-date on Shingrix vaccine.  Tdap in 2021.  Received Prevnar 20 in 2023  She is currently taking Trulicity 3 mg weekly and metformin 1000 mg twice daily for diabetes mellitus.  She checks her glucose out of the office and typically ranges from 1 15-1 35  Currently takes gabapentin 800 mg 4 times per day for diabetic neuropathy.  She has had paresthesias and numbness involving both legs up to the level of her knees.  She is scheduled to establish with a new neurologist for this in the next 1 to 2 weeks  She is taking p.o. vitamin B12 1000 mcg daily for B12 deficiency.  Her last B12 level checked in October was in normal range  Takes atorvastatin 80 mg daily for hyperlipidemia.  Last LDL checked October 2023 was 43  Hypertension: Currently taking spironolactone, losartan and HCTZ  Has history of COPD currently taking Spiriva and albuterol as needed    She mentions that she had fallen back in October, and has had pain in her back since then.  Pain is located bilateral lower back and will radiate to both  hips.  Describes as a constant 10 out of 10 intensity pain.  This has not been improving.  She is currently in a wheelchair, but has also been using a walker at home.  She mentions that she has had several falls over the past 2 weeks  She has a history of chronic low back pain with a laminectomy April 2019.  She is planning on following up with her orthopedic surgeon, Dr. Coyle soon      Review of Systems   Constitutional:  Negative for fever.   Respiratory:  Negative for cough and wheezing.    Cardiovascular:  Negative for chest pain and leg swelling.   Gastrointestinal:  Negative for abdominal pain.   Musculoskeletal:  Positive for arthralgias and back pain.   Neurological:  Positive for numbness.   All other systems reviewed and are negative.      Objective   /72   Pulse 77   Ht 1.524 m (5')   Wt 70.3 kg (155 lb)   SpO2 98%   BMI 30.27 kg/m²     Physical Exam  Vitals reviewed.   Constitutional:       General: She is not in acute distress.     Appearance: Normal appearance. She is well-developed.   HENT:      Head: Normocephalic.      Right Ear: Tympanic membrane, ear canal and external ear normal.      Left Ear: Tympanic membrane, ear canal and external ear normal.      Nose: Nose normal.      Mouth/Throat:      Mouth: Mucous membranes are moist.   Eyes:      Conjunctiva/sclera: Conjunctivae normal.   Neck:      Thyroid: No thyromegaly.      Vascular: No JVD.   Cardiovascular:      Rate and Rhythm: Normal rate and regular rhythm.      Heart sounds: Normal heart sounds.   Pulmonary:      Effort: Pulmonary effort is normal.      Breath sounds: Normal breath sounds.   Abdominal:      Palpations: Abdomen is soft.      Tenderness: There is no abdominal tenderness.   Musculoskeletal:         General: Tenderness present. Normal range of motion.      Right lower leg: No edema.      Left lower leg: No edema.      Comments: Currently in wheelchair  Tenderness involving bilateral L4 and L5 lumbar paraspinals.   Hip flexion strength slightly decreased.  Due to her being in a wheelchair was difficult to do a full hip exam   Lymphadenopathy:      Cervical: No cervical adenopathy.   Skin:     Findings: No rash.   Neurological:      Mental Status: She is alert and oriented to person, place, and time.      Sensory: Sensory deficit (Decreased sensation both lower extremities up to level of knees) present.   Psychiatric:         Mood and Affect: Mood normal.         Behavior: Behavior normal.         Assessment/Plan   Problem List Items Addressed This Visit          Medium    Type 2 diabetes mellitus (CMS/[facility])    Relevant Orders    POCT glycosylated hemoglobin (Hb A1C) manually resulted (Completed)     Other Visit Diagnoses       Routine general medical examination at health care facility    -  Primary    Personal history of nicotine dependence        Relevant Orders    CT lung screening low dose        She is going to be due for her next low-dose lung cancer screening in June.  This is ordered today  Up-to-date on screening mammogram  She is going to be due for Cologuard this year.  This has already been ordered previously  Up-to-date on tetanus, pneumococcal and Shingrix vaccines  Tobacco cessation strongly recommended  Her A1c today is at goal at 6.0.  Continue Trulicity and metformin and follow-up in 3 to 4 months to recheck A1c  Hypertension: Blood pressure is at goal, continue current medications  Regarding her back pain which has been present following a fall in October-I did previously order an x-ray of her low back and both hips, and recommended that she get this done.  I offered a referral to physical therapy or falls clinic, but she declines at this time.  She is going to be contacting her orthopedic surgeon to schedule a follow-up, and will follow-up by phone once I have x-ray results  She will be establishing with neurology in the next 2 weeks for diabetic neuropathy  Follow-up in 3 to 4 months for recheck

## 2024-03-27 ENCOUNTER — TELEPHONE (OUTPATIENT)
Dept: PRIMARY CARE | Facility: CLINIC | Age: 59
End: 2024-03-27
Payer: MEDICARE

## 2024-03-27 DIAGNOSIS — G62.89 OTHER POLYNEUROPATHY: Primary | ICD-10-CM

## 2024-03-27 NOTE — TELEPHONE ENCOUNTER
Pt Neuro appt was canceled due to being dismissed by provider. Requesting new neuro referral to be placed. Please advise.

## 2024-04-01 DIAGNOSIS — E11.42 TYPE 2 DIABETES MELLITUS WITH DIABETIC POLYNEUROPATHY, WITHOUT LONG-TERM CURRENT USE OF INSULIN (MULTI): ICD-10-CM

## 2024-04-01 RX ORDER — GABAPENTIN 800 MG/1
800 TABLET ORAL 4 TIMES DAILY
Qty: 64 TABLET | Refills: 0 | OUTPATIENT
Start: 2024-04-01

## 2024-04-02 ENCOUNTER — TELEPHONE (OUTPATIENT)
Dept: PRIMARY CARE | Facility: CLINIC | Age: 59
End: 2024-04-02
Payer: MEDICARE

## 2024-04-02 NOTE — TELEPHONE ENCOUNTER
[employer]  Calling to inform you of Updated Plan of Care available in the patient portal  They would also like to invite you to do rounds with them. If you would like more information on this please call them at [phone]

## 2024-04-03 ENCOUNTER — APPOINTMENT (OUTPATIENT)
Dept: HEMATOLOGY/ONCOLOGY | Facility: CLINIC | Age: 59
End: 2024-04-03
Payer: MEDICARE

## 2024-04-05 ENCOUNTER — HOSPITAL ENCOUNTER (OUTPATIENT)
Dept: RADIOLOGY | Facility: HOSPITAL | Age: 59
Discharge: HOME | End: 2024-04-05
Payer: MEDICARE

## 2024-04-05 DIAGNOSIS — M25.559 HIP PAIN, UNSPECIFIED LATERALITY: ICD-10-CM

## 2024-04-05 PROCEDURE — 72100 X-RAY EXAM L-S SPINE 2/3 VWS: CPT | Performed by: RADIOLOGY

## 2024-04-05 PROCEDURE — 73522 X-RAY EXAM HIPS BI 3-4 VIEWS: CPT

## 2024-04-05 PROCEDURE — 72100 X-RAY EXAM L-S SPINE 2/3 VWS: CPT

## 2024-04-05 PROCEDURE — 73522 X-RAY EXAM HIPS BI 3-4 VIEWS: CPT | Mod: BILATERAL PROCEDURE | Performed by: RADIOLOGY

## 2024-04-06 ENCOUNTER — TELEPHONE (OUTPATIENT)
Dept: PRIMARY CARE | Facility: CLINIC | Age: 59
End: 2024-04-06
Payer: MEDICARE

## 2024-04-06 NOTE — TELEPHONE ENCOUNTER
Her lumbar spine x-ray shows endplate sclerosis and irregularity at L2-L3 which raises concern for underlying osteomyelitis.  I spoke to her over the phone.  She tells me that she fell a few weeks ago and has been having pain in her low back which has been progressively worsening over the past few weeks and can be severe at times.  No fever.  Given these imaging findings I recommended that she go to the ED today for further evaluation of this finding.  She agrees and will go to the ED

## 2024-04-07 ENCOUNTER — APPOINTMENT (OUTPATIENT)
Dept: RADIOLOGY | Facility: HOSPITAL | Age: 59
End: 2024-04-07
Payer: MEDICARE

## 2024-04-07 ENCOUNTER — HOSPITAL ENCOUNTER (EMERGENCY)
Facility: HOSPITAL | Age: 59
Discharge: HOME | End: 2024-04-08
Attending: EMERGENCY MEDICINE
Payer: MEDICARE

## 2024-04-07 DIAGNOSIS — G89.29 ACUTE EXACERBATION OF CHRONIC LOW BACK PAIN: Primary | ICD-10-CM

## 2024-04-07 DIAGNOSIS — M54.50 ACUTE EXACERBATION OF CHRONIC LOW BACK PAIN: Primary | ICD-10-CM

## 2024-04-07 LAB
ALBUMIN SERPL BCP-MCNC: 4.2 G/DL (ref 3.4–5)
ALP SERPL-CCNC: 90 U/L (ref 33–110)
ALT SERPL W P-5'-P-CCNC: 14 U/L (ref 7–45)
ANION GAP SERPL CALC-SCNC: 11 MMOL/L (ref 10–20)
AST SERPL W P-5'-P-CCNC: 14 U/L (ref 9–39)
BASOPHILS # BLD AUTO: 0.06 X10*3/UL (ref 0–0.1)
BASOPHILS NFR BLD AUTO: 0.6 %
BILIRUB SERPL-MCNC: 0.3 MG/DL (ref 0–1.2)
BUN SERPL-MCNC: 14 MG/DL (ref 6–23)
CALCIUM SERPL-MCNC: 9.7 MG/DL (ref 8.6–10.3)
CARDIAC TROPONIN I PNL SERPL HS: 3 NG/L (ref 0–13)
CARDIAC TROPONIN I PNL SERPL HS: 4 NG/L (ref 0–13)
CHLORIDE SERPL-SCNC: 103 MMOL/L (ref 98–107)
CO2 SERPL-SCNC: 27 MMOL/L (ref 21–32)
CREAT SERPL-MCNC: 0.77 MG/DL (ref 0.5–1.05)
CRP SERPL-MCNC: 0.39 MG/DL
EGFRCR SERPLBLD CKD-EPI 2021: 89 ML/MIN/1.73M*2
EOSINOPHIL # BLD AUTO: 0.33 X10*3/UL (ref 0–0.7)
EOSINOPHIL NFR BLD AUTO: 3.4 %
ERYTHROCYTE [DISTWIDTH] IN BLOOD BY AUTOMATED COUNT: 13.2 % (ref 11.5–14.5)
ERYTHROCYTE [SEDIMENTATION RATE] IN BLOOD BY WESTERGREN METHOD: 37 MM/H (ref 0–30)
GLUCOSE SERPL-MCNC: 114 MG/DL (ref 74–99)
HCT VFR BLD AUTO: 33 % (ref 36–46)
HGB BLD-MCNC: 11.5 G/DL (ref 12–16)
IMM GRANULOCYTES # BLD AUTO: 0.03 X10*3/UL (ref 0–0.7)
IMM GRANULOCYTES NFR BLD AUTO: 0.3 % (ref 0–0.9)
INR PPP: 1.1 (ref 0.9–1.1)
LYMPHOCYTES # BLD AUTO: 2.91 X10*3/UL (ref 1.2–4.8)
LYMPHOCYTES NFR BLD AUTO: 29.9 %
MAGNESIUM SERPL-MCNC: 1.44 MG/DL (ref 1.6–2.4)
MCH RBC QN AUTO: 32.7 PG (ref 26–34)
MCHC RBC AUTO-ENTMCNC: 34.8 G/DL (ref 32–36)
MCV RBC AUTO: 94 FL (ref 80–100)
MONOCYTES # BLD AUTO: 0.71 X10*3/UL (ref 0.1–1)
MONOCYTES NFR BLD AUTO: 7.3 %
NEUTROPHILS # BLD AUTO: 5.7 X10*3/UL (ref 1.2–7.7)
NEUTROPHILS NFR BLD AUTO: 58.5 %
NRBC BLD-RTO: 0 /100 WBCS (ref 0–0)
PLATELET # BLD AUTO: 292 X10*3/UL (ref 150–450)
POTASSIUM SERPL-SCNC: 3.1 MMOL/L (ref 3.5–5.3)
PROT SERPL-MCNC: 7.1 G/DL (ref 6.4–8.2)
PROTHROMBIN TIME: 11.9 SECONDS (ref 9.8–12.8)
RBC # BLD AUTO: 3.52 X10*6/UL (ref 4–5.2)
SODIUM SERPL-SCNC: 138 MMOL/L (ref 136–145)
WBC # BLD AUTO: 9.7 X10*3/UL (ref 4.4–11.3)

## 2024-04-07 PROCEDURE — 84484 ASSAY OF TROPONIN QUANT: CPT | Performed by: PHYSICIAN ASSISTANT

## 2024-04-07 PROCEDURE — 96365 THER/PROPH/DIAG IV INF INIT: CPT | Performed by: EMERGENCY MEDICINE

## 2024-04-07 PROCEDURE — 96361 HYDRATE IV INFUSION ADD-ON: CPT | Performed by: EMERGENCY MEDICINE

## 2024-04-07 PROCEDURE — 72131 CT LUMBAR SPINE W/O DYE: CPT | Mod: FOREIGN READ | Performed by: RADIOLOGY

## 2024-04-07 PROCEDURE — 99285 EMERGENCY DEPT VISIT HI MDM: CPT | Mod: 25 | Performed by: EMERGENCY MEDICINE

## 2024-04-07 PROCEDURE — 85652 RBC SED RATE AUTOMATED: CPT | Performed by: PHYSICIAN ASSISTANT

## 2024-04-07 PROCEDURE — 72128 CT CHEST SPINE W/O DYE: CPT

## 2024-04-07 PROCEDURE — 2500000001 HC RX 250 WO HCPCS SELF ADMINISTERED DRUGS (ALT 637 FOR MEDICARE OP): Performed by: PHYSICIAN ASSISTANT

## 2024-04-07 PROCEDURE — 72128 CT CHEST SPINE W/O DYE: CPT | Mod: FOREIGN READ | Performed by: RADIOLOGY

## 2024-04-07 PROCEDURE — 80053 COMPREHEN METABOLIC PANEL: CPT | Performed by: PHYSICIAN ASSISTANT

## 2024-04-07 PROCEDURE — 36415 COLL VENOUS BLD VENIPUNCTURE: CPT | Performed by: PHYSICIAN ASSISTANT

## 2024-04-07 PROCEDURE — 2500000004 HC RX 250 GENERAL PHARMACY W/ HCPCS (ALT 636 FOR OP/ED): Performed by: EMERGENCY MEDICINE

## 2024-04-07 PROCEDURE — 72131 CT LUMBAR SPINE W/O DYE: CPT

## 2024-04-07 PROCEDURE — 2500000004 HC RX 250 GENERAL PHARMACY W/ HCPCS (ALT 636 FOR OP/ED): Performed by: PHYSICIAN ASSISTANT

## 2024-04-07 PROCEDURE — 86140 C-REACTIVE PROTEIN: CPT | Performed by: PHYSICIAN ASSISTANT

## 2024-04-07 PROCEDURE — 70450 CT HEAD/BRAIN W/O DYE: CPT

## 2024-04-07 PROCEDURE — 85610 PROTHROMBIN TIME: CPT | Performed by: PHYSICIAN ASSISTANT

## 2024-04-07 PROCEDURE — 85025 COMPLETE CBC W/AUTO DIFF WBC: CPT | Performed by: PHYSICIAN ASSISTANT

## 2024-04-07 PROCEDURE — 72125 CT NECK SPINE W/O DYE: CPT | Performed by: STUDENT IN AN ORGANIZED HEALTH CARE EDUCATION/TRAINING PROGRAM

## 2024-04-07 PROCEDURE — 72125 CT NECK SPINE W/O DYE: CPT

## 2024-04-07 PROCEDURE — 70450 CT HEAD/BRAIN W/O DYE: CPT | Performed by: STUDENT IN AN ORGANIZED HEALTH CARE EDUCATION/TRAINING PROGRAM

## 2024-04-07 PROCEDURE — 83735 ASSAY OF MAGNESIUM: CPT | Performed by: PHYSICIAN ASSISTANT

## 2024-04-07 PROCEDURE — 2500000002 HC RX 250 W HCPCS SELF ADMINISTERED DRUGS (ALT 637 FOR MEDICARE OP, ALT 636 FOR OP/ED): Performed by: EMERGENCY MEDICINE

## 2024-04-07 RX ORDER — ACETAMINOPHEN 325 MG/1
975 TABLET ORAL ONCE
Status: COMPLETED | OUTPATIENT
Start: 2024-04-07 | End: 2024-04-07

## 2024-04-07 RX ORDER — MAGNESIUM SULFATE HEPTAHYDRATE 40 MG/ML
2 INJECTION, SOLUTION INTRAVENOUS ONCE
Status: COMPLETED | OUTPATIENT
Start: 2024-04-07 | End: 2024-04-07

## 2024-04-07 RX ORDER — ALPRAZOLAM 0.5 MG/1
0.5 TABLET ORAL ONCE
Status: COMPLETED | OUTPATIENT
Start: 2024-04-07 | End: 2024-04-07

## 2024-04-07 RX ORDER — SODIUM CHLORIDE 9 MG/ML
125 INJECTION, SOLUTION INTRAVENOUS CONTINUOUS
Status: DISCONTINUED | OUTPATIENT
Start: 2024-04-07 | End: 2024-04-08 | Stop reason: HOSPADM

## 2024-04-07 RX ORDER — POTASSIUM CHLORIDE 20 MEQ/1
40 TABLET, EXTENDED RELEASE ORAL ONCE
Status: COMPLETED | OUTPATIENT
Start: 2024-04-07 | End: 2024-04-07

## 2024-04-07 RX ORDER — LIDOCAINE 560 MG/1
2 PATCH PERCUTANEOUS; TOPICAL; TRANSDERMAL ONCE
Status: DISCONTINUED | OUTPATIENT
Start: 2024-04-07 | End: 2024-04-08 | Stop reason: HOSPADM

## 2024-04-07 RX ADMIN — ACETAMINOPHEN 975 MG: 325 TABLET ORAL at 21:57

## 2024-04-07 RX ADMIN — SODIUM CHLORIDE 125 ML/HR: 9 INJECTION, SOLUTION INTRAVENOUS at 21:57

## 2024-04-07 RX ADMIN — POTASSIUM CHLORIDE 40 MEQ: 1500 TABLET, EXTENDED RELEASE ORAL at 23:03

## 2024-04-07 RX ADMIN — ALPRAZOLAM 0.5 MG: 0.5 TABLET ORAL at 21:57

## 2024-04-07 RX ADMIN — MAGNESIUM SULFATE HEPTAHYDRATE 2 G: 40 INJECTION, SOLUTION INTRAVENOUS at 23:03

## 2024-04-07 ASSESSMENT — COLUMBIA-SUICIDE SEVERITY RATING SCALE - C-SSRS
2. HAVE YOU ACTUALLY HAD ANY THOUGHTS OF KILLING YOURSELF?: NO
6. HAVE YOU EVER DONE ANYTHING, STARTED TO DO ANYTHING, OR PREPARED TO DO ANYTHING TO END YOUR LIFE?: NO
1. IN THE PAST MONTH, HAVE YOU WISHED YOU WERE DEAD OR WISHED YOU COULD GO TO SLEEP AND NOT WAKE UP?: NO

## 2024-04-07 ASSESSMENT — PAIN DESCRIPTION - ORIENTATION: ORIENTATION: LOWER

## 2024-04-07 ASSESSMENT — LIFESTYLE VARIABLES
EVER FELT BAD OR GUILTY ABOUT YOUR DRINKING: NO
EVER HAD A DRINK FIRST THING IN THE MORNING TO STEADY YOUR NERVES TO GET RID OF A HANGOVER: NO
TOTAL SCORE: 0
HAVE YOU EVER FELT YOU SHOULD CUT DOWN ON YOUR DRINKING: NO
HAVE PEOPLE ANNOYED YOU BY CRITICIZING YOUR DRINKING: NO

## 2024-04-07 ASSESSMENT — PAIN DESCRIPTION - LOCATION: LOCATION: BACK

## 2024-04-07 ASSESSMENT — PAIN SCALES - GENERAL: PAINLEVEL_OUTOF10: 8

## 2024-04-07 ASSESSMENT — PAIN DESCRIPTION - DESCRIPTORS: DESCRIPTORS: BURNING

## 2024-04-07 ASSESSMENT — PAIN DESCRIPTION - PAIN TYPE: TYPE: ACUTE PAIN

## 2024-04-07 ASSESSMENT — PAIN - FUNCTIONAL ASSESSMENT: PAIN_FUNCTIONAL_ASSESSMENT: 0-10

## 2024-04-08 ENCOUNTER — APPOINTMENT (OUTPATIENT)
Dept: CARDIOLOGY | Facility: HOSPITAL | Age: 59
End: 2024-04-08
Payer: MEDICARE

## 2024-04-08 VITALS
WEIGHT: 152 LBS | DIASTOLIC BLOOD PRESSURE: 75 MMHG | TEMPERATURE: 96.8 F | SYSTOLIC BLOOD PRESSURE: 121 MMHG | OXYGEN SATURATION: 98 % | HEIGHT: 59 IN | HEART RATE: 81 BPM | BODY MASS INDEX: 30.64 KG/M2 | RESPIRATION RATE: 18 BRPM

## 2024-04-08 LAB
ATRIAL RATE: 66 BPM
P AXIS: 50 DEGREES
P OFFSET: 197 MS
P ONSET: 134 MS
PR INTERVAL: 168 MS
Q ONSET: 218 MS
QRS COUNT: 11 BEATS
QRS DURATION: 100 MS
QT INTERVAL: 432 MS
QTC CALCULATION(BAZETT): 452 MS
QTC FREDERICIA: 446 MS
R AXIS: 60 DEGREES
T AXIS: 61 DEGREES
T OFFSET: 434 MS
VENTRICULAR RATE: 66 BPM

## 2024-04-08 PROCEDURE — 93005 ELECTROCARDIOGRAM TRACING: CPT

## 2024-04-08 RX ORDER — LIDOCAINE 50 MG/G
1 PATCH TOPICAL DAILY
Qty: 10 PATCH | Refills: 0 | Status: SHIPPED | OUTPATIENT
Start: 2024-04-08

## 2024-04-08 RX ORDER — METHOCARBAMOL 500 MG/1
500 TABLET, FILM COATED ORAL 3 TIMES DAILY
Qty: 21 TABLET | Refills: 0 | Status: SHIPPED | OUTPATIENT
Start: 2024-04-08 | End: 2024-04-24

## 2024-04-08 NOTE — ED PROVIDER NOTES
"HPI   Chief Complaint   Patient presents with   • Back Pain     \"My doctor thinks I have osteomyelitis and told me to come in.\"       Patient is a very pleasant 58-year-old female who presents emergency room with chief complaint of acute exacerbation of chronic back pain.  The patient states that she has chronic back pain and takes gabapentin and dose of Suboxone.  For the past couple weeks she has noticed increased low back pain most notably with ambulation.  The pain is constant is a sharp stabbing pain is localized to the mid lower lumbar region.  The patient states she fell a few weeks ago and had outpatient x-rays done of the lumbar spine she has been psychiatrically for.  She states that she spoke with her primary care physician yesterday and the x-rays showed endplate sclerosis irregularity L2-L3 which raises some concerns for underlying osteomyelitis.  Patient states she was told to go to the ER by her PCP.  Patient denies any fevers, chills, diaphoresis, weakness to her extremities, bladder or bowel dysfunction or saddle paresthesias, any history of IV drug use, alcohol abuse or recent epidural injections.  Currently rates her back pain a 10 out of 10 worse with weightbearing and movement without any alleviating factors.  She denies any chest pain, palpitations, cough, hemoptysis, weight loss or night sweats.      History provided by:  Patient and medical records                      Sharps Coma Scale Score: 15                     Patient History   Past Medical History:   Diagnosis Date   • Allergic 2017   • Anxiety 1985   • Arthritis 2017   • Cataract 2018   • COPD (chronic obstructive pulmonary disease) (CMS/[facility]) 2017   • Depression 1985   • Diabetes mellitus (CMS/[facility]) 1986   • HL (hearing loss) 1998   • Hypertension 1986   • Personal history of nicotine dependence 08/12/2021    Personal history of nicotine dependence   • Substance abuse (CMS/[facility]) 2015   • Visual impairment 1970     Past Surgical " "History:   Procedure Laterality Date   • BACK SURGERY  2017   • BREAST BIOPSY     • EYE SURGERY  2019   • HYSTERECTOMY  1990   • OTHER SURGICAL HISTORY  07/10/2019    Tonsillectomy   • OTHER SURGICAL HISTORY  07/10/2019    Back surgery   • OTHER SURGICAL HISTORY  07/10/2019    Hysterectomy   • OTHER SURGICAL HISTORY  07/10/2019    Carpal tunnel surgery   • OTHER SURGICAL HISTORY  01/11/2022    Throat surgery   • OTHER SURGICAL HISTORY  01/11/2022    Knee surgery   • OTHER SURGICAL HISTORY  01/11/2022    Breast surgery   • OTHER SURGICAL HISTORY  01/11/2022    Cataract surgery   • OTHER SURGICAL HISTORY  01/11/2022    Toe fracture repair   • XR CHEST PACEMAKER WITH FLUORO  04/15/2019    XR CHEST PACEMAKER WITH FLUORO 4/15/2019 [facility]     Family History   Adopted: Yes   Problem Relation Name Age of Onset   • Skin cancer Mother     • Other (cardiac disorder) Father Julio C Storey (biological parent)    • Alcohol abuse Father Julio C Storey (biological parent)    • Diabetes Father Julio C Storey (biological parent)    • Depression Father's Sister Valerie Alejandre    • Diabetes Father's Sister Valerie Greenfield    • Hypertension Father's Sister Valerie Greenfield    • Ovarian cancer Sister Nhi Rodriguez      Social History     Tobacco Use   • Smoking status: Every Day     Packs/day: 0.50     Years: 46.00     Additional pack years: 0.00     Total pack years: 23.00     Types: Cigarettes     Start date: 8/14/1974   • Smokeless tobacco: Never   • Tobacco comments:     I was introduced to cigarettes in 5th grade and got hooked really, really quick. 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   • Drug use: Not Currently     Types: \"Crack\" cocaine, Cocaine, Hydrocodone, Marijuana, Morphine, Oxycodone     Comment: I was addicted to more than one substance back " then. Clean 7       Physical Exam   ED Triage Vitals [04/07/24 2036]   Temperature Heart Rate Respirations BP   36 °C (96.8 °F) 80 18 110/72      Pulse Ox Temp Source Heart Rate Source Patient Position   96 % Temporal Monitor Sitting      BP Location FiO2 (%)     Right arm --       Physical Exam  Vitals and nursing note reviewed. Exam conducted with a chaperone present.   Constitutional:       General: She is awake. She is not in acute distress.     Appearance: Normal appearance. She is well-developed, well-groomed and overweight. She is not ill-appearing, toxic-appearing or diaphoretic.   HENT:      Head: Normocephalic and atraumatic.      Right Ear: Tympanic membrane, ear canal and external ear normal.      Left Ear: Tympanic membrane, ear canal and external ear normal.      Nose: Nose normal.      Mouth/Throat:      Mouth: Mucous membranes are moist.      Pharynx: Oropharynx is clear.   Eyes:      General: No scleral icterus.     Conjunctiva/sclera: Conjunctivae normal.      Pupils: Pupils are equal, round, and reactive to light.   Neck:      Vascular: No carotid bruit.   Cardiovascular:      Rate and Rhythm: Normal rate and regular rhythm.      Pulses: Normal pulses.      Heart sounds: Normal heart sounds.   Pulmonary:      Effort: Pulmonary effort is normal. No respiratory distress.      Breath sounds: Normal breath sounds. No stridor. No wheezing, rhonchi or rales.   Chest:      Chest wall: No tenderness.   Abdominal:      General: Bowel sounds are normal. There is no distension.      Palpations: Abdomen is soft. There is no mass.      Tenderness: There is no abdominal tenderness. There is no right CVA tenderness, left CVA tenderness, guarding or rebound.      Hernia: No hernia is present.   Musculoskeletal:         General: Tenderness present.      Right shoulder: Normal.      Left shoulder: Normal.      Right upper arm: Normal.      Left upper arm: Normal.      Right elbow: Normal.      Left elbow: Normal.       Right forearm: Normal.      Left forearm: Normal.      Right wrist: Normal.      Left wrist: Normal.      Right hand: Normal.      Left hand: Normal.      Cervical back: Normal, normal range of motion and neck supple. No rigidity or tenderness.      Thoracic back: Normal.      Lumbar back: Spasms and tenderness present. Decreased range of motion.        Back:       Comments: Midline scar lumbar region, no erythema, swelling, bruising or ecchymosis, no sign of abrasions or trauma, limited flexion extension, there is some tenderness to palpation over the paralumbar spinal muscles.   Lymphadenopathy:      Cervical: No cervical adenopathy.   Skin:     General: Skin is warm and dry.      Capillary Refill: Capillary refill takes less than 2 seconds.   Neurological:      General: No focal deficit present.      Mental Status: She is alert and oriented to person, place, and time. Mental status is at baseline.      GCS: GCS eye subscore is 4. GCS verbal subscore is 5. GCS motor subscore is 6.      Cranial Nerves: Cranial nerves 2-12 are intact. No cranial nerve deficit.      Sensory: Sensory deficit present.      Motor: Motor function is intact. No weakness, tremor, atrophy or abnormal muscle tone.      Coordination: Coordination is intact.      Comments: She has chronic diabetic neuropathy of her lower extremities   Psychiatric:         Mood and Affect: Mood normal.         Behavior: Behavior normal. Behavior is cooperative.         Thought Content: Thought content normal.         Judgment: Judgment normal.         ED Course & MDM   Diagnoses as of 04/08/24 0105   Acute exacerbation of chronic low back pain       Medical Decision Making  Temperature is 36 heart rate 80 respirations 18 /73 pulse ox is 96% on room air  I have ordered lab work and a noncontrast CT scan of the head, cervical, thoracic, lumbar spine due to the patient's complaint of falling 2 weeks ago.  EKG interpreted by me at 0033 hrs. normal sinus  rhythm rate 66, TN was 168 QRS was 100  QTc was 452 no ischemic changes.  CBC white count is 9.7 hemoglobin 0.5 Topete crit 33 platelet count was 292, metabolic potassium was 3.1 glucose was 114 magnesium was 1.44.  The patient was medicated with IV magnesium and also received potassium 40 mEq orally.  Sed rate is 37, CRP is normal 0.39 first troponin was 4 repeat was 3.  CT scan of the head shows no acute intracranial normality or fracture, partial empty sella which can be incidental or associated with idiopathic intracranial hypertension.  CT scan of the cervical spine shows no acute fracture or traumatic subluxation, there is multilevel degenerative disc disease most noted C3-C4, C4-C5 with moderate to severe C5-C6 and C6/C7.  There is also noted foraminal stenosis bilateral C3-C5 and C5-C6, severe right mild left C6-C7 foraminal stenosis.  CT scan of the thoracic spine shows no acute osseous abnormality, there is postsurgical changes of the lumbar spine with severe spondylolysis of the lumbar spine spinal stenosis at L2-3 and L3-4, the vertebral body heights are maintained, there is hypertrophy of the facets noted throughout the lumbar spine to varying degrees.  The paravertebral soft tissues are within normal limits extensive calcific atherosclerotic disease is seen.  I did do a rectal exam she has good rectal tone and she remains neurologically intact.  There is no sign of any urinary retention and she denies any bladder or bowel dysfunction or saddle paresthesias.  At this point time with a normal white count I do not suspect osteomyelitis, there is no sign of any acute spinal fractures, I do not suspect cauda equina syndrome epidural abscess or AAA.  The patient states that she has actually been having this pain since last October.  The patient's vital signs remained stable BP is 118/76 heart rate 82 respirations 18 pulse ox is 97% room air.  The patient is able to ambulate, I do not feel the patient  requires admission or further workup at this time.  The patient was discharged home with prescription for Robaxin, topical lidocaine patches, potassium supplementation and recommend [facility].  The patient verbalizes understanding agreement the plan.  She was encouraged to return with any concerns or worsening of symptoms.        Procedure  Procedures     Eddie Urena PA-C  04/08/24 0105

## 2024-04-09 ENCOUNTER — APPOINTMENT (OUTPATIENT)
Dept: ORTHOPEDIC SURGERY | Facility: CLINIC | Age: 59
End: 2024-04-09
Payer: MEDICARE

## 2024-04-11 DIAGNOSIS — E11.42 TYPE 2 DIABETES MELLITUS WITH DIABETIC POLYNEUROPATHY, WITHOUT LONG-TERM CURRENT USE OF INSULIN (MULTI): ICD-10-CM

## 2024-04-11 DIAGNOSIS — G25.81 RESTLESS LEGS SYNDROME: ICD-10-CM

## 2024-04-11 RX ORDER — GABAPENTIN 800 MG/1
800 TABLET ORAL 4 TIMES DAILY
Qty: 120 TABLET | Refills: 2 | Status: SHIPPED | OUTPATIENT
Start: 2024-04-11

## 2024-04-12 RX ORDER — ROPINIROLE 1 MG/1
2 TABLET, FILM COATED ORAL NIGHTLY
Qty: 60 TABLET | Refills: 3 | Status: SHIPPED | OUTPATIENT
Start: 2024-04-12

## 2024-04-16 ENCOUNTER — TELEMEDICINE (OUTPATIENT)
Dept: PRIMARY CARE | Facility: CLINIC | Age: 59
End: 2024-04-16
Payer: MEDICARE

## 2024-04-16 ENCOUNTER — OFFICE VISIT (OUTPATIENT)
Dept: ORTHOPEDIC SURGERY | Facility: CLINIC | Age: 59
End: 2024-04-16
Payer: MEDICARE

## 2024-04-16 ENCOUNTER — HOSPITAL ENCOUNTER (OUTPATIENT)
Dept: RADIOLOGY | Facility: CLINIC | Age: 59
Discharge: HOME | End: 2024-04-16
Payer: MEDICARE

## 2024-04-16 ENCOUNTER — TELEPHONE (OUTPATIENT)
Dept: PRIMARY CARE | Facility: CLINIC | Age: 59
End: 2024-04-16

## 2024-04-16 DIAGNOSIS — I10 ESSENTIAL HYPERTENSION, BENIGN: ICD-10-CM

## 2024-04-16 DIAGNOSIS — G56.01 CARPAL TUNNEL SYNDROME OF RIGHT WRIST: Primary | ICD-10-CM

## 2024-04-16 DIAGNOSIS — G56.03 BILATERAL CARPAL TUNNEL SYNDROME: ICD-10-CM

## 2024-04-16 DIAGNOSIS — E87.6 HYPOKALEMIA: ICD-10-CM

## 2024-04-16 DIAGNOSIS — E83.42 HYPOMAGNESEMIA: ICD-10-CM

## 2024-04-16 DIAGNOSIS — M54.12 CERVICAL RADICULOPATHY: ICD-10-CM

## 2024-04-16 DIAGNOSIS — J30.89 NON-SEASONAL ALLERGIC RHINITIS DUE TO OTHER ALLERGIC TRIGGER: ICD-10-CM

## 2024-04-16 DIAGNOSIS — M47.816 LUMBAR SPONDYLOSIS: Primary | ICD-10-CM

## 2024-04-16 DIAGNOSIS — M25.531 RIGHT WRIST PAIN: ICD-10-CM

## 2024-04-16 PROCEDURE — 4004F PT TOBACCO SCREEN RCVD TLK: CPT | Performed by: ORTHOPAEDIC SURGERY

## 2024-04-16 PROCEDURE — 4010F ACE/ARB THERAPY RXD/TAKEN: CPT | Performed by: FAMILY MEDICINE

## 2024-04-16 PROCEDURE — 3008F BODY MASS INDEX DOCD: CPT | Performed by: ORTHOPAEDIC SURGERY

## 2024-04-16 PROCEDURE — 4004F PT TOBACCO SCREEN RCVD TLK: CPT | Performed by: FAMILY MEDICINE

## 2024-04-16 PROCEDURE — 73110 X-RAY EXAM OF WRIST: CPT | Mod: RIGHT SIDE | Performed by: ORTHOPAEDIC SURGERY

## 2024-04-16 PROCEDURE — 2500000005 HC RX 250 GENERAL PHARMACY W/O HCPCS: Performed by: ORTHOPAEDIC SURGERY

## 2024-04-16 PROCEDURE — 2500000004 HC RX 250 GENERAL PHARMACY W/ HCPCS (ALT 636 FOR OP/ED): Performed by: ORTHOPAEDIC SURGERY

## 2024-04-16 PROCEDURE — 3008F BODY MASS INDEX DOCD: CPT | Performed by: FAMILY MEDICINE

## 2024-04-16 PROCEDURE — 4010F ACE/ARB THERAPY RXD/TAKEN: CPT | Performed by: ORTHOPAEDIC SURGERY

## 2024-04-16 PROCEDURE — 99214 OFFICE O/P EST MOD 30 MIN: CPT | Performed by: ORTHOPAEDIC SURGERY

## 2024-04-16 PROCEDURE — 20526 THER INJECTION CARP TUNNEL: CPT | Mod: 50 | Performed by: ORTHOPAEDIC SURGERY

## 2024-04-16 PROCEDURE — 73110 X-RAY EXAM OF WRIST: CPT | Mod: RT

## 2024-04-16 PROCEDURE — 99442 PR PHYS/QHP TELEPHONE EVALUATION 11-20 MIN: CPT | Performed by: FAMILY MEDICINE

## 2024-04-16 RX ORDER — SPIRONOLACTONE 25 MG/1
25 TABLET ORAL DAILY
Qty: 90 TABLET | Refills: 3 | Status: CANCELLED | OUTPATIENT
Start: 2024-04-16 | End: 2025-04-16

## 2024-04-16 RX ORDER — LIDOCAINE HYDROCHLORIDE 10 MG/ML
1 INJECTION INFILTRATION; PERINEURAL
Status: COMPLETED | OUTPATIENT
Start: 2024-04-16 | End: 2024-04-16

## 2024-04-16 RX ORDER — FLUTICASONE PROPIONATE 50 MCG
2 SPRAY, SUSPENSION (ML) NASAL DAILY
Qty: 16 G | Refills: 1 | Status: SHIPPED | OUTPATIENT
Start: 2024-04-16 | End: 2024-05-20

## 2024-04-16 RX ADMIN — LIDOCAINE HYDROCHLORIDE 1 ML: 10 INJECTION, SOLUTION INFILTRATION; PERINEURAL at 15:09

## 2024-04-16 RX ADMIN — TRIAMCINOLONE ACETONIDE 10 MG: 10 INJECTION, SUSPENSION INTRA-ARTICULAR; INTRALESIONAL at 15:09

## 2024-04-16 ASSESSMENT — ENCOUNTER SYMPTOMS
BACK PAIN: 1
NUMBNESS: 1

## 2024-04-16 NOTE — PROGRESS NOTES
Hand / UE Inj/Asp: bilateral carpal tunnel for carpal tunnel syndrome on 4/16/2024 3:09 PM  Indications: diagnostic and therapeutic  Details: 25 G needle, volar approach  Medications (Right): 10 mg triamcinolone acetonide 10 mg/mL; 1 mL lidocaine 10 mg/mL (1 %)  Medications (Left): 10 mg triamcinolone acetonide 10 mg/mL; 1 mL lidocaine 10 mg/mL (1 %)  Outcome: tolerated well, no immediate complications    Bilateral Carpal Tunnel Injection: It was explained to the patient that the risks of a steroid injection include but are not limited to infection, local skin irritation, skin atrophy, calcification, continued pain and discomfort, elevated blood sugar, burning, failure to relieve pain, and possible late infection. The patient verbalized good insight and verbalized consent for the injection. It was further explained that the post-injection discomfort can be alleviated with additional medications, ice, elevation, and rest over the first 24 hours, and that these modalities are recommended.  After informed consent was provided, patient identification was confirmed, and allergies were verified, the patient was appropriately positioned. The site was marked and time-out performed.    Using aseptic technique, a solution containing 1 mL of 10 mg of Kenalog and 1 mL of 1% lidocaine without epinephrine was injected into the patient's right carpal tunnel. A 25-gauge needle was inserted just ulnar to the anatomic course of the palmaris longus tendon at the level of the distal wrist flexion crease. It was slowly advanced deep to the distal antebrachial fascia. The solution was then injected slowly, taking care to ensure that the patient experienced no paresthesias during the injection of the solution. After injection of the solution, the patient was asked to perform active flexion and extension of the digits and wrist to help disperse the solution. A band-aid was then placed at the injection site. The patient tolerated this right  carpal tunnel injection well.      Using aseptic technique, a solution containing 1 mL of 10 mg of Kenalog and 1 mL of 1% lidocaine without epinephrine was injected into the patient's left carpal tunnel. A 25-gauge needle was inserted just ulnar to the anatomic course of the palmaris longus tendon at the level of the distal wrist flexion crease. It was slowly advanced deep to the distal antebrachial fascia. The solution was then injected slowly, taking care to ensure that the patient experienced no paresthesias during the injection of the solution. After injection of the solution, the patient was asked to perform active flexion and extension of the digits and wrist to help disperse the solution. A band-aid was then placed at the injection site. The patient tolerated this left carpal tunnel injection well.    Procedure, treatment alternatives, risks and benefits explained, specific risks discussed. Consent was given by the patient. Immediately prior to procedure a time out was called to verify the correct patient, procedure, equipment, support staff and site/side marked as required. Patient was prepped and draped in the usual sterile fashion.

## 2024-04-16 NOTE — TELEPHONE ENCOUNTER
Patient is  having a hard time with her hands and is having hard time this morning. She is wondering if she can switch her 1140 to a virtual     Please Advise    [phone]

## 2024-04-16 NOTE — PROGRESS NOTES
History present illness: Patient presents today for evaluation of numbness and tingling affecting the bilateral hands.  Patient is status post bilateral carpal tunnel release.  She describes a sandpaper type feel to median nerve distribution to the right hand.  She has history of cervical spine disease.      Past medical history: The patient's past medical history, family history, social history, and review of systems were documented on the patient medical intake.  The updated data was reviewed in the electronic medical record.  History is negative except otherwise stated in history of present illness.        Physical examination:  General: Alert and oriented to person, place, and time.  No acute distress and breathing comfortably: Pleasant and cooperative with examination.  HEENT: Head is normocephalic and atraumatic.  Neck: Supple, no visible swelling.  Cardiovascular: No palpable tachycardia  Lungs: No audible wheezing or labored breathing  Abdomen: Nondistended.  Extremities: Evaluation of the bilateral upper extremities finds the patient had palpable radial artery at the wrists with brisk capillary refill to all digits.  Patient has intact sensation to axillary radial median and ulnar nerves.  There are no open wounds.  There are no signs of infection.  There is no evidence of lymphedema or lymphatic streaking.  The patient has supple compartments to bilateral arm forearm and hand.  Positive Tinel's over course of median nerve to bilateral wrist.      Radiology:      Assessment: Complex bilateral lower extremity pain numbness and tingling concerning for recurrent bilateral carpal tunnel syndrome versus cervical radiculopathy versus combination      Plan: Treatment options were discussed.  We talked about operative and nonoperative strategies.  Recommendations were made for diagnostic/steroid injections to bilateral carpal tunnels and to follow-up with me in the office after EMG nerve conduction study test.  EMG  will evaluate for cervical radiculopathy versus carpal tunnel syndrome versus combination.  No x-rays upon return to the office.        Procedure:

## 2024-04-16 NOTE — PROGRESS NOTES
Subjective   Patient ID: Rachael Ruiz is a 58 y.o. female who presents for Back Pain and Groin Pain (Right groin pain. Pt only feels pain when sitting up too long. ).    Virtual or Telephone Consent    An interactive audio and video telecommunication system which permits real time communications between the patient (at the originating site) and provider (at the distant site) was utilized to provide this telehealth service.   Verbal consent was requested and obtained from Rachael Ruiz on this date, 04/16/24 for a telehealth visit.     Back Pain  The pain is at a severity of 10/10. Associated symptoms include numbness.      She is here today for follow-up on visit to ED for back pain.  We could not get video to work and today's visit was done over the telephone only  I saw her for an appointment approximately 1 month ago.  At that time she had reported a fall which had occurred this past October, with gradually worsening back pain since then.  I ordered an x-ray of the lumbar spine at that time  Lumbar x-ray done on 4/5/2024 showed concern for underlying discitis osteomyelitis at L2/L3.  She was sent to the ED for further evaluation  She did go to the ED on 4/7.   In the ED she had a CT scan of her lumbar spine which did not show any acute osseous abnormality.  There were postsurgical changes of the lumbar spine, severe spondylosis of the lumbar spine, and spinal stenosis at L2-3 and L3-4.  She had labs including CRP and CBC which were unremarkable.  She was found to have low magnesium at 1.44 and low potassium at 3.1, and this was treated  She was discharged home with a muscle relaxer  She states that she has continued to have back pain since then.  This is a 10 out of 10 pain which is greatest in her middle lower back.  This will sometimes radiate to her legs.  She has chronic neuropathy in both legs up to the level of the knees and is going to be establishing with neurology for this  She follows with  orthopedics, Dr. Coyle and previously did have a laminectomy in 2019.  She is going to be calling them to schedule a follow-up  She also mentions that she has been getting nasal congestion and drainage when she is exposed to dust and would like me to send in a medication to help with this      Review of Systems   Musculoskeletal:  Positive for back pain.   Neurological:  Positive for numbness.       Objective   There were no vitals taken for this visit.    Physical Exam not done due to today's visit being done over the telephone    Assessment/Plan   Problem List Items Addressed This Visit    None  Visit Diagnoses       Lumbar spondylosis    -  Primary    Hypokalemia        Relevant Orders    Basic Metabolic Panel    Magnesium    Hypomagnesemia        Relevant Orders    Basic Metabolic Panel    Magnesium    Non-seasonal allergic rhinitis due to other allergic trigger        Relevant Medications    fluticasone (Flonase) 50 mcg/actuation nasal spray        With recent visit to ED on 4/7 for back pain and lumbar x-ray findings concerning for possible underlying osteomyelitis.  Reviewed records from ED, and she did have labs as well as a CT scan of the lumbar spine which did not show any signs of underlying discitis or osteomyelitis.  I recommended that she contact her orthopedic surgeon, Dr. Coyle to further discuss her back pain.  She is going to contact them to schedule a follow-up  She did have hypokalemia and hypomagnesemia in the ED which was treated.  We will recheck these  Follow-up if any persistent back pain after seeing specialist, otherwise we will recheck her symptoms at her next scheduled appointment  Total time spent today was 12 to 13 minutes

## 2024-04-16 NOTE — TELEPHONE ENCOUNTER
Received rx request from [facility] requesting refills for   spironolactone (Aldactone) 25 mg tablet . Per patient chart this medication was last filled 10/3/23 by Dr. Neumann with a 90 day supply and 3 refills.     Per patients chart, patient was suppose to follow up at [place] office with Dr. Clark 11/01/23. Patient currently has no pending follow ups and will need scheduled for additional refills.    Routed to [facility] for scheduling

## 2024-04-17 ENCOUNTER — APPOINTMENT (OUTPATIENT)
Dept: RADIOLOGY | Facility: CLINIC | Age: 59
End: 2024-04-17
Payer: MEDICARE

## 2024-04-17 ENCOUNTER — TELEPHONE (OUTPATIENT)
Dept: PRIMARY CARE | Facility: CLINIC | Age: 59
End: 2024-04-17
Payer: MEDICARE

## 2024-04-17 ENCOUNTER — DOCUMENTATION (OUTPATIENT)
Dept: HOME HEALTH SERVICES | Facility: HOME HEALTH | Age: 59
End: 2024-04-17
Payer: MEDICARE

## 2024-04-17 ENCOUNTER — HOME HEALTH ADMISSION (OUTPATIENT)
Dept: HOME HEALTH SERVICES | Facility: HOME HEALTH | Age: 59
End: 2024-04-17
Payer: MEDICARE

## 2024-04-17 DIAGNOSIS — M47.816 LUMBAR SPONDYLOSIS: Primary | ICD-10-CM

## 2024-04-17 DIAGNOSIS — J41.0 SIMPLE CHRONIC BRONCHITIS (MULTI): ICD-10-CM

## 2024-04-17 RX ORDER — FLUTICASONE FUROATE AND VILANTEROL TRIFENATATE 200; 25 UG/1; UG/1
POWDER RESPIRATORY (INHALATION)
Qty: 60 EACH | Refills: 3 | Status: SHIPPED | OUTPATIENT
Start: 2024-04-17 | End: 2024-05-06 | Stop reason: SDUPTHER

## 2024-04-17 NOTE — TELEPHONE ENCOUNTER
Patient requesting referral for at home PT. Please advise.     She called back and also mentioned groin pain that radiates to right thigh. Please advise.

## 2024-04-17 NOTE — TELEPHONE ENCOUNTER
Patient states you want to see her next week for groin pain    She needs an afternoon appointment    Please Advise    [phone]

## 2024-04-17 NOTE — HH CARE COORDINATION
Home Care received a Referral for Physical Therapy. We have processed the referral for a Start of Care on 4.18-19.24.     If you have any questions or concerns, please feel free to contact us at [phone]. Follow the prompts, enter your five digit zip code, and you will be directed to your care team on [facility].

## 2024-04-19 ENCOUNTER — HOME CARE VISIT (OUTPATIENT)
Dept: HOME HEALTH SERVICES | Facility: HOME HEALTH | Age: 59
End: 2024-04-19

## 2024-04-20 ASSESSMENT — ENCOUNTER SYMPTOMS
ABDOMINAL PAIN: 1
WEIGHT LOSS: 0
HEMATOCHEZIA: 0
DIARRHEA: 0
DYSURIA: 0
FLATUS: 0
FREQUENCY: 0
CONSTIPATION: 1
ANOREXIA: 0
BELCHING: 0
NAUSEA: 0
MYALGIAS: 1
HEADACHES: 0
HEMATURIA: 0
VOMITING: 0
ARTHRALGIAS: 0
FEVER: 0

## 2024-04-22 ENCOUNTER — APPOINTMENT (OUTPATIENT)
Dept: RADIOLOGY | Facility: HOSPITAL | Age: 59
End: 2024-04-22
Payer: MEDICARE

## 2024-04-23 ENCOUNTER — HOME CARE VISIT (OUTPATIENT)
Dept: HOME HEALTH SERVICES | Facility: HOME HEALTH | Age: 59
End: 2024-04-23

## 2024-04-24 ENCOUNTER — OFFICE VISIT (OUTPATIENT)
Dept: PRIMARY CARE | Facility: CLINIC | Age: 59
End: 2024-04-24
Payer: MEDICARE

## 2024-04-24 VITALS
BODY MASS INDEX: 30.3 KG/M2 | WEIGHT: 150 LBS | OXYGEN SATURATION: 98 % | SYSTOLIC BLOOD PRESSURE: 130 MMHG | HEART RATE: 84 BPM | DIASTOLIC BLOOD PRESSURE: 68 MMHG

## 2024-04-24 DIAGNOSIS — B37.2 CANDIDAL INTERTRIGO: Primary | ICD-10-CM

## 2024-04-24 DIAGNOSIS — R10.31 RIGHT INGUINAL PAIN: ICD-10-CM

## 2024-04-24 PROCEDURE — 3075F SYST BP GE 130 - 139MM HG: CPT | Performed by: FAMILY MEDICINE

## 2024-04-24 PROCEDURE — 3078F DIAST BP <80 MM HG: CPT | Performed by: FAMILY MEDICINE

## 2024-04-24 PROCEDURE — 4004F PT TOBACCO SCREEN RCVD TLK: CPT | Performed by: FAMILY MEDICINE

## 2024-04-24 PROCEDURE — 99213 OFFICE O/P EST LOW 20 MIN: CPT | Performed by: FAMILY MEDICINE

## 2024-04-24 PROCEDURE — 4010F ACE/ARB THERAPY RXD/TAKEN: CPT | Performed by: FAMILY MEDICINE

## 2024-04-24 PROCEDURE — 3008F BODY MASS INDEX DOCD: CPT | Performed by: FAMILY MEDICINE

## 2024-04-24 RX ORDER — NYSTATIN 100000 U/G
CREAM TOPICAL 2 TIMES DAILY
Qty: 30 G | Refills: 0 | Status: SHIPPED | OUTPATIENT
Start: 2024-04-24 | End: 2024-05-01

## 2024-04-24 ASSESSMENT — ENCOUNTER SYMPTOMS
FLANK PAIN: 1
FEVER: 0
ABDOMINAL PAIN: 0
DYSURIA: 0
FREQUENCY: 0
VOMITING: 0
CHILLS: 0
HEMATURIA: 0
HEADACHES: 0
ANOREXIA: 0
BACK PAIN: 1
NAUSEA: 0
FEVER: 0
COUGH: 0
CONSTIPATION: 1
DIARRHEA: 0
BACK PAIN: 1
ABDOMINAL PAIN: 0
SORE THROAT: 0

## 2024-04-24 NOTE — PROGRESS NOTES
Subjective   Patient ID: Rachael Ruiz is a 58 y.o. female who presents for Groin Pain and OTHER (Pt states she has abdominal sore. Requesting Rx to help this. ).    HPI     She had initially scheduled this appointment to discuss pain in her right groin area, but she states that the pain has since resolved.  She tells me that approximately 1 week ago she noticed the pain in her right groin.  This has since improved and she has not had any pain since yesterday  She does have a history of chronic back pain with history of laminectomy in 2019.  She is scheduled to see her orthopedic surgeon in approximately 2 weeks  She also mentions that she has had a rash in her right lower abdominal area present for approximately 3 days.  Using over-the-counter powders without much improvement      Review of Systems   Constitutional:  Negative for fever.   Respiratory:  Negative for cough.    Gastrointestinal:  Negative for abdominal pain.   Musculoskeletal:  Positive for back pain.   Skin:  Positive for rash.       Objective   /68   Pulse 84   Wt 68 kg (150 lb)   SpO2 98%   BMI 30.30 kg/m²     Physical Exam  Vitals reviewed.   Constitutional:       General: She is not in acute distress.     Appearance: Normal appearance. She is well-developed.   HENT:      Head: Normocephalic.   Eyes:      Conjunctiva/sclera: Conjunctivae normal.   Cardiovascular:      Rate and Rhythm: Normal rate and regular rhythm.      Heart sounds: Normal heart sounds.   Pulmonary:      Effort: Pulmonary effort is normal.      Breath sounds: Normal breath sounds.   Abdominal:      Palpations: Abdomen is soft.      Tenderness: There is no abdominal tenderness.   Musculoskeletal:      Comments: In wheelchair   Skin:     Findings: Rash present.      Comments: There are dull erythematous plaques involving right lower abdomen and abdominal fold and symmetrical distribution with satellite lesions   Neurological:      Mental Status: She is alert.    Psychiatric:         Mood and Affect: Mood normal.         Behavior: Behavior normal.         Assessment/Plan   Problem List Items Addressed This Visit    None  Visit Diagnoses       Candidal intertrigo    -  Primary    Relevant Medications    nystatin (Mycostatin) cream    Right inguinal pain            Her right groin pain has since resolved completely.  Recommend scheduling a follow-up if this returns again in future  Rash in right lower abdomen appears to be a mild candidal intertrigo.  Will treat with topical nystatin cream and follow-up in 1 to 2 weeks if this does not resolve

## 2024-04-29 ENCOUNTER — APPOINTMENT (OUTPATIENT)
Dept: OBSTETRICS AND GYNECOLOGY | Facility: CLINIC | Age: 59
End: 2024-04-29
Payer: MEDICARE

## 2024-04-29 ENCOUNTER — APPOINTMENT (OUTPATIENT)
Dept: RADIOLOGY | Facility: HOSPITAL | Age: 59
End: 2024-04-29
Payer: MEDICARE

## 2024-05-03 ENCOUNTER — APPOINTMENT (OUTPATIENT)
Dept: ORTHOPEDIC SURGERY | Facility: CLINIC | Age: 59
End: 2024-05-03
Payer: MEDICARE

## 2024-05-06 DIAGNOSIS — E11.9 TYPE 2 DIABETES MELLITUS WITHOUT COMPLICATION, UNSPECIFIED WHETHER LONG TERM INSULIN USE (MULTI): ICD-10-CM

## 2024-05-06 DIAGNOSIS — E11.9 TYPE 2 DIABETES MELLITUS WITHOUT COMPLICATION, WITHOUT LONG-TERM CURRENT USE OF INSULIN (MULTI): ICD-10-CM

## 2024-05-06 DIAGNOSIS — I10 ESSENTIAL HYPERTENSION, BENIGN: ICD-10-CM

## 2024-05-06 DIAGNOSIS — J30.89 NON-SEASONAL ALLERGIC RHINITIS DUE TO OTHER ALLERGIC TRIGGER: ICD-10-CM

## 2024-05-06 DIAGNOSIS — E11.42 TYPE 2 DIABETES MELLITUS WITH DIABETIC POLYNEUROPATHY, WITHOUT LONG-TERM CURRENT USE OF INSULIN (MULTI): ICD-10-CM

## 2024-05-06 DIAGNOSIS — E78.5 HYPERLIPIDEMIA, UNSPECIFIED HYPERLIPIDEMIA TYPE: ICD-10-CM

## 2024-05-06 DIAGNOSIS — J41.0 SIMPLE CHRONIC BRONCHITIS (MULTI): ICD-10-CM

## 2024-05-06 RX ORDER — DEXTROSE 4 G
1 TABLET,CHEWABLE ORAL 3 TIMES DAILY
COMMUNITY
End: 2024-05-06 | Stop reason: SDUPTHER

## 2024-05-07 RX ORDER — FLUTICASONE FUROATE AND VILANTEROL 200; 25 UG/1; UG/1
POWDER RESPIRATORY (INHALATION)
Qty: 60 EACH | Refills: 11 | Status: SHIPPED | OUTPATIENT
Start: 2024-05-07

## 2024-05-07 RX ORDER — LANCETS
1 EACH MISCELLANEOUS 3 TIMES DAILY
Qty: 100 EACH | Refills: 2 | Status: SHIPPED | OUTPATIENT
Start: 2024-05-07

## 2024-05-07 RX ORDER — VENLAFAXINE HYDROCHLORIDE 37.5 MG/1
37.5 CAPSULE, EXTENDED RELEASE ORAL DAILY
Qty: 90 CAPSULE | Refills: 2 | OUTPATIENT
Start: 2024-05-07

## 2024-05-07 RX ORDER — METFORMIN HYDROCHLORIDE 1000 MG/1
1000 TABLET ORAL
Qty: 180 TABLET | Refills: 3 | Status: SHIPPED | OUTPATIENT
Start: 2024-05-07

## 2024-05-07 RX ORDER — HYDROCHLOROTHIAZIDE 12.5 MG/1
12.5 TABLET ORAL DAILY
Qty: 90 TABLET | Refills: 3 | Status: SHIPPED | OUTPATIENT
Start: 2024-05-07

## 2024-05-07 RX ORDER — BLOOD SUGAR DIAGNOSTIC
STRIP MISCELLANEOUS
Qty: 100 STRIP | Refills: 11 | Status: SHIPPED | OUTPATIENT
Start: 2024-05-07

## 2024-05-07 RX ORDER — DEXTROSE 4 G
1 TABLET,CHEWABLE ORAL 3 TIMES DAILY
Qty: 1 EACH | Refills: 0 | Status: SHIPPED | OUTPATIENT
Start: 2024-05-07

## 2024-05-07 RX ORDER — ATORVASTATIN CALCIUM 80 MG/1
80 TABLET, FILM COATED ORAL DAILY
Qty: 90 TABLET | Refills: 3 | Status: SHIPPED | OUTPATIENT
Start: 2024-05-07

## 2024-05-07 RX ORDER — LOSARTAN POTASSIUM 100 MG/1
100 TABLET ORAL DAILY
Qty: 90 TABLET | Refills: 3 | Status: SHIPPED | OUTPATIENT
Start: 2024-05-07

## 2024-05-07 RX ORDER — VENLAFAXINE HYDROCHLORIDE 75 MG/1
75 CAPSULE, EXTENDED RELEASE ORAL DAILY
Qty: 90 CAPSULE | Refills: 2 | OUTPATIENT
Start: 2024-05-07

## 2024-05-09 DIAGNOSIS — J44.9 CHRONIC OBSTRUCTIVE PULMONARY DISEASE, UNSPECIFIED COPD TYPE (MULTI): ICD-10-CM

## 2024-05-09 RX ORDER — PEN NEEDLE, DIABETIC 31 GX5/16"
1 NEEDLE, DISPOSABLE MISCELLANEOUS DAILY PRN
Qty: 10 EACH | Refills: 0 | Status: SHIPPED | OUTPATIENT
Start: 2024-05-09

## 2024-05-09 RX ORDER — NEBULIZER AND COMPRESSOR
EACH MISCELLANEOUS
Qty: 1 EACH | Refills: 0 | Status: SHIPPED | OUTPATIENT
Start: 2024-05-09

## 2024-05-09 NOTE — TELEPHONE ENCOUNTER
Pt requesting RX for itching cream x vaginal area. States she was using wipes and sometimes forgets to dry herself. No discharge or other symptoms. Please advise.

## 2024-05-10 ENCOUNTER — TELEPHONE (OUTPATIENT)
Dept: PRIMARY CARE | Facility: CLINIC | Age: 59
End: 2024-05-10

## 2024-05-10 ENCOUNTER — TELEMEDICINE (OUTPATIENT)
Dept: PRIMARY CARE | Facility: CLINIC | Age: 59
End: 2024-05-10
Payer: MEDICARE

## 2024-05-10 ENCOUNTER — APPOINTMENT (OUTPATIENT)
Dept: ORTHOPEDIC SURGERY | Facility: CLINIC | Age: 59
End: 2024-05-10
Payer: MEDICARE

## 2024-05-10 DIAGNOSIS — R35.0 URINARY FREQUENCY: Primary | ICD-10-CM

## 2024-05-10 PROCEDURE — 4004F PT TOBACCO SCREEN RCVD TLK: CPT

## 2024-05-10 PROCEDURE — 4010F ACE/ARB THERAPY RXD/TAKEN: CPT

## 2024-05-10 PROCEDURE — 99441 PR PHYS/QHP TELEPHONE EVALUATION 5-10 MIN: CPT

## 2024-05-10 PROCEDURE — 3008F BODY MASS INDEX DOCD: CPT

## 2024-05-10 ASSESSMENT — ENCOUNTER SYMPTOMS: FREQUENCY: 1

## 2024-05-10 NOTE — PROGRESS NOTES
Subjective   Patient ID: Rachael Ruiz is a 58 y.o. female who presents for UTI.    Urinary Frequency   This is a new problem. The current episode started yesterday. The problem has been gradually improving. Associated symptoms include frequency.        Virtual or Telephone Consent    A telephone visit (audio only) between the patient (at the originating site) and the provider (at the distant site) was utilized to provide this telehealth service.   Verbal consent was requested and obtained from Rachael Ruiz on this date, 05/10/24 for a telehealth visit.     Today's visit was done over the telephone  She is concerned about a possible UTI.  She has had urinary frequency present since yesterday.  No dysuria or urinary urgency.  Denies any fever, abdominal pain or flank pain.  No vaginal discharge.  She has had some vaginal itching but has not noticed a rash.  She only notices the itching when she urinates.  She is a diabetic.  Average glucose has been 1 20-1 40.  Symptoms are better today but still present        Review of Systems   Genitourinary:  Positive for frequency.       Objective   There were no vitals taken for this visit.    Physical Exam not done due to today's visit being done virtually    Assessment/Plan   Problem List Items Addressed This Visit    None  Visit Diagnoses       Urinary frequency    -  Primary    Relevant Orders    Urine Culture    Urinalysis with Reflex Microscopic        She presents today with a 2-day history of urinary frequency without any abdominal pain, fever, dysuria or urinary urgency.  We we will check a urinalysis and urine culture to make sure that no UTI is present.  Plan on following up by phone on Monday next week once I have culture results.  Advise ER if any abdominal pain or fever or worsening symptoms.  If culture is negative recommended that she contact me next week if symptoms have not resolved  Total time spent today was approximately 6 minutes

## 2024-05-10 NOTE — TELEPHONE ENCOUNTER
Pt called requesting an order for a UA/C&S, states she is having UTI symptoms and is going to the lab this afternoon to have blood drawn.

## 2024-05-13 ENCOUNTER — APPOINTMENT (OUTPATIENT)
Dept: CARDIOLOGY | Facility: CLINIC | Age: 59
End: 2024-05-13
Payer: MEDICARE

## 2024-05-13 ASSESSMENT — ENCOUNTER SYMPTOMS
FLANK PAIN: 1
BACK PAIN: 1
HEADACHES: 0
SORE THROAT: 0
ABDOMINAL PAIN: 0
FEVER: 0
CHILLS: 0
HEMATURIA: 0
NAUSEA: 0
ANOREXIA: 0
DIARRHEA: 0
CONSTIPATION: 1
DYSURIA: 0
FREQUENCY: 0
VOMITING: 0

## 2024-05-14 ENCOUNTER — HOSPITAL ENCOUNTER (OUTPATIENT)
Dept: NEUROLOGY | Facility: HOSPITAL | Age: 59
End: 2024-05-14
Payer: MEDICARE

## 2024-05-17 ENCOUNTER — APPOINTMENT (OUTPATIENT)
Dept: OBSTETRICS AND GYNECOLOGY | Facility: CLINIC | Age: 59
End: 2024-05-17
Payer: MEDICARE

## 2024-05-17 ENCOUNTER — APPOINTMENT (OUTPATIENT)
Dept: RADIOLOGY | Facility: HOSPITAL | Age: 59
End: 2024-05-17
Payer: MEDICARE

## 2024-05-20 DIAGNOSIS — J44.9 CHRONIC OBSTRUCTIVE PULMONARY DISEASE, UNSPECIFIED COPD TYPE (MULTI): ICD-10-CM

## 2024-05-20 DIAGNOSIS — J30.89 NON-SEASONAL ALLERGIC RHINITIS DUE TO OTHER ALLERGIC TRIGGER: ICD-10-CM

## 2024-05-20 RX ORDER — FLUTICASONE PROPIONATE 50 MCG
2 SPRAY, SUSPENSION (ML) NASAL DAILY
Qty: 16 G | Refills: 11 | Status: SHIPPED | OUTPATIENT
Start: 2024-05-20

## 2024-05-20 RX ORDER — ALBUTEROL SULFATE 90 UG/1
AEROSOL, METERED RESPIRATORY (INHALATION)
Qty: 18 G | Refills: 5 | Status: SHIPPED | OUTPATIENT
Start: 2024-05-20

## 2024-05-21 ENCOUNTER — APPOINTMENT (OUTPATIENT)
Dept: ORTHOPEDIC SURGERY | Facility: CLINIC | Age: 59
End: 2024-05-21
Payer: MEDICARE

## 2024-05-23 ENCOUNTER — TELEMEDICINE (OUTPATIENT)
Dept: PRIMARY CARE | Facility: CLINIC | Age: 59
End: 2024-05-23
Payer: MEDICARE

## 2024-05-23 DIAGNOSIS — N39.46 MIXED STRESS AND URGE URINARY INCONTINENCE: Primary | ICD-10-CM

## 2024-05-23 DIAGNOSIS — J44.9 CHRONIC OBSTRUCTIVE PULMONARY DISEASE, UNSPECIFIED COPD TYPE (MULTI): Primary | ICD-10-CM

## 2024-05-23 PROBLEM — N39.0 URINARY TRACT INFECTION: Status: RESOLVED | Noted: 2023-09-30 | Resolved: 2024-05-23

## 2024-05-23 PROCEDURE — 4010F ACE/ARB THERAPY RXD/TAKEN: CPT | Performed by: FAMILY MEDICINE

## 2024-05-23 PROCEDURE — 3008F BODY MASS INDEX DOCD: CPT | Performed by: FAMILY MEDICINE

## 2024-05-23 PROCEDURE — 99441 PR PHYS/QHP TELEPHONE EVALUATION 5-10 MIN: CPT | Performed by: FAMILY MEDICINE

## 2024-05-23 PROCEDURE — 4004F PT TOBACCO SCREEN RCVD TLK: CPT | Performed by: FAMILY MEDICINE

## 2024-05-23 RX ORDER — LANCETS 33 GAUGE
EACH MISCELLANEOUS
COMMUNITY
Start: 2024-05-12

## 2024-05-23 RX ORDER — ALBUTEROL SULFATE 0.83 MG/ML
2.5 SOLUTION RESPIRATORY (INHALATION) EVERY 4 HOURS PRN
Qty: 3 ML | Refills: 2 | Status: SHIPPED | OUTPATIENT
Start: 2024-05-23

## 2024-05-23 NOTE — PROGRESS NOTES
Subjective   Patient ID: Rachael Ruiz is a 58 y.o. female who presents for Incontinence supplies.    Virtual or Telephone Consent    A telephone visit (audio only) between the patient (at the originating site) and the provider (at the distant site) was utilized to provide this telehealth service.   Verbal consent was requested and obtained from Rachael Ruiz on this date, 05/23/24 for a telehealth visit.     Today's visit was done over the telephone.  The purpose of today's visit is to validate the need for using incontinence supplies  She has a history of urinary incontinence and has been using incontinence supplies (underwear and pads) daily to help with this.  She will get stress incontinence symptoms and lose small amounts of urine with activities such as coughing and straining.  She will also occasionally get urgency symptoms where she gets an urge to urinate.  She has been using the supplies daily and feels that they do help  She is in a wheelchair which also limits her ability to get to the restroom    Review of Systems    Objective   There were no vitals taken for this visit.    Physical Exam not done due to today's visit being done virtually    Assessment/Plan   Problem List Items Addressed This Visit    None  Visit Diagnoses       Mixed stress and urge urinary incontinence    -  Primary        She has a history of stress and urge urinary incontinence, as well as limited mobility due to being in a wheelchair, and would benefit from continuing to use incontinence supplies.  We will fax a copy of this office note to her incontinence supply company.    Total time spent today was between 5 and 10 minutes

## 2024-05-28 ENCOUNTER — APPOINTMENT (OUTPATIENT)
Dept: ORTHOPEDIC SURGERY | Facility: CLINIC | Age: 59
End: 2024-05-28
Payer: MEDICARE

## 2024-06-11 ENCOUNTER — APPOINTMENT (OUTPATIENT)
Dept: NEUROLOGY | Facility: HOSPITAL | Age: 59
End: 2024-06-11
Payer: MEDICARE

## 2024-06-11 DIAGNOSIS — J44.9 CHRONIC OBSTRUCTIVE PULMONARY DISEASE, UNSPECIFIED COPD TYPE (MULTI): Primary | ICD-10-CM

## 2024-06-11 NOTE — TELEPHONE ENCOUNTER
Pt states she experiencing body heat. Wants to know if she could possibly have a bone infection after fracture. Please advise.

## 2024-06-12 DIAGNOSIS — E11.42 TYPE 2 DIABETES MELLITUS WITH DIABETIC POLYNEUROPATHY, WITHOUT LONG-TERM CURRENT USE OF INSULIN (MULTI): ICD-10-CM

## 2024-06-12 RX ORDER — GABAPENTIN 800 MG/1
800 TABLET ORAL 4 TIMES DAILY
Qty: 120 TABLET | Refills: 2 | Status: SHIPPED | OUTPATIENT
Start: 2024-06-12

## 2024-06-13 ENCOUNTER — APPOINTMENT (OUTPATIENT)
Dept: CARDIOLOGY | Facility: CLINIC | Age: 59
End: 2024-06-13
Payer: MEDICARE

## 2024-06-13 NOTE — TELEPHONE ENCOUNTER
Pt requesting Xray for upper back.   Also requesting you look over blood work.     Missed cardiology appt has rescheduled for 7/2024.

## 2024-06-14 ENCOUNTER — TELEPHONE (OUTPATIENT)
Dept: PRIMARY CARE | Facility: CLINIC | Age: 59
End: 2024-06-14
Payer: MEDICARE

## 2024-06-14 DIAGNOSIS — J44.9 CHRONIC OBSTRUCTIVE PULMONARY DISEASE, UNSPECIFIED COPD TYPE (MULTI): Primary | ICD-10-CM

## 2024-06-14 RX ORDER — BENZONATATE 100 MG/1
100 CAPSULE ORAL 3 TIMES DAILY PRN
Qty: 21 CAPSULE | Refills: 0 | Status: SHIPPED | OUTPATIENT
Start: 2024-06-14 | End: 2024-06-21

## 2024-06-14 NOTE — TELEPHONE ENCOUNTER
Pt called requesting a prescrption for cough medicine, states that the OTC ones do not work. Please advise.

## 2024-06-18 ENCOUNTER — APPOINTMENT (OUTPATIENT)
Dept: ORTHOPEDIC SURGERY | Facility: CLINIC | Age: 59
End: 2024-06-18
Payer: MEDICARE

## 2024-07-03 ENCOUNTER — TELEPHONE (OUTPATIENT)
Dept: PRIMARY CARE | Facility: CLINIC | Age: 59
End: 2024-07-03
Payer: MEDICARE

## 2024-07-16 ENCOUNTER — APPOINTMENT (OUTPATIENT)
Dept: ORTHOPEDIC SURGERY | Facility: CLINIC | Age: 59
End: 2024-07-16
Payer: MEDICARE

## 2024-07-17 ENCOUNTER — APPOINTMENT (OUTPATIENT)
Dept: CARDIOLOGY | Facility: CLINIC | Age: 59
End: 2024-07-17
Payer: MEDICARE

## 2024-07-21 ASSESSMENT — ENCOUNTER SYMPTOMS
HUNGER: 0
CONFUSION: 0
TREMORS: 0
POLYDIPSIA: 1
HEADACHES: 0
NERVOUS/ANXIOUS: 1
BLACKOUTS: 0
SPEECH DIFFICULTY: 0
SEIZURES: 0
DIZZINESS: 0
SWEATS: 1
BLURRED VISION: 0

## 2024-07-22 ENCOUNTER — APPOINTMENT (OUTPATIENT)
Dept: PRIMARY CARE | Facility: CLINIC | Age: 59
End: 2024-07-22
Payer: MEDICARE

## 2024-07-22 VITALS
SYSTOLIC BLOOD PRESSURE: 94 MMHG | HEART RATE: 62 BPM | WEIGHT: 150 LBS | DIASTOLIC BLOOD PRESSURE: 60 MMHG | BODY MASS INDEX: 30.3 KG/M2 | OXYGEN SATURATION: 95 %

## 2024-07-22 DIAGNOSIS — I10 HYPERTENSION, UNSPECIFIED TYPE: ICD-10-CM

## 2024-07-22 DIAGNOSIS — E11.42 TYPE 2 DIABETES MELLITUS WITH DIABETIC POLYNEUROPATHY, WITHOUT LONG-TERM CURRENT USE OF INSULIN (MULTI): Primary | ICD-10-CM

## 2024-07-22 DIAGNOSIS — E78.5 HYPERLIPIDEMIA, UNSPECIFIED HYPERLIPIDEMIA TYPE: ICD-10-CM

## 2024-07-22 LAB — POC HEMOGLOBIN A1C: 6 % (ref 4.2–6.5)

## 2024-07-22 PROCEDURE — 4004F PT TOBACCO SCREEN RCVD TLK: CPT | Performed by: FAMILY MEDICINE

## 2024-07-22 PROCEDURE — 3078F DIAST BP <80 MM HG: CPT | Performed by: FAMILY MEDICINE

## 2024-07-22 PROCEDURE — 99214 OFFICE O/P EST MOD 30 MIN: CPT | Performed by: FAMILY MEDICINE

## 2024-07-22 PROCEDURE — G2211 COMPLEX E/M VISIT ADD ON: HCPCS | Performed by: FAMILY MEDICINE

## 2024-07-22 PROCEDURE — 3074F SYST BP LT 130 MM HG: CPT | Performed by: FAMILY MEDICINE

## 2024-07-22 PROCEDURE — 4010F ACE/ARB THERAPY RXD/TAKEN: CPT | Performed by: FAMILY MEDICINE

## 2024-07-22 PROCEDURE — 83036 HEMOGLOBIN GLYCOSYLATED A1C: CPT | Performed by: FAMILY MEDICINE

## 2024-07-22 ASSESSMENT — ENCOUNTER SYMPTOMS
SEIZURES: 0
BLURRED VISION: 0
HUNGER: 0
CONFUSION: 0
HEADACHES: 0
COUGH: 1
DIZZINESS: 0
SWEATS: 1
NUMBNESS: 1
SPEECH DIFFICULTY: 0
TREMORS: 0
BLACKOUTS: 0
NERVOUS/ANXIOUS: 1
POLYDIPSIA: 1
FEVER: 0

## 2024-07-22 NOTE — ASSESSMENT & PLAN NOTE
Orders:    POCT glycosylated hemoglobin (Hb A1C) manually resulted    CBC and Auto Differential; Future    Comprehensive Metabolic Panel; Future    Lipid Panel; Future    Vitamin B12; Future    Albumin-Creatinine Ratio, Urine Random; Future

## 2024-07-22 NOTE — PROGRESS NOTES
Subjective   Patient ID: Rachael Ruiz is a 58 y.o. female who presents for Diabetes.    Diabetes  No MedicAlert identification noted. The initial diagnosis of diabetes was made 43 years ago. Hypoglycemia symptoms include mood changes, nervousness/anxiousness and sweats. Pertinent negatives for hypoglycemia include no confusion, dizziness, headaches, hunger, pallor, seizures, sleepiness, speech difficulty or tremors. Associated symptoms include foot paresthesias and polydipsia. Pertinent negatives for diabetes include no blurred vision and no chest pain. Pertinent negatives for hypoglycemia complications include no blackouts, no hospitalization, no nocturnal hypoglycemia, no required assistance and no required glucagon injection. Symptoms are improving. Diabetic complications include peripheral neuropathy and PVD. Pertinent negatives for diabetic complications include no CVA, heart disease, impotence, nephropathy or retinopathy. Risk factors for coronary artery disease include dyslipidemia, family history, hypertension, post-menopausal, sedentary lifestyle, stress and tobacco exposure. Current diabetic treatment includes oral agent (dual therapy). She is compliant with treatment all of the time. Her weight is stable. She is following a generally unhealthy diet. When asked about meal planning, she reported none. She has not had a previous visit with a dietitian. She never participates in exercise. She monitors blood glucose at home 3-4 x per week. She monitors urine at home <1 x per month. Blood glucose monitoring compliance is adequate. There is no change in her home blood glucose trend. Her breakfast blood glucose is taken after 10 am. Her breakfast blood glucose range is generally 130-140 mg/dl. Her lunch blood glucose is taken after 3 pm. Her lunch blood glucose range is generally 110-130 mg/dl. Her dinner blood glucose is taken after 8 pm. Her dinner blood glucose range is generally  mg/dl. Her bedtime  blood glucose is taken after 11 pm. Her bedtime blood glucose range is generally  mg/dl. Her overall blood glucose range is  mg/dl. She sees a podiatrist.Eye exam is not current.        Review of Systems   Eyes:  Negative for blurred vision.   Cardiovascular:  Negative for chest pain.   Endocrine: Positive for polydipsia.   Genitourinary:  Negative for impotence.   Skin:  Negative for pallor.   Neurological:  Negative for dizziness, tremors, seizures, speech difficulty and headaches.   Psychiatric/Behavioral:  Negative for confusion. The patient is nervous/anxious.        Objective   There were no vitals taken for this visit.    Physical Exam    Assessment/Plan   Assessment & Plan  Type 2 diabetes mellitus with diabetic polyneuropathy, without long-term current use of insulin (Multi)         Hypertension, unspecified type         Hyperlipidemia, unspecified hyperlipidemia type

## 2024-07-22 NOTE — PROGRESS NOTES
Subjective   Patient ID: Rachael Ruiz is a 58 y.o. female who presents for Diabetes.    Diabetes  No MedicAlert identification noted. The initial diagnosis of diabetes was made 43 years ago. Hypoglycemia symptoms include mood changes, nervousness/anxiousness and sweats. Pertinent negatives for hypoglycemia include no confusion, dizziness, headaches, hunger, pallor, seizures, sleepiness, speech difficulty or tremors. Associated symptoms include foot paresthesias and polydipsia. Pertinent negatives for diabetes include no blurred vision and no chest pain. Pertinent negatives for hypoglycemia complications include no blackouts, no hospitalization, no nocturnal hypoglycemia, no required assistance and no required glucagon injection. Symptoms are improving. Diabetic complications include peripheral neuropathy and PVD. Pertinent negatives for diabetic complications include no CVA, heart disease, impotence, nephropathy or retinopathy. Risk factors for coronary artery disease include dyslipidemia, family history, hypertension, post-menopausal, sedentary lifestyle, stress and tobacco exposure. Current diabetic treatment includes oral agent (dual therapy). She is compliant with treatment all of the time. Her weight is stable. She is following a generally unhealthy diet. When asked about meal planning, she reported none. She has not had a previous visit with a dietitian. She never participates in exercise. She monitors blood glucose at home 3-4 x per week. She monitors urine at home <1 x per month. Blood glucose monitoring compliance is adequate. There is no change in her home blood glucose trend. Her breakfast blood glucose is taken after 10 am. Her breakfast blood glucose range is generally 130-140 mg/dl. Her lunch blood glucose is taken after 3 pm. Her lunch blood glucose range is generally 110-130 mg/dl. Her dinner blood glucose is taken after 8 pm. Her dinner blood glucose range is generally  mg/dl. Her bedtime  blood glucose is taken after 11 pm. Her bedtime blood glucose range is generally  mg/dl. Her overall blood glucose range is  mg/dl. She sees a podiatrist.Eye exam is not current.          She is here today for follow-up  Diabetes mellitus: Currently takes Trulicity 3 mg weekly and metformin 1000 mg twice daily.  Her A1c at last visit in March was controlled at 6.0%  She has a history of diabetic neuropathy currently taking gabapentin 800 mg 4 times per day.  She is scheduled to establish with neurology on 8/15  Hypertension: Currently takes spironolactone 25 mg daily, HCTZ 12.5 mg daily and losartan 100 mg daily  Hyperlipidemia currently takes atorvastatin 80 mg daily last lipid panel checked 10/1/2023 showed LDL of 43  She has a history of vitamin B 12 deficiency currently taking p.o. vitamin B12 1000 mcg.  Last vitamin B12 level checked in October was in normal range      She has been checking her glucose out of the office and it has been less than 134  Checks blood pressure out of the office-she states that her diastolic is typically in the 70s, but she cannot recall what her systolic blood pressure has been.  She denies any dizziness or lightheadedness  She is still having neuropathy involving both legs up to the level of the knee.  Gabapentin helps with her neuropathy      Review of Systems   Constitutional:  Negative for fever.   Eyes:  Negative for blurred vision.   Respiratory:  Positive for cough.    Cardiovascular:  Negative for chest pain.   Endocrine: Positive for polydipsia.   Genitourinary:  Negative for impotence.   Skin:  Negative for pallor.   Neurological:  Positive for numbness. Negative for dizziness, tremors, seizures, speech difficulty and headaches.   Psychiatric/Behavioral:  Negative for confusion. The patient is nervous/anxious.        Objective   BP 91/61   Pulse 62   Wt 68 kg (150 lb)   SpO2 95%   BMI 30.30 kg/m²     Physical Exam  Vitals reviewed.   Constitutional:        General: She is not in acute distress.     Appearance: Normal appearance. She is well-developed.   HENT:      Head: Normocephalic.   Eyes:      Conjunctiva/sclera: Conjunctivae normal.   Cardiovascular:      Rate and Rhythm: Normal rate and regular rhythm.      Heart sounds: Normal heart sounds.   Pulmonary:      Effort: Pulmonary effort is normal.      Breath sounds: Normal breath sounds.   Musculoskeletal:      Right lower leg: No edema.      Left lower leg: No edema.   Skin:     Findings: No rash.   Neurological:      Mental Status: She is alert.      Sensory: Sensory deficit present.      Comments: Decree sensation to light touch both lower extremities up to level of knee   Psychiatric:         Mood and Affect: Mood normal.         Behavior: Behavior normal.         Assessment/Plan   Assessment & Plan  Type 2 diabetes mellitus with diabetic polyneuropathy, without long-term current use of insulin (Multi)    Orders:    POCT glycosylated hemoglobin (Hb A1C) manually resulted    CBC and Auto Differential; Future    Comprehensive Metabolic Panel; Future    Lipid Panel; Future    Vitamin B12; Future    Albumin-Creatinine Ratio, Urine Random; Future    Hypertension, unspecified type    Orders:    POCT glycosylated hemoglobin (Hb A1C) manually resulted    CBC and Auto Differential; Future    Comprehensive Metabolic Panel; Future    Lipid Panel; Future    Vitamin B12; Future    Albumin-Creatinine Ratio, Urine Random; Future    Hyperlipidemia, unspecified hyperlipidemia type    Orders:    POCT glycosylated hemoglobin (Hb A1C) manually resulted    CBC and Auto Differential; Future    Comprehensive Metabolic Panel; Future    Lipid Panel; Future    Vitamin B12; Future    Albumin-Creatinine Ratio, Urine Random; Future    Diabetes mellitus: This is controlled with A1c of 6.0%.  Continue Trulicity and metformin at current dose  Diabetic neuropathy: Stable on gabapentin, continue current dose and she is going to be seeing  neurology next month  An [facility] report was printed and I have personally reviewed the results.  The report will be scanned into the health record.  I have considered the risk of abuse, dependance, addiction, and diversion.  I believe it is clinically appropriate for this patient to continue taking this medication.  Hypertension: Her blood pressure today is at the low end at 94/60.  She denies any dizziness, and at last visit her blood pressure had been 130/68.  We will hold spironolactone and continue losartan and HCTZ.  Follow-up in 1 month to recheck blood pressure  B12 deficiency: Last B12 level was normal range.  Continue p.o. vitamin B12 and we will recheck this with next labs  Hyperlipidemia: Stable on atorvastatin with last LDL 43.  Continue current dose and recheck lipid panel

## 2024-07-23 ENCOUNTER — TELEPHONE (OUTPATIENT)
Dept: PRIMARY CARE | Facility: CLINIC | Age: 59
End: 2024-07-23
Payer: MEDICARE

## 2024-07-23 ENCOUNTER — HOSPITAL ENCOUNTER (OUTPATIENT)
Dept: NEUROLOGY | Facility: HOSPITAL | Age: 59
End: 2024-07-23
Payer: MEDICARE

## 2024-07-23 DIAGNOSIS — M47.816 LUMBAR SPONDYLOSIS: Primary | ICD-10-CM

## 2024-07-23 DIAGNOSIS — G56.03 BILATERAL CARPAL TUNNEL SYNDROME: ICD-10-CM

## 2024-07-26 DIAGNOSIS — G25.81 RESTLESS LEGS SYNDROME: ICD-10-CM

## 2024-07-26 RX ORDER — ROPINIROLE 1 MG/1
2 TABLET, FILM COATED ORAL NIGHTLY
Qty: 60 TABLET | Refills: 3 | Status: SHIPPED | OUTPATIENT
Start: 2024-07-26

## 2024-07-29 ENCOUNTER — APPOINTMENT (OUTPATIENT)
Dept: NEUROLOGY | Facility: HOSPITAL | Age: 59
End: 2024-07-29
Payer: MEDICARE

## 2024-08-02 ENCOUNTER — APPOINTMENT (OUTPATIENT)
Dept: HEMATOLOGY/ONCOLOGY | Facility: CLINIC | Age: 59
End: 2024-08-02
Payer: MEDICARE

## 2024-08-02 DIAGNOSIS — E78.5 HYPERLIPIDEMIA, UNSPECIFIED HYPERLIPIDEMIA TYPE: ICD-10-CM

## 2024-08-02 RX ORDER — ATORVASTATIN CALCIUM 80 MG/1
80 TABLET, FILM COATED ORAL DAILY
Qty: 90 TABLET | Refills: 3 | Status: SHIPPED | OUTPATIENT
Start: 2024-08-02

## 2024-08-05 ENCOUNTER — TELEPHONE (OUTPATIENT)
Dept: PRIMARY CARE | Facility: CLINIC | Age: 59
End: 2024-08-05
Payer: MEDICARE

## 2024-08-05 DIAGNOSIS — R35.0 URINARY FREQUENCY: Primary | ICD-10-CM

## 2024-08-05 DIAGNOSIS — E11.42 TYPE 2 DIABETES MELLITUS WITH DIABETIC POLYNEUROPATHY, WITHOUT LONG-TERM CURRENT USE OF INSULIN (MULTI): ICD-10-CM

## 2024-08-05 RX ORDER — LANCETS 33 GAUGE
EACH MISCELLANEOUS
Qty: 100 EACH | Refills: 11 | Status: SHIPPED | OUTPATIENT
Start: 2024-08-05

## 2024-08-05 NOTE — TELEPHONE ENCOUNTER
Please call her regarding urine test results.  I just received a urinalysis from 8/1 which is showing a possible UTI.  I do not see that a culture was ordered.  Is she currently having any urinary symptoms?

## 2024-08-06 ENCOUNTER — APPOINTMENT (OUTPATIENT)
Dept: ORTHOPEDIC SURGERY | Facility: CLINIC | Age: 59
End: 2024-08-06
Payer: MEDICARE

## 2024-08-12 ENCOUNTER — TELEMEDICINE (OUTPATIENT)
Dept: PRIMARY CARE | Facility: CLINIC | Age: 59
End: 2024-08-12
Payer: MEDICARE

## 2024-08-12 ENCOUNTER — TELEPHONE (OUTPATIENT)
Dept: PRIMARY CARE | Facility: CLINIC | Age: 59
End: 2024-08-12
Payer: MEDICARE

## 2024-08-12 DIAGNOSIS — N39.0 ACUTE UTI: Primary | ICD-10-CM

## 2024-08-12 PROCEDURE — 99441 PR PHYS/QHP TELEPHONE EVALUATION 5-10 MIN: CPT | Performed by: FAMILY MEDICINE

## 2024-08-12 PROCEDURE — 4010F ACE/ARB THERAPY RXD/TAKEN: CPT | Performed by: FAMILY MEDICINE

## 2024-08-12 RX ORDER — NITROFURANTOIN 25; 75 MG/1; MG/1
100 CAPSULE ORAL 2 TIMES DAILY
Qty: 14 CAPSULE | Refills: 0 | Status: SHIPPED | OUTPATIENT
Start: 2024-08-12 | End: 2024-08-19

## 2024-08-12 ASSESSMENT — ENCOUNTER SYMPTOMS
FEVER: 0
FREQUENCY: 1

## 2024-08-12 NOTE — TELEPHONE ENCOUNTER
I received her urine culture results from last week and today showing that she has a urinary tract infection.  I would like to see her for a quick visit so we can discuss any symptoms that she may be having and get her started on an antibiotic.  I could see her at 1 PM today.  It would be okay for this to be a virtual visit if she would prefer this

## 2024-08-15 ENCOUNTER — APPOINTMENT (OUTPATIENT)
Dept: NEUROLOGY | Facility: CLINIC | Age: 59
End: 2024-08-15
Payer: MEDICARE

## 2024-08-19 ENCOUNTER — APPOINTMENT (OUTPATIENT)
Dept: CARDIOLOGY | Facility: CLINIC | Age: 59
End: 2024-08-19
Payer: MEDICARE

## 2024-08-21 DIAGNOSIS — E11.9 TYPE 2 DIABETES MELLITUS WITHOUT COMPLICATION, UNSPECIFIED WHETHER LONG TERM INSULIN USE (MULTI): ICD-10-CM

## 2024-08-21 RX ORDER — BLOOD SUGAR DIAGNOSTIC
STRIP MISCELLANEOUS
Qty: 100 STRIP | Refills: 11 | Status: SHIPPED | OUTPATIENT
Start: 2024-08-21

## 2024-08-21 NOTE — TELEPHONE ENCOUNTER
Patient states she's unsure if UTI is cured because her neuropathy has flared up and her body is numb. Please advise.

## 2024-09-01 DIAGNOSIS — I10 ESSENTIAL HYPERTENSION, BENIGN: ICD-10-CM

## 2024-09-01 DIAGNOSIS — J41.0 SIMPLE CHRONIC BRONCHITIS (MULTI): ICD-10-CM

## 2024-09-03 RX ORDER — FLUTICASONE FUROATE AND VILANTEROL 200; 25 UG/1; UG/1
POWDER RESPIRATORY (INHALATION)
Qty: 60 EACH | Refills: 3 | Status: SHIPPED | OUTPATIENT
Start: 2024-09-03

## 2024-09-03 RX ORDER — LOSARTAN POTASSIUM 100 MG/1
100 TABLET ORAL DAILY
Qty: 90 TABLET | Refills: 3 | Status: SHIPPED | OUTPATIENT
Start: 2024-09-03

## 2024-09-05 ENCOUNTER — APPOINTMENT (OUTPATIENT)
Dept: PRIMARY CARE | Facility: CLINIC | Age: 59
End: 2024-09-05
Payer: MEDICARE

## 2024-09-09 ENCOUNTER — HOSPITAL ENCOUNTER (OUTPATIENT)
Dept: NEUROLOGY | Facility: HOSPITAL | Age: 59
End: 2024-09-09
Payer: COMMERCIAL

## 2024-09-10 ENCOUNTER — APPOINTMENT (OUTPATIENT)
Dept: ORTHOPEDIC SURGERY | Facility: CLINIC | Age: 59
End: 2024-09-10
Payer: MEDICARE

## 2024-09-24 ENCOUNTER — APPOINTMENT (OUTPATIENT)
Dept: ORTHOPEDIC SURGERY | Facility: CLINIC | Age: 59
End: 2024-09-24
Payer: COMMERCIAL

## 2024-10-01 DIAGNOSIS — E11.42 TYPE 2 DIABETES MELLITUS WITH DIABETIC POLYNEUROPATHY, WITHOUT LONG-TERM CURRENT USE OF INSULIN: ICD-10-CM

## 2024-10-01 DIAGNOSIS — J44.9 CHRONIC OBSTRUCTIVE PULMONARY DISEASE, UNSPECIFIED COPD TYPE (MULTI): ICD-10-CM

## 2024-10-01 RX ORDER — TIOTROPIUM BROMIDE INHALATION SPRAY 3.12 UG/1
SPRAY, METERED RESPIRATORY (INHALATION)
Qty: 16 G | Refills: 6 | Status: SHIPPED | OUTPATIENT
Start: 2024-10-01

## 2024-10-01 RX ORDER — GABAPENTIN 800 MG/1
800 TABLET ORAL 4 TIMES DAILY
Qty: 120 TABLET | Refills: 0 | Status: SHIPPED | OUTPATIENT
Start: 2024-10-01

## 2024-10-02 ENCOUNTER — APPOINTMENT (OUTPATIENT)
Dept: PRIMARY CARE | Facility: CLINIC | Age: 59
End: 2024-10-02
Payer: COMMERCIAL

## 2024-10-02 ENCOUNTER — TELEPHONE (OUTPATIENT)
Dept: PRIMARY CARE | Facility: CLINIC | Age: 59
End: 2024-10-02

## 2024-10-02 ASSESSMENT — ENCOUNTER SYMPTOMS
DIZZINESS: 0
FATIGUE: 0
HEADACHES: 0
POLYDIPSIA: 1
BLACKOUTS: 0
POLYPHAGIA: 0
WEIGHT LOSS: 0
BLURRED VISION: 0
VISUAL CHANGE: 1
SEIZURES: 0
SPEECH DIFFICULTY: 0
WEAKNESS: 0
CONFUSION: 0
NERVOUS/ANXIOUS: 1
HUNGER: 0
SWEATS: 1
TREMORS: 0

## 2024-10-04 ENCOUNTER — APPOINTMENT (OUTPATIENT)
Dept: RADIOLOGY | Facility: HOSPITAL | Age: 59
End: 2024-10-04
Payer: MEDICARE

## 2024-10-04 ENCOUNTER — APPOINTMENT (OUTPATIENT)
Dept: CARDIOLOGY | Facility: HOSPITAL | Age: 59
End: 2024-10-04
Payer: MEDICARE

## 2024-10-04 ENCOUNTER — HOSPITAL ENCOUNTER (OUTPATIENT)
Facility: HOSPITAL | Age: 59
Setting detail: OBSERVATION
Discharge: HOME | End: 2024-10-05
Attending: EMERGENCY MEDICINE | Admitting: INTERNAL MEDICINE
Payer: MEDICARE

## 2024-10-04 DIAGNOSIS — G89.29 CHRONIC BILATERAL LOW BACK PAIN WITH BILATERAL SCIATICA: ICD-10-CM

## 2024-10-04 DIAGNOSIS — R53.1 GENERALIZED WEAKNESS: ICD-10-CM

## 2024-10-04 DIAGNOSIS — M54.41 CHRONIC BILATERAL LOW BACK PAIN WITH BILATERAL SCIATICA: ICD-10-CM

## 2024-10-04 DIAGNOSIS — R29.6 REPEATED FALLS: ICD-10-CM

## 2024-10-04 DIAGNOSIS — J01.00 ACUTE NON-RECURRENT MAXILLARY SINUSITIS: ICD-10-CM

## 2024-10-04 DIAGNOSIS — E87.6 HYPOKALEMIA: ICD-10-CM

## 2024-10-04 DIAGNOSIS — E83.42 HYPOMAGNESEMIA: Primary | ICD-10-CM

## 2024-10-04 DIAGNOSIS — M54.42 CHRONIC BILATERAL LOW BACK PAIN WITH BILATERAL SCIATICA: ICD-10-CM

## 2024-10-04 PROBLEM — J01.40 ACUTE NON-RECURRENT PANSINUSITIS: Status: ACTIVE | Noted: 2024-10-04

## 2024-10-04 LAB
ALBUMIN SERPL BCP-MCNC: 4.5 G/DL (ref 3.4–5)
ALP SERPL-CCNC: 98 U/L (ref 33–136)
ALT SERPL W P-5'-P-CCNC: 15 U/L (ref 7–45)
ANION GAP SERPL CALC-SCNC: 16 MMOL/L (ref 10–20)
AST SERPL W P-5'-P-CCNC: 16 U/L (ref 9–39)
BASOPHILS # BLD AUTO: 0.04 X10*3/UL (ref 0–0.1)
BASOPHILS NFR BLD AUTO: 0.5 %
BILIRUB SERPL-MCNC: 0.6 MG/DL (ref 0–1.2)
BUN SERPL-MCNC: 15 MG/DL (ref 6–23)
CALCIUM SERPL-MCNC: 10 MG/DL (ref 8.6–10.3)
CARDIAC TROPONIN I PNL SERPL HS: 5 NG/L (ref 0–13)
CARDIAC TROPONIN I PNL SERPL HS: 6 NG/L (ref 0–13)
CHLORIDE SERPL-SCNC: 99 MMOL/L (ref 98–107)
CO2 SERPL-SCNC: 26 MMOL/L (ref 21–32)
CREAT SERPL-MCNC: 0.77 MG/DL (ref 0.5–1.05)
CRP SERPL-MCNC: 1.98 MG/DL
EGFRCR SERPLBLD CKD-EPI 2021: 88 ML/MIN/1.73M*2
EOSINOPHIL # BLD AUTO: 0.03 X10*3/UL (ref 0–0.7)
EOSINOPHIL NFR BLD AUTO: 0.4 %
ERYTHROCYTE [DISTWIDTH] IN BLOOD BY AUTOMATED COUNT: 13.2 % (ref 11.5–14.5)
ERYTHROCYTE [SEDIMENTATION RATE] IN BLOOD BY WESTERGREN METHOD: 21 MM/H (ref 0–30)
GLUCOSE BLD MANUAL STRIP-MCNC: 104 MG/DL (ref 74–99)
GLUCOSE BLD MANUAL STRIP-MCNC: 96 MG/DL (ref 74–99)
GLUCOSE SERPL-MCNC: 151 MG/DL (ref 74–99)
HCT VFR BLD AUTO: 37.9 % (ref 36–46)
HGB BLD-MCNC: 13.3 G/DL (ref 12–16)
IMM GRANULOCYTES # BLD AUTO: 0.03 X10*3/UL (ref 0–0.7)
IMM GRANULOCYTES NFR BLD AUTO: 0.4 % (ref 0–0.9)
LYMPHOCYTES # BLD AUTO: 1.02 X10*3/UL (ref 1.2–4.8)
LYMPHOCYTES NFR BLD AUTO: 12.2 %
MAGNESIUM SERPL-MCNC: 1.43 MG/DL (ref 1.6–2.4)
MCH RBC QN AUTO: 32.8 PG (ref 26–34)
MCHC RBC AUTO-ENTMCNC: 35.1 G/DL (ref 32–36)
MCV RBC AUTO: 93 FL (ref 80–100)
MONOCYTES # BLD AUTO: 0.48 X10*3/UL (ref 0.1–1)
MONOCYTES NFR BLD AUTO: 5.7 %
NEUTROPHILS # BLD AUTO: 6.75 X10*3/UL (ref 1.2–7.7)
NEUTROPHILS NFR BLD AUTO: 80.8 %
NRBC BLD-RTO: 0 /100 WBCS (ref 0–0)
PLATELET # BLD AUTO: 312 X10*3/UL (ref 150–450)
POTASSIUM SERPL-SCNC: 3.1 MMOL/L (ref 3.5–5.3)
PROT SERPL-MCNC: 8 G/DL (ref 6.4–8.2)
RBC # BLD AUTO: 4.06 X10*6/UL (ref 4–5.2)
SARS-COV-2 RNA RESP QL NAA+PROBE: NOT DETECTED
SODIUM SERPL-SCNC: 138 MMOL/L (ref 136–145)
TSH SERPL-ACNC: 0.91 MIU/L (ref 0.44–3.98)
WBC # BLD AUTO: 8.4 X10*3/UL (ref 4.4–11.3)

## 2024-10-04 PROCEDURE — 84075 ASSAY ALKALINE PHOSPHATASE: CPT | Performed by: EMERGENCY MEDICINE

## 2024-10-04 PROCEDURE — 2500000001 HC RX 250 WO HCPCS SELF ADMINISTERED DRUGS (ALT 637 FOR MEDICARE OP): Performed by: EMERGENCY MEDICINE

## 2024-10-04 PROCEDURE — 86140 C-REACTIVE PROTEIN: CPT | Performed by: INTERNAL MEDICINE

## 2024-10-04 PROCEDURE — 85025 COMPLETE CBC W/AUTO DIFF WBC: CPT | Performed by: EMERGENCY MEDICINE

## 2024-10-04 PROCEDURE — 99223 1ST HOSP IP/OBS HIGH 75: CPT | Performed by: INTERNAL MEDICINE

## 2024-10-04 PROCEDURE — 2500000001 HC RX 250 WO HCPCS SELF ADMINISTERED DRUGS (ALT 637 FOR MEDICARE OP): Performed by: INTERNAL MEDICINE

## 2024-10-04 PROCEDURE — 36415 COLL VENOUS BLD VENIPUNCTURE: CPT | Performed by: EMERGENCY MEDICINE

## 2024-10-04 PROCEDURE — 2500000004 HC RX 250 GENERAL PHARMACY W/ HCPCS (ALT 636 FOR OP/ED): Performed by: INTERNAL MEDICINE

## 2024-10-04 PROCEDURE — 2500000002 HC RX 250 W HCPCS SELF ADMINISTERED DRUGS (ALT 637 FOR MEDICARE OP, ALT 636 FOR OP/ED): Performed by: EMERGENCY MEDICINE

## 2024-10-04 PROCEDURE — 70450 CT HEAD/BRAIN W/O DYE: CPT | Performed by: RADIOLOGY

## 2024-10-04 PROCEDURE — 93005 ELECTROCARDIOGRAM TRACING: CPT

## 2024-10-04 PROCEDURE — 2500000004 HC RX 250 GENERAL PHARMACY W/ HCPCS (ALT 636 FOR OP/ED): Performed by: EMERGENCY MEDICINE

## 2024-10-04 PROCEDURE — 84443 ASSAY THYROID STIM HORMONE: CPT | Performed by: INTERNAL MEDICINE

## 2024-10-04 PROCEDURE — 83735 ASSAY OF MAGNESIUM: CPT | Performed by: EMERGENCY MEDICINE

## 2024-10-04 PROCEDURE — 87635 SARS-COV-2 COVID-19 AMP PRB: CPT | Performed by: INTERNAL MEDICINE

## 2024-10-04 PROCEDURE — G0378 HOSPITAL OBSERVATION PER HR: HCPCS

## 2024-10-04 PROCEDURE — 72131 CT LUMBAR SPINE W/O DYE: CPT

## 2024-10-04 PROCEDURE — 96372 THER/PROPH/DIAG INJ SC/IM: CPT | Performed by: INTERNAL MEDICINE

## 2024-10-04 PROCEDURE — 84520 ASSAY OF UREA NITROGEN: CPT | Performed by: EMERGENCY MEDICINE

## 2024-10-04 PROCEDURE — 82947 ASSAY GLUCOSE BLOOD QUANT: CPT

## 2024-10-04 PROCEDURE — 70450 CT HEAD/BRAIN W/O DYE: CPT

## 2024-10-04 PROCEDURE — 96366 THER/PROPH/DIAG IV INF ADDON: CPT

## 2024-10-04 PROCEDURE — 72131 CT LUMBAR SPINE W/O DYE: CPT | Performed by: STUDENT IN AN ORGANIZED HEALTH CARE EDUCATION/TRAINING PROGRAM

## 2024-10-04 PROCEDURE — 85652 RBC SED RATE AUTOMATED: CPT | Performed by: INTERNAL MEDICINE

## 2024-10-04 PROCEDURE — 84484 ASSAY OF TROPONIN QUANT: CPT | Performed by: EMERGENCY MEDICINE

## 2024-10-04 PROCEDURE — 96375 TX/PRO/DX INJ NEW DRUG ADDON: CPT

## 2024-10-04 PROCEDURE — 99285 EMERGENCY DEPT VISIT HI MDM: CPT | Mod: 25

## 2024-10-04 PROCEDURE — 96365 THER/PROPH/DIAG IV INF INIT: CPT | Mod: 59

## 2024-10-04 RX ORDER — ACETAMINOPHEN 650 MG/1
650 SUPPOSITORY RECTAL EVERY 4 HOURS PRN
Status: DISCONTINUED | OUTPATIENT
Start: 2024-10-04 | End: 2024-10-05 | Stop reason: HOSPADM

## 2024-10-04 RX ORDER — ATORVASTATIN CALCIUM 80 MG/1
80 TABLET, FILM COATED ORAL DAILY
Status: DISCONTINUED | OUTPATIENT
Start: 2024-10-05 | End: 2024-10-05 | Stop reason: HOSPADM

## 2024-10-04 RX ORDER — TALC
3 POWDER (GRAM) TOPICAL NIGHTLY PRN
Status: DISCONTINUED | OUTPATIENT
Start: 2024-10-04 | End: 2024-10-05 | Stop reason: HOSPADM

## 2024-10-04 RX ORDER — POLYETHYLENE GLYCOL 3350 17 G/17G
17 POWDER, FOR SOLUTION ORAL DAILY PRN
Status: DISCONTINUED | OUTPATIENT
Start: 2024-10-04 | End: 2024-10-05 | Stop reason: HOSPADM

## 2024-10-04 RX ORDER — FLUTICASONE PROPIONATE 50 MCG
2 SPRAY, SUSPENSION (ML) NASAL DAILY
Status: DISCONTINUED | OUTPATIENT
Start: 2024-10-05 | End: 2024-10-05 | Stop reason: HOSPADM

## 2024-10-04 RX ORDER — PANTOPRAZOLE SODIUM 40 MG/1
40 TABLET, DELAYED RELEASE ORAL
Status: DISCONTINUED | OUTPATIENT
Start: 2024-10-05 | End: 2024-10-05 | Stop reason: HOSPADM

## 2024-10-04 RX ORDER — ALBUTEROL SULFATE 90 UG/1
2 INHALANT RESPIRATORY (INHALATION) EVERY 6 HOURS PRN
Status: DISCONTINUED | OUTPATIENT
Start: 2024-10-04 | End: 2024-10-05 | Stop reason: HOSPADM

## 2024-10-04 RX ORDER — CALCIUM CARBONATE 300MG(750)
400 TABLET,CHEWABLE ORAL DAILY
Qty: 30 TABLET | Refills: 0 | Status: SHIPPED | OUTPATIENT
Start: 2024-10-04 | End: 2024-11-03

## 2024-10-04 RX ORDER — DEXTROSE MONOHYDRATE, SODIUM CHLORIDE, AND POTASSIUM CHLORIDE 50; 1.49; 9 G/1000ML; G/1000ML; G/1000ML
100 INJECTION, SOLUTION INTRAVENOUS CONTINUOUS
Status: DISCONTINUED | OUTPATIENT
Start: 2024-10-04 | End: 2024-10-05

## 2024-10-04 RX ORDER — ALPRAZOLAM 0.25 MG/1
0.25 TABLET ORAL ONCE
Status: COMPLETED | OUTPATIENT
Start: 2024-10-04 | End: 2024-10-04

## 2024-10-04 RX ORDER — POTASSIUM CHLORIDE 20 MEQ/1
40 TABLET, EXTENDED RELEASE ORAL DAILY
Status: DISCONTINUED | OUTPATIENT
Start: 2024-10-04 | End: 2024-10-05 | Stop reason: HOSPADM

## 2024-10-04 RX ORDER — ACETAMINOPHEN 160 MG/5ML
650 SOLUTION ORAL EVERY 4 HOURS PRN
Status: DISCONTINUED | OUTPATIENT
Start: 2024-10-04 | End: 2024-10-05 | Stop reason: HOSPADM

## 2024-10-04 RX ORDER — ASPIRIN 81 MG/1
81 TABLET ORAL DAILY
Status: DISCONTINUED | OUTPATIENT
Start: 2024-10-05 | End: 2024-10-05 | Stop reason: HOSPADM

## 2024-10-04 RX ORDER — ROPINIROLE 1 MG/1
2 TABLET, FILM COATED ORAL NIGHTLY
Status: DISCONTINUED | OUTPATIENT
Start: 2024-10-04 | End: 2024-10-05 | Stop reason: HOSPADM

## 2024-10-04 RX ORDER — MAGNESIUM SULFATE HEPTAHYDRATE 40 MG/ML
2 INJECTION, SOLUTION INTRAVENOUS ONCE
Status: COMPLETED | OUTPATIENT
Start: 2024-10-04 | End: 2024-10-04

## 2024-10-04 RX ORDER — ENOXAPARIN SODIUM 100 MG/ML
40 INJECTION SUBCUTANEOUS EVERY 24 HOURS
Status: DISCONTINUED | OUTPATIENT
Start: 2024-10-04 | End: 2024-10-05 | Stop reason: HOSPADM

## 2024-10-04 RX ORDER — ONDANSETRON HYDROCHLORIDE 2 MG/ML
4 INJECTION, SOLUTION INTRAVENOUS EVERY 8 HOURS PRN
Status: DISCONTINUED | OUTPATIENT
Start: 2024-10-04 | End: 2024-10-05 | Stop reason: HOSPADM

## 2024-10-04 RX ORDER — PANTOPRAZOLE SODIUM 40 MG/10ML
40 INJECTION, POWDER, LYOPHILIZED, FOR SOLUTION INTRAVENOUS
Status: DISCONTINUED | OUTPATIENT
Start: 2024-10-05 | End: 2024-10-05 | Stop reason: HOSPADM

## 2024-10-04 RX ORDER — BUPRENORPHINE HYDROCHLORIDE AND NALOXONE HYDROCHLORIDE DIHYDRATE 8; 2 MG/1; MG/1
1 TABLET SUBLINGUAL 2 TIMES DAILY
Status: DISCONTINUED | OUTPATIENT
Start: 2024-10-05 | End: 2024-10-05 | Stop reason: HOSPADM

## 2024-10-04 RX ORDER — DULOXETIN HYDROCHLORIDE 20 MG/1
20 CAPSULE, DELAYED RELEASE ORAL DAILY
COMMUNITY

## 2024-10-04 RX ORDER — ONDANSETRON 4 MG/1
4 TABLET, FILM COATED ORAL EVERY 8 HOURS PRN
Status: DISCONTINUED | OUTPATIENT
Start: 2024-10-04 | End: 2024-10-05 | Stop reason: HOSPADM

## 2024-10-04 RX ORDER — ACETAMINOPHEN 325 MG/1
650 TABLET ORAL EVERY 4 HOURS PRN
Status: DISCONTINUED | OUTPATIENT
Start: 2024-10-04 | End: 2024-10-05 | Stop reason: HOSPADM

## 2024-10-04 RX ORDER — LOSARTAN POTASSIUM 100 MG/1
100 TABLET ORAL DAILY
Status: DISCONTINUED | OUTPATIENT
Start: 2024-10-05 | End: 2024-10-05 | Stop reason: HOSPADM

## 2024-10-04 RX ORDER — QUETIAPINE FUMARATE 25 MG/1
25 TABLET, FILM COATED ORAL NIGHTLY
Status: DISCONTINUED | OUTPATIENT
Start: 2024-10-04 | End: 2024-10-05 | Stop reason: HOSPADM

## 2024-10-04 RX ORDER — DULOXETIN HYDROCHLORIDE 20 MG/1
20 CAPSULE, DELAYED RELEASE ORAL DAILY
Status: DISCONTINUED | OUTPATIENT
Start: 2024-10-05 | End: 2024-10-05 | Stop reason: HOSPADM

## 2024-10-04 SDOH — SOCIAL STABILITY: SOCIAL INSECURITY
WITHIN THE LAST YEAR, HAVE TO BEEN RAPED OR FORCED TO HAVE ANY KIND OF SEXUAL ACTIVITY BY YOUR PARTNER OR EX-PARTNER?: NO

## 2024-10-04 SDOH — ECONOMIC STABILITY: FOOD INSECURITY: WITHIN THE PAST 12 MONTHS, YOU WORRIED THAT YOUR FOOD WOULD RUN OUT BEFORE YOU GOT MONEY TO BUY MORE.: NEVER TRUE

## 2024-10-04 SDOH — SOCIAL STABILITY: SOCIAL INSECURITY
WITHIN THE LAST YEAR, HAVE YOU BEEN KICKED, HIT, SLAPPED, OR OTHERWISE PHYSICALLY HURT BY YOUR PARTNER OR EX-PARTNER?: NO

## 2024-10-04 SDOH — ECONOMIC STABILITY: FOOD INSECURITY: WITHIN THE PAST 12 MONTHS, THE FOOD YOU BOUGHT JUST DIDN'T LAST AND YOU DIDN'T HAVE MONEY TO GET MORE.: NEVER TRUE

## 2024-10-04 SDOH — SOCIAL STABILITY: SOCIAL INSECURITY: WITHIN THE LAST YEAR, HAVE YOU BEEN HUMILIATED OR EMOTIONALLY ABUSED IN OTHER WAYS BY YOUR PARTNER OR EX-PARTNER?: NO

## 2024-10-04 SDOH — HEALTH STABILITY: PHYSICAL HEALTH: ON AVERAGE, HOW MANY MINUTES DO YOU ENGAGE IN EXERCISE AT THIS LEVEL?: 0 MIN

## 2024-10-04 SDOH — SOCIAL STABILITY: SOCIAL INSECURITY: HAS ANYONE EVER THREATENED TO HURT YOUR FAMILY OR YOUR PETS?: NO

## 2024-10-04 SDOH — SOCIAL STABILITY: SOCIAL INSECURITY: WITHIN THE LAST YEAR, HAVE YOU BEEN AFRAID OF YOUR PARTNER OR EX-PARTNER?: NO

## 2024-10-04 SDOH — SOCIAL STABILITY: SOCIAL INSECURITY: ARE YOU OR HAVE YOU BEEN THREATENED OR ABUSED PHYSICALLY, EMOTIONALLY, OR SEXUALLY BY ANYONE?: NO

## 2024-10-04 SDOH — SOCIAL STABILITY: SOCIAL INSECURITY: DO YOU FEEL ANYONE HAS EXPLOITED OR TAKEN ADVANTAGE OF YOU FINANCIALLY OR OF YOUR PERSONAL PROPERTY?: NO

## 2024-10-04 SDOH — SOCIAL STABILITY: SOCIAL INSECURITY: ABUSE: ADULT

## 2024-10-04 SDOH — ECONOMIC STABILITY: INCOME INSECURITY: IN THE LAST 12 MONTHS, WAS THERE A TIME WHEN YOU WERE NOT ABLE TO PAY THE MORTGAGE OR RENT ON TIME?: PATIENT DECLINED

## 2024-10-04 SDOH — SOCIAL STABILITY: SOCIAL INSECURITY: DOES ANYONE TRY TO KEEP YOU FROM HAVING/CONTACTING OTHER FRIENDS OR DOING THINGS OUTSIDE YOUR HOME?: NO

## 2024-10-04 SDOH — SOCIAL STABILITY: SOCIAL INSECURITY: ARE THERE ANY APPARENT SIGNS OF INJURIES/BEHAVIORS THAT COULD BE RELATED TO ABUSE/NEGLECT?: NO

## 2024-10-04 SDOH — SOCIAL STABILITY: SOCIAL INSECURITY: DO YOU FEEL UNSAFE GOING BACK TO THE PLACE WHERE YOU ARE LIVING?: NO

## 2024-10-04 SDOH — ECONOMIC STABILITY: TRANSPORTATION INSECURITY
IN THE PAST 12 MONTHS, HAS THE LACK OF TRANSPORTATION KEPT YOU FROM MEDICAL APPOINTMENTS OR FROM GETTING MEDICATIONS?: PATIENT DECLINED

## 2024-10-04 SDOH — HEALTH STABILITY: PHYSICAL HEALTH: ON AVERAGE, HOW MANY DAYS PER WEEK DO YOU ENGAGE IN MODERATE TO STRENUOUS EXERCISE (LIKE A BRISK WALK)?: 0 DAYS

## 2024-10-04 SDOH — ECONOMIC STABILITY: INCOME INSECURITY
IN THE PAST 12 MONTHS, HAS THE ELECTRIC, GAS, OIL, OR WATER COMPANY THREATENED TO SHUT OFF SERVICE IN YOUR HOME?: PATIENT DECLINED

## 2024-10-04 SDOH — ECONOMIC STABILITY: HOUSING INSECURITY: AT ANY TIME IN THE PAST 12 MONTHS, WERE YOU HOMELESS OR LIVING IN A SHELTER (INCLUDING NOW)?: PATIENT DECLINED

## 2024-10-04 SDOH — SOCIAL STABILITY: SOCIAL INSECURITY: WERE YOU ABLE TO COMPLETE ALL THE BEHAVIORAL HEALTH SCREENINGS?: YES

## 2024-10-04 SDOH — SOCIAL STABILITY: SOCIAL INSECURITY: HAVE YOU HAD ANY THOUGHTS OF HARMING ANYONE ELSE?: NO

## 2024-10-04 SDOH — SOCIAL STABILITY: SOCIAL INSECURITY: HAVE YOU HAD THOUGHTS OF HARMING ANYONE ELSE?: NO

## 2024-10-04 ASSESSMENT — ACTIVITIES OF DAILY LIVING (ADL)
DRESSING YOURSELF: INDEPENDENT
HEARING - LEFT EAR: HEARING AID
LACK_OF_TRANSPORTATION: PATIENT DECLINED
WALKS IN HOME: NEEDS ASSISTANCE
JUDGMENT_ADEQUATE_SAFELY_COMPLETE_DAILY_ACTIVITIES: YES
GROOMING: INDEPENDENT
BATHING: INDEPENDENT
FEEDING YOURSELF: INDEPENDENT
HEARING - RIGHT EAR: HEARING AID
ADEQUATE_TO_COMPLETE_ADL: YES
TOILETING: INDEPENDENT
PATIENT'S MEMORY ADEQUATE TO SAFELY COMPLETE DAILY ACTIVITIES?: YES

## 2024-10-04 ASSESSMENT — LIFESTYLE VARIABLES
EVER FELT BAD OR GUILTY ABOUT YOUR DRINKING: NO
TOTAL SCORE: 0
EVER HAD A DRINK FIRST THING IN THE MORNING TO STEADY YOUR NERVES TO GET RID OF A HANGOVER: NO
AUDIT-C TOTAL SCORE: 0
HAVE YOU EVER FELT YOU SHOULD CUT DOWN ON YOUR DRINKING: NO
HAVE PEOPLE ANNOYED YOU BY CRITICIZING YOUR DRINKING: NO
HOW OFTEN DO YOU HAVE A DRINK CONTAINING ALCOHOL: NEVER
AUDIT-C TOTAL SCORE: 0
SKIP TO QUESTIONS 9-10: 1
HOW OFTEN DO YOU HAVE 6 OR MORE DRINKS ON ONE OCCASION: NEVER
HOW MANY STANDARD DRINKS CONTAINING ALCOHOL DO YOU HAVE ON A TYPICAL DAY: PATIENT DOES NOT DRINK

## 2024-10-04 ASSESSMENT — COGNITIVE AND FUNCTIONAL STATUS - GENERAL
MOVING TO AND FROM BED TO CHAIR: A LITTLE
MOVING FROM LYING ON BACK TO SITTING ON SIDE OF FLAT BED WITH BEDRAILS: A LITTLE
DAILY ACTIVITIY SCORE: 23
CLIMB 3 TO 5 STEPS WITH RAILING: A LITTLE
PATIENT BASELINE BEDBOUND: NO
DRESSING REGULAR UPPER BODY CLOTHING: A LITTLE
MOBILITY SCORE: 18
STANDING UP FROM CHAIR USING ARMS: A LITTLE
WALKING IN HOSPITAL ROOM: A LITTLE
TURNING FROM BACK TO SIDE WHILE IN FLAT BAD: A LITTLE

## 2024-10-04 ASSESSMENT — PAIN SCALES - GENERAL
PAINLEVEL_OUTOF10: 3
PAINLEVEL_OUTOF10: 5 - MODERATE PAIN
PAINLEVEL_OUTOF10: 0 - NO PAIN

## 2024-10-04 ASSESSMENT — PAIN - FUNCTIONAL ASSESSMENT
PAIN_FUNCTIONAL_ASSESSMENT: 0-10

## 2024-10-04 ASSESSMENT — COLUMBIA-SUICIDE SEVERITY RATING SCALE - C-SSRS
2. HAVE YOU ACTUALLY HAD ANY THOUGHTS OF KILLING YOURSELF?: NO
1. IN THE PAST MONTH, HAVE YOU WISHED YOU WERE DEAD OR WISHED YOU COULD GO TO SLEEP AND NOT WAKE UP?: NO
6. HAVE YOU EVER DONE ANYTHING, STARTED TO DO ANYTHING, OR PREPARED TO DO ANYTHING TO END YOUR LIFE?: NO

## 2024-10-04 ASSESSMENT — PATIENT HEALTH QUESTIONNAIRE - PHQ9
2. FEELING DOWN, DEPRESSED OR HOPELESS: NOT AT ALL
1. LITTLE INTEREST OR PLEASURE IN DOING THINGS: NOT AT ALL
SUM OF ALL RESPONSES TO PHQ9 QUESTIONS 1 & 2: 0

## 2024-10-04 ASSESSMENT — PAIN DESCRIPTION - LOCATION: LOCATION: HEAD

## 2024-10-04 NOTE — ED PROVIDER NOTES
HPI   Chief Complaint   Patient presents with    Weakness, Gen       HPI: 59-year-old female presents stating that for the past 4 days or so she has been feeling like she has got generalized weakness.  She has not been taking her B12.  She denies any chest pain.  No shortness of breath.  No headache or vision changes.  No numbness tingling or weakness.  She came from home.  She states that this has happened in the past and usually her B12 levels are low.  No swelling in her legs.  No numbness tingling or focal weakness.  No blood in her stools no dark tarry stools.  No Difficulty speaking or swallowing    Family HX: Denies any significant/pertinent family history.  Social Hx: Denies ETOH or drug use.  Review of systems:  Gen.: No weight loss, fatigue, anorexia, insomnia, fever.   Eyes: No vision loss, double vision, drainage, eye pain.   ENT: No pharyngitis, dry mouth.   Cardiac: No chest pain, palpitations, syncope, near syncope.   Pulmonary: No shortness of breath, cough, hemoptysis.   Heme/lymph: No swollen glands, fever, bleeding.   GI: No abdominal pain, change in bowel habits, melena, hematemesis, hematochezia, nausea, vomiting, diarrhea.   : No discharge, dysuria, frequency, urgency, hematuria.   Musculoskeletal: No limb pain, joint pain, joint swelling.   Skin: No rashes.   Psych: No depression, anxiety, suicidality, homicidality.   Review of systems is otherwise negative unless stated above or in history of present illness.    Physical Exam:    Appearance: Alert, oriented , cooperative,  in no acute distress. Well nourished & well hydrated.    Skin: Intact,  dry skin, no lesions, rash, petechiae or purpura.     Eyes: PERRLA, EOMs intact,  Conjunctiva pink with no redness or exudates. Eyelids without lesions. No scleral icterus.     ENT: Hearing grossly intact. External auditory canals patent, tympanic membranes intact with visible landmarks. Nares patent, mucus membranes moist. Dentition without lesions.  Pharynx clear, uvula midline.     Neck: Supple, without meningismus. Thyroid not palpable. Trachea at midline. No lymphadenopathy.    Pulmonary: Clear bilaterally with good chest wall excursion. No rales, rhonchi or wheezing. No accessory muscle use or stridor.    Cardiac: Normal S1, S2 without murmur, rub, gallop or extrasystole. No JVD, Carotids without bruits.    Abdomen: Soft, nontender, active bowel sounds.  No palpable organomegaly.  No rebound or guarding.  No CVA tenderness.    Genitourinary: Exam deferred.    Musculoskeletal: Full range of motion. no pain, edema, or deformity. Pulses full and equal. No cyanosis, clubbing, or edema.    Neurological:  Cranial nerves II through XII are grossly intact, finger-nose touch is normal, normal sensation, no weakness, no focal findings identified.  NIH stroke scale is 0.  She has a normal test of skew.    Psychiatric: Appropriate mood and affect.     Medical Decision-Making:  Testing: EKG was obtained which is interpreted by me shows a sinus rhythm rate of 69 borderline slightly long QT at 499 but no evidence of obvious ST elevations or T wave inversions to indicate acute ischemia or infarct.  Patient was found to have a low potassium as well as a low magnesium both of which were repleted.  Otherwise patient has a normal troponin she has a normal white count without signs of leukocytosis.  She is not anemic.CT of her head was read as no evidence of acute cortical infarct or intracranial hemorrhage.  Patient states that she gets her B12 over-the-counter.  I recommended that she restart her B12.  She is tolerating p.o.  Therefore at this time she ambulates without difficulty and she will be discharged home.  Patient was to be discharged home.  However she stated that she was starting to not feel well again.  She was noted to be sweaty by the nursing staff.  Therefore we put the patient back in the bed.  I repeated the EKG which again is interpreted by me shows a sinus  rhythm rate of 61 without evidence of obvious ST elevations.  I also repeated her troponin which is again unremarkable.  At this time she was also given a dose of her Xanax as it was felt maybe this was anxiety related.  However we did give her the medication and on reevaluation she still did not feel well enough to go home therefore I had spoken to the hospitalist and the patient will be admitted  Treatment:   Reevaluation:   Plan:  Patient and family/friend/caregiver are in agreement with this plan.   Impression:   1.  Hypokalemia  2.  Hypomagnesemia    Labs Reviewed  CBC WITH AUTO DIFFERENTIAL - Abnormal     WBC                           8.4                    nRBC                          0.0                    RBC                           4.06                   Hemoglobin                    13.3                   Hematocrit                    37.9                   MCV                           93                     MCH                           32.8                   MCHC                          35.1                   RDW                           13.2                   Platelets                     312                    Neutrophils %                 80.8                   Immature Granulocytes %, Automated   0.4                    Lymphocytes %                 12.2                   Monocytes %                   5.7                    Eosinophils %                 0.4                    Basophils %                   0.5                    Neutrophils Absolute          6.75                   Immature Granulocytes Absolute, Au*   0.03                   Lymphocytes Absolute          1.02 (*)               Monocytes Absolute            0.48                   Eosinophils Absolute          0.03                   Basophils Absolute            0.04                COMPREHENSIVE METABOLIC PANEL - Abnormal     Glucose                       151 (*)                Sodium                        138                     Potassium                     3.1 (*)                Chloride                      99                     Bicarbonate                   26                     Anion Gap                     16                     Urea Nitrogen                 15                     Creatinine                    0.77                   eGFR                          88                     Calcium                       10.0                   Albumin                       4.5                    Alkaline Phosphatase          98                     Total Protein                 8.0                    AST                           16                     Bilirubin, Total              0.6                    ALT                           15                  MAGNESIUM - Abnormal     Magnesium                     1.43 (*)            TROPONIN I, HIGH SENSITIVITY - Normal     Troponin I, High Sensitivity   5                        Narrative: Less than 99th percentile of normal range cutoff-                Female and children under 18 years old <14 ng/L; Male <21 ng/L: Negative                Repeat testing should be performed if clinically indicated.                                 Female and children under 18 years old 14-50 ng/L; Male 21-50 ng/L:                Consistent with possible cardiac damage and possible increased clinical                 risk. Serial measurements may help to assess extent of myocardial damage.                                 >50 ng/L: Consistent with cardiac damage, increased clinical risk and                myocardial infarction. Serial measurements may help assess extent of                 myocardial damage.                                  NOTE: Children less than 1 year old may have higher baseline troponin                 levels and results should be interpreted in conjunction with the overall                 clinical context.                                 NOTE: Troponin I testing is performed using a different                  testing methodology at [facility] than at other                 [facility]. Direct result comparisons should only                 be made within the same method.  URINALYSIS WITH REFLEX CULTURE AND MICROSCOPIC       Narrative: The following orders were created for panel order Urinalysis with Reflex Culture and Microscopic.                Procedure                               Abnormality         Status                                   ---------                               -----------         ------                                   Urinalysis with Reflex C...[[id number]]                                                               Extra Urine Gray Tube[[id number]]                                                                                     Please view results for these tests on the individual orders.  URINALYSIS WITH REFLEX CULTURE AND MICROSCOPIC  EXTRA URINE GRAY TUBE     CT head wo IV contrast   Final Result    No evidence of acute cortical infarct or intracranial hemorrhage.          New paranasal sinus opacities on the left as detailed above          MACRO:    None          Signed by: Joseph Schoenberger 10/4/2024 1:10 PM    Dictation workstation:   [id number]                    Patient History   Past Medical History:   Diagnosis Date    Allergic 2017    Anemia     Anxiety 1985    Arthritis 2017    Cataract 2018    COPD (chronic obstructive pulmonary disease) (Multi) 2017    CTS (carpal tunnel syndrome) 2010?    Depression 1985    Diabetes mellitus (Multi) 1986    HL (hearing loss) 1998    Hypertension 1986    Personal history of nicotine dependence 08/12/2021    Personal history of nicotine dependence    Rheumatoid arthritis (Multi)     Spinal stenosis 2016    Substance abuse (Multi) 2015    Varicella     Visual impairment 1970     Past Surgical History:   Procedure Laterality Date    BACK SURGERY  2017    BREAST BIOPSY      CARPAL TUNNEL RELEASE  2013    EYE  "SURGERY  2019    HYSTERECTOMY  1990    MYOMECTOMY      OTHER SURGICAL HISTORY  07/10/2019    Tonsillectomy    OTHER SURGICAL HISTORY  07/10/2019    Back surgery    OTHER SURGICAL HISTORY  07/10/2019    Hysterectomy    OTHER SURGICAL HISTORY  07/10/2019    Carpal tunnel surgery    OTHER SURGICAL HISTORY  01/11/2022    Throat surgery    OTHER SURGICAL HISTORY  01/11/2022    Knee surgery    OTHER SURGICAL HISTORY  01/11/2022    Breast surgery    OTHER SURGICAL HISTORY  01/11/2022    Cataract surgery    OTHER SURGICAL HISTORY  01/11/2022    Toe fracture repair    PLANTAR FASCIA RELEASE  2009    XR CHEST PACEMAKER WITH FLUORO  04/15/2019    XR CHEST PACEMAKER WITH FLUORO 4/15/2019 [facility]PAT[facility]     Family History   Adopted: Yes   Problem Relation Name Age of Onset    Skin cancer Mother      Other (cardiac disorder) Father Julio C Storey (biological parent)     Alcohol abuse Father Julio C Storey (biological parent)     Diabetes Father Julio C Storey (biological parent)     Depression Father's Sister Valerie Alejandre     Diabetes Father's Sister Valerie JOHNSON Gracerosenane Andrea Gin     Hypertension Father's Sister Valerie ELIZABETH Greenfield     Ovarian cancer Sister Nhi Rodriguez      Social History     Tobacco Use    Smoking status: Every Day     Current packs/day: 0.50     Average packs/day: 0.5 packs/day for 50.1 years (25.1 ttl pk-yrs)     Types: Cigarettes     Start date: 8/14/1974    Smokeless tobacco: Never    Tobacco comments:     I was introduced to cigarettes in 5th grade and got hooked really, really quick. 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    Drug use: Not Currently     Types: \"Crack\" cocaine, Cocaine, Hydrocodone, Marijuana, Morphine, Oxycodone     Comment: I was addicted to more than one substance back then. Clean 7       Physical Exam   ED Triage Vitals [10/04/24 " 1114]   Temperature Heart Rate Respirations BP   36.8 °C (98.2 °F) 82 20 (!) 149/92      Pulse Ox Temp Source Heart Rate Source Patient Position   98 % Oral -- --      BP Location FiO2 (%)     -- --       Physical Exam      ED Course & MDM   Diagnoses as of 10/04/24 1905   Hypomagnesemia   Hypokalemia                 No data recorded     [place] Coma Scale Score: 15 (10/04/24 1355 : Maria De Jesus Mack RN)                           Medical Decision Making      Procedure  Procedures     Rylee Hagen MD  10/04/24 1444       Rylee Hagen MD  10/04/24 1907

## 2024-10-04 NOTE — H&P
History Of Present Illness  Rachael Ruiz is a 59 y.o. female presenting with generalized weakness, upper respiratory tract symptoms, and not feeling well for 2 days.  She reported some congestion, feeling warm, and generalized muscle ache.  She also feels her brain is foggy, lethargic, and poor appetite.  Patient also reported recent fall with persistent back pain.  She had recent lower back surgery.  Patient has history of bipolar disorder.    ER course: Patient was awake, alert, oriented, in no acute distress.  She was hemodynamically stable.  Labs shows hypomagnesemia 1.43 and hypokalemia 3.1.  Troponin was negative x 2.  CT head reported negative for acute intracranial pathology but did show extensive sinusitis involving the mastoid, ethmoid and maxillary on the left.  Potassium and magnesium replacement was given in the ER.  Patient was not able to ambulate without assistant.  She was diaphoretic as well.  Patient is admitted to medicine for further workup, generalized weakness, repeated falls, and extensive sinusitis.    Past Medical History  She has a past medical history of Allergic (2017), Anemia, Anxiety (1985), Arthritis (2017), Cataract (2018), COPD (chronic obstructive pulmonary disease) (Multi) (2017), CTS (carpal tunnel syndrome) (2010?), Depression (1985), Diabetes mellitus (Multi) (1986), HL (hearing loss) (1998), Hypertension (1986), Personal history of nicotine dependence (08/12/2021), Rheumatoid arthritis (Multi), Spinal stenosis (2016), Substance abuse (Multi) (2015), Varicella, and Visual impairment (1970).    Surgical History  She has a past surgical history that includes Other surgical history (07/10/2019); Other surgical history (07/10/2019); Other surgical history (07/10/2019); Other surgical history (07/10/2019); Other surgical history (01/11/2022); Other surgical history (01/11/2022); Other surgical history (01/11/2022); Other surgical history (01/11/2022); Other surgical history  "(01/11/2022); XR chest pacemaker w fluoro (04/15/2019); Back surgery (2017); Eye surgery (2019); Hysterectomy (1990); Breast biopsy; Carpal tunnel release (2013); Plantar fascia release (2009); and Myomectomy.     Social History  She reports that she has been smoking cigarettes. She started smoking about 50 years ago. She has a 25.1 pack-year smoking history. She has never used smokeless tobacco. She reports that she does not currently use alcohol. She reports that she does not currently use drugs after having used the following drugs: \"Crack\" cocaine, Cocaine, Hydrocodone, Marijuana, Morphine, and Oxycodone.    Family History  Family History   Adopted: Yes   Problem Relation Name Age of Onset    Skin cancer Mother      Other (cardiac disorder) Father Julio C Storey (biological parent)     Alcohol abuse Father Julio C Storey (biological parent)     Diabetes Father Julio C Storey (biological parent)     Depression Father's Sister Valerie Alejandre     Diabetes Father's Sister Valerie DAMICOBenny Alejandre Emre Gin     Hypertension Father's Sister Valerie ELIZABETH Greenfield     Ovarian cancer Sister Nhi Rodriguez         Allergies  Naproxen, Nsaids (non-steroidal anti-inflammatory drug), Pregabalin, Sulfa (sulfonamide antibiotics), and Tramadol    Review of Systems  10/10 points review of system were conducted, negative except as above    Physical Exam  Constitutional:       Appearance: Normal appearance. She is not ill-appearing or diaphoretic.   HENT:      Head: Atraumatic.      Nose: Congestion and rhinorrhea present.      Mouth/Throat:      Mouth: Mucous membranes are moist.   Eyes:      General: No scleral icterus.     Extraocular Movements: Extraocular movements intact.      Conjunctiva/sclera: Conjunctivae normal.      Pupils: Pupils are equal, round, and reactive to light.   Neck:      Vascular: No carotid bruit.   Cardiovascular:      Rate and Rhythm: Normal rate and regular rhythm.      Heart sounds: No murmur " heard.  Pulmonary:      Effort: No respiratory distress.      Breath sounds: No stridor. No wheezing, rhonchi or rales.   Chest:      Chest wall: No tenderness.   Abdominal:      General: There is no distension.      Palpations: There is no mass.      Tenderness: There is no abdominal tenderness. There is no right CVA tenderness, left CVA tenderness, guarding or rebound.      Hernia: No hernia is present.   Musculoskeletal:      Cervical back: Normal range of motion and neck supple. No rigidity or tenderness.      Right lower leg: No edema.      Left lower leg: No edema.      Comments: Lower back tenderness in the midline in the lumbar area no evidence of skin lesion or bulging/fluid collection.   Lymphadenopathy:      Cervical: No cervical adenopathy.   Skin:     General: Skin is warm and dry.      Findings: No lesion or rash.   Neurological:      General: No focal deficit present.      Mental Status: She is alert and oriented to person, place, and time. Mental status is at baseline.   Psychiatric:         Mood and Affect: Mood normal.         Behavior: Behavior normal.          Last Recorded Vitals  Blood pressure 146/73, pulse 64, temperature 36.8 °C (98.2 °F), temperature source Oral, resp. rate 18, height 1.524 m (5'), weight 68 kg (150 lb), SpO2 95%.    Relevant Results  Scheduled medications  ampicillin-sulbactam, 3 g, intravenous, q6h  [START ON 10/5/2024] aspirin, 81 mg, oral, Daily  [START ON 10/5/2024] atorvastatin, 80 mg, oral, Daily  enoxaparin, 40 mg, subcutaneous, q24h  [START ON 10/5/2024] fluticasone, 2 spray, Each Nostril, Daily  [START ON 10/5/2024] losartan, 100 mg, oral, Daily  methylPREDNISolone sodium succinate (PF), 125 mg, intravenous, Once  [START ON 10/5/2024] pantoprazole, 40 mg, oral, Daily before breakfast   Or  [START ON 10/5/2024] pantoprazole, 40 mg, intravenous, Daily before breakfast  potassium chloride CR, 40 mEq, oral, Daily  QUEtiapine, 25 mg, oral, Nightly  rOPINIRole, 2 mg,  oral, Nightly  [START ON 10/5/2024] tiotropium, 2 puff, inhalation, Daily      Continuous medications  potassium chloride-D5-0.9%NaCl, 100 mL/hr      PRN medications  PRN medications: acetaminophen **OR** acetaminophen **OR** acetaminophen, albuterol, melatonin, ondansetron **OR** ondansetron, polyethylene glycol        Results for orders placed or performed during the hospital encounter of 10/04/24 (from the past 24 hour(s))   CBC and Auto Differential   Result Value Ref Range    WBC 8.4 4.4 - 11.3 x10*3/uL    nRBC 0.0 0.0 - 0.0 /100 WBCs    RBC 4.06 4.00 - 5.20 x10*6/uL    Hemoglobin 13.3 12.0 - 16.0 g/dL    Hematocrit 37.9 36.0 - 46.0 %    MCV 93 80 - 100 fL    MCH 32.8 26.0 - 34.0 pg    MCHC 35.1 32.0 - 36.0 g/dL    RDW 13.2 11.5 - 14.5 %    Platelets 312 150 - 450 x10*3/uL    Neutrophils % 80.8 40.0 - 80.0 %    Immature Granulocytes %, Automated 0.4 0.0 - 0.9 %    Lymphocytes % 12.2 13.0 - 44.0 %    Monocytes % 5.7 2.0 - 10.0 %    Eosinophils % 0.4 0.0 - 6.0 %    Basophils % 0.5 0.0 - 2.0 %    Neutrophils Absolute 6.75 1.20 - 7.70 x10*3/uL    Immature Granulocytes Absolute, Automated 0.03 0.00 - 0.70 x10*3/uL    Lymphocytes Absolute 1.02 (L) 1.20 - 4.80 x10*3/uL    Monocytes Absolute 0.48 0.10 - 1.00 x10*3/uL    Eosinophils Absolute 0.03 0.00 - 0.70 x10*3/uL    Basophils Absolute 0.04 0.00 - 0.10 x10*3/uL   Comprehensive metabolic panel   Result Value Ref Range    Glucose 151 (H) 74 - 99 mg/dL    Sodium 138 136 - 145 mmol/L    Potassium 3.1 (L) 3.5 - 5.3 mmol/L    Chloride 99 98 - 107 mmol/L    Bicarbonate 26 21 - 32 mmol/L    Anion Gap 16 10 - 20 mmol/L    Urea Nitrogen 15 6 - 23 mg/dL    Creatinine 0.77 0.50 - 1.05 mg/dL    eGFR 88 >60 mL/min/1.73m*2    Calcium 10.0 8.6 - 10.3 mg/dL    Albumin 4.5 3.4 - 5.0 g/dL    Alkaline Phosphatase 98 33 - 136 U/L    Total Protein 8.0 6.4 - 8.2 g/dL    AST 16 9 - 39 U/L    Bilirubin, Total 0.6 0.0 - 1.2 mg/dL    ALT 15 7 - 45 U/L   Magnesium   Result Value Ref Range     Magnesium 1.43 (L) 1.60 - 2.40 mg/dL   Troponin I, High Sensitivity   Result Value Ref Range    Troponin I, High Sensitivity 5 0 - 13 ng/L   ECG 12 lead   Result Value Ref Range    Ventricular Rate 69 BPM    Atrial Rate 69 BPM    WY Interval 140 ms    QRS Duration 84 ms    QT Interval 466 ms    QTC Calculation(Bazett) 499 ms    P Axis 45 degrees    R Axis 57 degrees    T Axis 56 degrees    QRS Count 12 beats    Q Onset 222 ms    P Onset 152 ms    P Offset 203 ms    T Offset 455 ms    QTC Fredericia 488 ms   POCT GLUCOSE   Result Value Ref Range    POCT Glucose 96 74 - 99 mg/dL   ECG 12 lead   Result Value Ref Range    Ventricular Rate 61 BPM    Atrial Rate 61 BPM    WY Interval 148 ms    QRS Duration 82 ms    QT Interval 488 ms    QTC Calculation(Bazett) 491 ms    P Axis 42 degrees    R Axis 49 degrees    T Axis 62 degrees    QRS Count 10 beats    Q Onset 227 ms    P Onset 153 ms    P Offset 175 ms    T Offset 471 ms    QTC Fredericia 490 ms   Troponin I, High Sensitivity   Result Value Ref Range    Troponin I, High Sensitivity 6 0 - 13 ng/L   C-reactive protein   Result Value Ref Range    C-Reactive Protein 1.98 (H) <1.00 mg/dL   Sars-CoV-2 PCR   Result Value Ref Range    Coronavirus 2019, PCR Not Detected Not Detected       ECG 12 lead    Result Date: 10/4/2024  Normal sinus rhythm Normal ECG When compared with ECG of 04-OCT-2024 11:37, (unconfirmed) No significant change was found See ED provider note for full interpretation and clinical correlation    CT head wo IV contrast    Result Date: 10/4/2024  Interpreted By:  Schoenberger, Joseph, STUDY: CT HEAD WO IV CONTRAST;  10/4/2024 12:44 pm   INDICATION: Signs/Symptoms:weakness.     COMPARISON: 04/07/2024   ACCESSION NUMBER(S): [id number]   ORDERING CLINICIAN: JACOBO HERNANDEZ   TECHNIQUE: Noncontrast axial CT scan of head was performed. Angled reformats in brain and bone windows were generated. The images were reviewed in bone, brain, blood and soft tissue  windows.   FINDINGS: CSF Spaces: The ventricles, sulci and basal cisterns are within normal limits. There is no extraaxial fluid collection.   Parenchyma:  The grey-white differentiation is intact. There is no mass effect or midline shift.  There is no intracranial hemorrhage.   Calvarium: The calvarium is unremarkable.   Paranasal sinuses and mastoids: There is new dependent layering fluid in the left maxillary sinus and opacification of the middle ethmoid air cells.       No evidence of acute cortical infarct or intracranial hemorrhage.   New paranasal sinus opacities on the left as detailed above   MACRO: None   Signed by: Joseph Schoenberger 10/4/2024 1:10 PM Dictation workstation:   [id number]    ECG 12 lead    Result Date: 10/4/2024  Normal sinus rhythm Prolonged QT Abnormal ECG When compared with ECG of 08-APR-2024 00:32, No significant change was found See ED provider note for full interpretation and clinical correlation         Assessment/Plan   Assessment & Plan  Weakness generalized    Type 2 diabetes mellitus    Peripheral neuropathy    Essential hypertension, benign    COPD (chronic obstructive pulmonary disease) (Multi)    Anxiety and depression    Acute non-recurrent pansinusitis    Repeated falls    Hypokalemia    Hypomagnesemia      -Patient presented with URI found to have sinusitis including left mastoid, ethmoid and left maxillary.  -Patient also has hypokalemia, hypomagnesemia.  -Urine analysis pending.  -Reported decreased urine output, poor oral intake, and generalized muscle ache.  -Also reported recent falls with lower back pain.  -Check urine analysis and COVID test.  -Start patient on IV antibiotics for extensive sinusitis.  Will start with Unasyn.  -Flonase.  -Check ESR and C-reactive protein and TSH.  -Fall precautions.  -Continue to replace potassium with IV fluid hydration.  -Follow-up magnesium level.  -OT/PT evaluation for repeated falls.  -Obtain CT scan of the lumbar spine due to  recent fall and acute on chronic lumbar pain radiated to the right thigh.      Anders Boyer MD

## 2024-10-05 ENCOUNTER — DOCUMENTATION (OUTPATIENT)
Dept: HOME HEALTH SERVICES | Facility: HOME HEALTH | Age: 59
End: 2024-10-05
Payer: MEDICARE

## 2024-10-05 ENCOUNTER — HOME HEALTH ADMISSION (OUTPATIENT)
Dept: HOME HEALTH SERVICES | Facility: HOME HEALTH | Age: 59
End: 2024-10-05
Payer: MEDICARE

## 2024-10-05 VITALS
SYSTOLIC BLOOD PRESSURE: 143 MMHG | DIASTOLIC BLOOD PRESSURE: 71 MMHG | HEART RATE: 69 BPM | WEIGHT: 150 LBS | TEMPERATURE: 96.3 F | RESPIRATION RATE: 18 BRPM | HEIGHT: 60 IN | OXYGEN SATURATION: 95 % | BODY MASS INDEX: 29.45 KG/M2

## 2024-10-05 LAB
ANION GAP SERPL CALC-SCNC: 12 MMOL/L (ref 10–20)
ATRIAL RATE: 61 BPM
ATRIAL RATE: 69 BPM
BUN SERPL-MCNC: 14 MG/DL (ref 6–23)
CALCIUM SERPL-MCNC: 9.2 MG/DL (ref 8.6–10.3)
CHLORIDE SERPL-SCNC: 105 MMOL/L (ref 98–107)
CO2 SERPL-SCNC: 25 MMOL/L (ref 21–32)
CREAT SERPL-MCNC: 0.73 MG/DL (ref 0.5–1.05)
EGFRCR SERPLBLD CKD-EPI 2021: >90 ML/MIN/1.73M*2
ERYTHROCYTE [DISTWIDTH] IN BLOOD BY AUTOMATED COUNT: 12.9 % (ref 11.5–14.5)
GLUCOSE BLD MANUAL STRIP-MCNC: 208 MG/DL (ref 74–99)
GLUCOSE SERPL-MCNC: 204 MG/DL (ref 74–99)
HCT VFR BLD AUTO: 36.8 % (ref 36–46)
HGB BLD-MCNC: 12.5 G/DL (ref 12–16)
MAGNESIUM SERPL-MCNC: 1.66 MG/DL (ref 1.6–2.4)
MCH RBC QN AUTO: 31.8 PG (ref 26–34)
MCHC RBC AUTO-ENTMCNC: 34 G/DL (ref 32–36)
MCV RBC AUTO: 94 FL (ref 80–100)
NRBC BLD-RTO: 0 /100 WBCS (ref 0–0)
P AXIS: 42 DEGREES
P AXIS: 45 DEGREES
P OFFSET: 175 MS
P OFFSET: 203 MS
P ONSET: 152 MS
P ONSET: 153 MS
PLATELET # BLD AUTO: 320 X10*3/UL (ref 150–450)
POTASSIUM SERPL-SCNC: 3.5 MMOL/L (ref 3.5–5.3)
PR INTERVAL: 140 MS
PR INTERVAL: 148 MS
Q ONSET: 222 MS
Q ONSET: 227 MS
QRS COUNT: 10 BEATS
QRS COUNT: 12 BEATS
QRS DURATION: 82 MS
QRS DURATION: 84 MS
QT INTERVAL: 466 MS
QT INTERVAL: 488 MS
QTC CALCULATION(BAZETT): 491 MS
QTC CALCULATION(BAZETT): 499 MS
QTC FREDERICIA: 488 MS
QTC FREDERICIA: 490 MS
R AXIS: 49 DEGREES
R AXIS: 57 DEGREES
RBC # BLD AUTO: 3.93 X10*6/UL (ref 4–5.2)
SODIUM SERPL-SCNC: 138 MMOL/L (ref 136–145)
T AXIS: 56 DEGREES
T AXIS: 62 DEGREES
T OFFSET: 455 MS
T OFFSET: 471 MS
VENTRICULAR RATE: 61 BPM
VENTRICULAR RATE: 69 BPM
WBC # BLD AUTO: 8.8 X10*3/UL (ref 4.4–11.3)

## 2024-10-05 PROCEDURE — 96367 TX/PROPH/DG ADDL SEQ IV INF: CPT

## 2024-10-05 PROCEDURE — 82947 ASSAY GLUCOSE BLOOD QUANT: CPT

## 2024-10-05 PROCEDURE — G0378 HOSPITAL OBSERVATION PER HR: HCPCS

## 2024-10-05 PROCEDURE — S4991 NICOTINE PATCH NONLEGEND: HCPCS | Performed by: INTERNAL MEDICINE

## 2024-10-05 PROCEDURE — 80048 BASIC METABOLIC PNL TOTAL CA: CPT | Performed by: INTERNAL MEDICINE

## 2024-10-05 PROCEDURE — 2500000002 HC RX 250 W HCPCS SELF ADMINISTERED DRUGS (ALT 637 FOR MEDICARE OP, ALT 636 FOR OP/ED): Performed by: INTERNAL MEDICINE

## 2024-10-05 PROCEDURE — 99239 HOSP IP/OBS DSCHRG MGMT >30: CPT | Performed by: INTERNAL MEDICINE

## 2024-10-05 PROCEDURE — 36415 COLL VENOUS BLD VENIPUNCTURE: CPT | Performed by: INTERNAL MEDICINE

## 2024-10-05 PROCEDURE — 85027 COMPLETE CBC AUTOMATED: CPT | Performed by: INTERNAL MEDICINE

## 2024-10-05 PROCEDURE — 97161 PT EVAL LOW COMPLEX 20 MIN: CPT | Mod: GP | Performed by: PHYSICAL THERAPIST

## 2024-10-05 PROCEDURE — 2500000004 HC RX 250 GENERAL PHARMACY W/ HCPCS (ALT 636 FOR OP/ED): Performed by: INTERNAL MEDICINE

## 2024-10-05 PROCEDURE — 96366 THER/PROPH/DIAG IV INF ADDON: CPT

## 2024-10-05 PROCEDURE — 83735 ASSAY OF MAGNESIUM: CPT | Performed by: INTERNAL MEDICINE

## 2024-10-05 PROCEDURE — 2500000001 HC RX 250 WO HCPCS SELF ADMINISTERED DRUGS (ALT 637 FOR MEDICARE OP): Performed by: INTERNAL MEDICINE

## 2024-10-05 PROCEDURE — 2500000002 HC RX 250 W HCPCS SELF ADMINISTERED DRUGS (ALT 637 FOR MEDICARE OP, ALT 636 FOR OP/ED): Performed by: EMERGENCY MEDICINE

## 2024-10-05 PROCEDURE — 97165 OT EVAL LOW COMPLEX 30 MIN: CPT | Mod: GO

## 2024-10-05 RX ORDER — FLUTICASONE PROPIONATE 50 MCG
2 SPRAY, SUSPENSION (ML) NASAL DAILY
Qty: 16 G | Refills: 2 | Status: SHIPPED | OUTPATIENT
Start: 2024-10-06

## 2024-10-05 RX ORDER — ALPRAZOLAM 0.5 MG/1
0.5 TABLET ORAL 2 TIMES DAILY PRN
Status: DISCONTINUED | OUTPATIENT
Start: 2024-10-05 | End: 2024-10-05 | Stop reason: HOSPADM

## 2024-10-05 RX ORDER — AMOXICILLIN AND CLAVULANATE POTASSIUM 875; 125 MG/1; MG/1
1 TABLET, FILM COATED ORAL EVERY 12 HOURS SCHEDULED
Status: DISCONTINUED | OUTPATIENT
Start: 2024-10-05 | End: 2024-10-05 | Stop reason: HOSPADM

## 2024-10-05 RX ORDER — AMOXICILLIN AND CLAVULANATE POTASSIUM 875; 125 MG/1; MG/1
1 TABLET, FILM COATED ORAL EVERY 12 HOURS SCHEDULED
Status: DISCONTINUED | OUTPATIENT
Start: 2024-10-05 | End: 2024-10-05

## 2024-10-05 RX ORDER — IBUPROFEN 200 MG
1 TABLET ORAL DAILY
Status: DISCONTINUED | OUTPATIENT
Start: 2024-10-05 | End: 2024-10-05 | Stop reason: HOSPADM

## 2024-10-05 RX ORDER — POTASSIUM CHLORIDE 20 MEQ/1
20 TABLET, EXTENDED RELEASE ORAL DAILY
Qty: 5 TABLET | Refills: 0 | Status: SHIPPED | OUTPATIENT
Start: 2024-10-06 | End: 2024-10-11

## 2024-10-05 RX ORDER — AMOXICILLIN AND CLAVULANATE POTASSIUM 875; 125 MG/1; MG/1
1 TABLET, FILM COATED ORAL EVERY 12 HOURS SCHEDULED
Qty: 10 TABLET | Refills: 0 | Status: SHIPPED | OUTPATIENT
Start: 2024-10-05 | End: 2024-10-10

## 2024-10-05 RX ORDER — LANOLIN ALCOHOL/MO/W.PET/CERES
400 CREAM (GRAM) TOPICAL DAILY
Status: DISCONTINUED | OUTPATIENT
Start: 2024-10-05 | End: 2024-10-05 | Stop reason: HOSPADM

## 2024-10-05 ASSESSMENT — COGNITIVE AND FUNCTIONAL STATUS - GENERAL
DAILY ACTIVITIY SCORE: 16
DRESSING REGULAR UPPER BODY CLOTHING: A LITTLE
MOVING TO AND FROM BED TO CHAIR: A LITTLE
TURNING FROM BACK TO SIDE WHILE IN FLAT BAD: A LITTLE
WALKING IN HOSPITAL ROOM: A LITTLE
CLIMB 3 TO 5 STEPS WITH RAILING: TOTAL
STANDING UP FROM CHAIR USING ARMS: A LITTLE
DAILY ACTIVITIY SCORE: 23
MOVING FROM LYING ON BACK TO SITTING ON SIDE OF FLAT BED WITH BEDRAILS: A LITTLE
CLIMB 3 TO 5 STEPS WITH RAILING: A LITTLE
MOBILITY SCORE: 16
TURNING FROM BACK TO SIDE WHILE IN FLAT BAD: A LITTLE
DRESSING REGULAR LOWER BODY CLOTHING: A LOT
STANDING UP FROM CHAIR USING ARMS: A LITTLE
MOBILITY SCORE: 18
DRESSING REGULAR UPPER BODY CLOTHING: A LITTLE
MOVING FROM LYING ON BACK TO SITTING ON SIDE OF FLAT BED WITH BEDRAILS: A LITTLE
WALKING IN HOSPITAL ROOM: A LITTLE
PERSONAL GROOMING: A LITTLE
TOILETING: A LOT
HELP NEEDED FOR BATHING: A LOT
MOVING TO AND FROM BED TO CHAIR: A LITTLE

## 2024-10-05 ASSESSMENT — PAIN SCALES - GENERAL
PAINLEVEL_OUTOF10: 9
PAINLEVEL_OUTOF10: 10 - WORST POSSIBLE PAIN
PAINLEVEL_OUTOF10: 5 - MODERATE PAIN
PAINLEVEL_OUTOF10: 10 - WORST POSSIBLE PAIN

## 2024-10-05 ASSESSMENT — ACTIVITIES OF DAILY LIVING (ADL): BATHING_ASSISTANCE: MINIMAL

## 2024-10-05 ASSESSMENT — PAIN - FUNCTIONAL ASSESSMENT
PAIN_FUNCTIONAL_ASSESSMENT: 0-10

## 2024-10-05 NOTE — CARE PLAN
The patient's goals for the shift include      The clinical goals for the shift include Pt. pain will be monitored and minimized throughout shift    Problem: Skin  Goal: Decreased wound size/increased tissue granulation at next dressing change  Outcome: Progressing  Flowsheets (Taken 10/5/2024 1423)  Decreased wound size/increased tissue granulation at next dressing change: Promote sleep for wound healing  Goal: Participates in plan/prevention/treatment measures  Outcome: Met  Flowsheets (Taken 10/5/2024 1423)  Participates in plan/prevention/treatment measures:   Discuss with provider PT/OT consult   Elevate heels  Goal: Prevent/manage excess moisture  Outcome: Met  Goal: Prevent/minimize sheer/friction injuries  Outcome: Met  Note: Pt. Able to turn self  Goal: Promote/optimize nutrition  Outcome: Met  Flowsheets (Taken 10/5/2024 1423)  Promote/optimize nutrition: Offer water/supplements/favorite foods     Problem: Fall/Injury  Goal: Not fall by end of shift  Outcome: Met  Goal: Be free from injury by end of the shift  Outcome: Met  Goal: Use assistive devices by end of the shift  Outcome: Met  Note: Pt. Uses call light appropriately for needs  Goal: Pace activities to prevent fatigue by end of the shift  Outcome: Met     Problem: Pain  Goal: Takes deep breaths with improved pain control throughout the shift  Outcome: Progressing  Goal: Turns in bed with improved pain control throughout the shift  Outcome: Progressing  Goal: Walks with improved pain control throughout the shift  Outcome: Progressing  Goal: Performs ADL's with improved pain control throughout shift  Outcome: Progressing  Goal: Participates in PT with improved pain control throughout the shift  Outcome: Progressing  Goal: Free from opioid side effects throughout the shift  Outcome: Met  Goal: Free from acute confusion related to pain meds throughout the shift  Outcome: Met     Problem: Pain - Adult  Goal: Verbalizes/displays adequate comfort level or  baseline comfort level  Outcome: Progressing  Flowsheets (Taken 10/5/2024 1423)  Verbalizes/displays adequate comfort level or baseline comfort level:   Encourage patient to monitor pain and request assistance   Assess pain using appropriate pain scale   Implement non-pharmacological measures as appropriate and evaluate response   Consider cultural and social influences on pain and pain management   Notify Licensed Independent Practitioner if interventions unsuccessful or patient reports new pain     Problem: Safety - Adult  Goal: Free from fall injury  Outcome: Met  Flowsheets (Taken 10/5/2024 1423)  Free from fall injury: Instruct family/caregiver on patient safety     Problem: Discharge Planning  Goal: Discharge to home or other facility with appropriate resources  Outcome: Met  Flowsheets (Taken 10/5/2024 1423)  Discharge to home or other facility with appropriate resources:   Identify barriers to discharge with patient and caregiver   Identify discharge learning needs (meds, wound care, etc)   Refer to discharge planning if patient needs post-hospital services based on physician order or complex needs related to functional status, cognitive ability or social support system   Arrange for needed discharge resources and transportation as appropriate     Problem: Chronic Conditions and Co-morbidities  Goal: Patient's chronic conditions and co-morbidity symptoms are monitored and maintained or improved  Outcome: Progressing  Flowsheets (Taken 10/5/2024 1423)  Care Plan - Patient's Chronic Conditions and Co-Morbidity Symptoms are Monitored and Maintained or Improved:   Monitor and assess patient's chronic conditions and comorbid symptoms for stability, deterioration, or improvement   Collaborate with multidisciplinary team to address chronic and comorbid conditions and prevent exacerbation or deterioration   Update acute care plan with appropriate goals if chronic or comorbid symptoms are exacerbated and prevent  overall improvement and discharge

## 2024-10-05 NOTE — PROGRESS NOTES
10/05/24 1024   Discharge Planning   Expected Discharge Disposition Home H  ([facility])   Patient Choice   Provider Choice list and CMS website (https://medicare.gov/care-compare#search) for post-acute Quality and Resource Measure Data were provided and reviewed with: Patient   Patient / Family choosing to utilize agency / facility established prior to hospitalization No     Pt agreeable to [facility] at [place]. [facility] Pt chose [facility] for therapy needs at [place]. Dr Silvestre aware of internal [facility] needs. Secure chat sent to [facility] intake regarding referral and pt dc for later today.

## 2024-10-05 NOTE — PROGRESS NOTES
Occupational Therapy    Evaluation    Patient Name: Rachael Ruiz  MRN: [id number]  Department: [facility]  Room: [address] [place] [address]  Today's Date: 10/5/2024  Time Calculation  Start Time: 0811  Stop Time: 0832  Time Calculation (min): 21 min        Assessment:  OT Assessment: Pt demonstrates an ADL impairment with decreased ability to care for herself. Pt would benefit from skilled OT to address these deficits.  Prognosis: Good  Barriers to Discharge: None  Evaluation/Treatment Tolerance: Patient limited by pain  Medical Staff Made Aware: Yes  End of Session Communication: Bedside nurse  End of Session Patient Position: Up in chair, Alarm on (Call light within reach)  OT Assessment Results: Decreased ADL status, Decreased endurance, Decreased functional mobility  Prognosis: Good  Barriers to Discharge: None  Evaluation/Treatment Tolerance: Patient limited by pain  Medical Staff Made Aware: Yes  Strengths: Access to adaptive/assistive products, Support of Caregivers  Barriers to Participation: Comorbidities, Insight into problems, Premorbid level of function  Plan:  Treatment Interventions: ADL retraining, Functional transfer training, Endurance training, Equipment evaluation/education, Compensatory technique education  OT Frequency: 2 times per week  OT Discharge Recommendations: Moderate intensity level of continued care  OT Recommended Transfer Status: Assist of 1  OT - OK to Discharge: Yes (Pt ok to d/c to next level of care pending medical clearance.)  Treatment Interventions: ADL retraining, Functional transfer training, Endurance training, Equipment evaluation/education, Compensatory technique education    Subjective   Current Problem:  1. Hypomagnesemia  magnesium oxide (Mag-Ox) 400 mg tablet      2. Hypokalemia  potassium chloride CR (Klor-Con M20) 20 mEq ER tablet      3. Generalized weakness  Referral to Home Care      4. Acute non-recurrent maxillary sinusitis  amoxicillin-pot clavulanate (Augmentin) 875-125 mg  tablet    fluticasone (Flonase) 50 mcg/actuation nasal spray      5. Chronic bilateral low back pain with bilateral sciatica  Referral to Home Care      6. Repeated falls  Referral to Home Care        General:  General  Reason for Referral: ADL impairment  Referred By: Dr. Boyer (PT/OT 10/4)  Past Medical History Relevant to Rehab: includes: COPD, neuropathy, OA, depression, anxiety, anemia, DM, CTS, [place], RA, knee surgery, bipolar disorder, lumbar lami wit PSIF, spinal stenosis  Missed Visit: No  Family/Caregiver Present: No  Co-Treatment: PT  Co-Treatment Reason: To maximize pt safety while completing discipline specific evaluation  Prior to Session Communication: Bedside nurse  Patient Position Received: Bed, 3 rail up, Alarm off, not on at start of session  General Comment: Pt. is a 60yo who presented to [facility] ED on 10/4/2024 with c/o weakness x 4 days (stated she stopped taking her B12). Pt. was scheduled to be D/C home from ED but became diaphoretic and stated she did not feel well. In ED, /92, K = 3.1, Mg = 1.43, Troponin (-) x 2, EKG (-)  L-sine CT (10/4) (-) fracture, (+) known spinal surgery  Head CT (10/4) (-) acute findings  Dx: hypokalemia, hypomagnesia, sinusitis  Precautions:  Medical Precautions:  (Activity order: No resrictions)  Precautions Comment: Per EMR: High fall risk    Pain:  Pain Assessment  Pain Assessment: 0-10  0-10 (Numeric) Pain Score: 10 - Worst possible pain  Pain Type: Chronic pain  Pain Location: Back  Pain Orientation: Lower  Pain Interventions: Repositioned    Objective   Cognition:  Overall Cognitive Status: Within Functional Limits  Orientation Level: Disoriented to situation  Following Commands: Follows one step commands with increased time  Safety Judgment: Decreased awareness of need for safety precautions  Problem Solving: Assistance required to generate solutions  Attention: Exceptions to WFL  Alternating Attention: Impaired  Sustained Attention: Impaired  Other  (Comment): Pt very anxious and intermittently tearful. Difficulty maintaining time on task.  Safety/Judgement: Exceptions to WFL  Complex Functional Tasks: Moderate  Insight: Moderate  Impulsive: Mildly  Flexibility of Thought: Reduced flexibility  Planning: Reduced planning skills  Organization: Moderately disorganized  Processing Speed: Delayed      Home Living:  Home Living Comments: Pt. lives with S/O in a 2 level house with 4 JOYCE with HR. Bed/bath on 2nd floor with tub shower with a seat and grab bars. Pt. has a stair lift to 2nd floor. Laundry in basement. Pt. plans on getting stair lift to basement at some point.  Prior Function:  Prior Function Comments: Pt. stated she mostly stays on the 2nd floor. Pt. has 2 FWW (1 on 1st floor and 1 on 2nd floor). Pt. owns an electric W/C and a transport W/C. Pt. stated she amb with FWW PTA. Pt. stated she had A with LB dressing and washing her back from S/O PTA. S/O completed IADLS PTA.  Pt. reported < 5 falls in last 3 months. Pt. drove PTA.    ADL:  Eating Assistance: Independent  Grooming Assistance: Minimal  Bathing Assistance: Minimal  UE Dressing Assistance: Stand by  LE Dressing Assistance: Moderate  Toileting Assistance with Device: Maximal  Functional Assistance: Minimal  ADL Comments: Pt requires maxA to don socks and underwear on this date due to poor forward flexion. Pt requires modA to complete toileting on BSC with assist for pericare. Pt able to complete functional transfers/mobility with Javier and use of FWW.  Activity Tolerance:  Endurance: Decreased tolerance for upright activites  Bed Mobility/Transfers: Bed Mobility  Bed Mobility: Yes  Bed Mobility 1  Bed Mobility 1: Supine to sitting  Level of Assistance 1: Minimum assistance  Bed Mobility Comments 1: A to maneuver LEs over EOB and to elevate trunk from bed    Transfers  Transfer: Yes  Transfer 1  Technique 1: Sit to stand  Transfer Device 1:  (FWW)  Transfer Level of Assistance 1: Minimum  assistance  Trials/Comments 1: A for lifting, transferring weight over feet, steadying. VC's for hand placement  Transfers 2  Technique 2: Stand to sit  Transfer Device 2:  (FWW)  Transfer Level of Assistance 2: Contact guard  Trials/Comments 2: CGA for safety. Vc's for hand placement  Transfers 3  Technique 3: Stand pivot  Transfer Device 3:  (FWW)  Transfer Level of Assistance 3: Minimum assistance  Trials/Comments 3: A for balance, safety. VC's for walker placement, to remain on task    Functional Mobility:  Functional Mobility  Functional Mobility Performed: Yes (Pt completes functional mobility for household distance with FWW and Javier.)  Sitting Balance:  Static Sitting Balance  Static Sitting-Comment/Number of Minutes: Good  Dynamic Sitting Balance  Dynamic Sitting-Comments: Good  Standing Balance:  Static Standing Balance  Static Standing-Comment/Number of Minutes: Fair +  Dynamic Standing Balance  Dynamic Standing-Comments: Fair     Strength:  Strength Comments: BUE strength grossly 4-/5    Coordination:  Movements are Fluid and Coordinated: Yes   Hand Function:  Gross Grasp: Functional  Coordination: Functional  Extremities: RUE   RUE : Within Functional Limits (AROM WFL) and LUE   LUE: Within Functional Limits (AROM WFL)    Outcome Measures:[facility] Daily Activity  Putting on and taking off regular lower body clothing: A lot  Bathing (including washing, rinsing, drying): A lot  Putting on and taking off regular upper body clothing: A little  Toileting, which includes using toilet, bedpan or urinal: A lot  Taking care of personal grooming such as brushing teeth: A little  Eating Meals: None  Daily Activity - Total Score: 16    Education Documentation  Body Mechanics, taught by Leatha Gil, OT at 10/5/2024  1:17 PM.  Learner: Patient  Readiness: Acceptance  Method: Explanation  Response: Verbalizes Understanding, Needs Reinforcement    ADL Training, taught by Leatha Gil OT at 10/5/2024  1:17  PM.  Learner: Patient  Readiness: Acceptance  Method: Explanation  Response: Verbalizes Understanding, Needs Reinforcement    Education Comments  No comments found.      Goals:  Encounter Problems       Encounter Problems (Active)       OT Goals       Pt will complete LB dressing with SBA and use of AE.   (Progressing)       Start:  10/05/24    Expected End:  10/19/24            Patient will transfer to bed/chair/toilet with SBA.  (Progressing)       Start:  10/05/24    Expected End:  10/19/24            Pt will ambulate functional household distance with CGA and use of FWW to complete I/ADL task.   (Progressing)       Start:  10/05/24    Expected End:  10/19/24            Pt will improve dynamic standing balance to good level in order to complete standing grooming task.   (Progressing)       Start:  10/05/24    Expected End:  10/19/24

## 2024-10-05 NOTE — PROGRESS NOTES
10/05/24 1055   Discharge Planning   Living Arrangements Spouse/significant other   Support Systems Children;Spouse/significant other   Assistance Needed PTA - lives with significant other in a 2 level house, 4 JOYCE with HR. Bed/bath on 2nd floor. Tub shower with a seat and grab bars. Stair lift to 2nd floor. Laundry in basement. Reports plans on getting stair lift to basement at some point. Reports mostly stays on the 2nd floor. Ambulates independently with walker. Owns 2 walkers, an electric wheelchair, and a transport chair. Needs assistance with dressing her lower body and washing her back. Sigificant other does IADLS. Drives.   Type of Residence Private residence   Do you have animals or pets at home? No   Home or Post Acute Services In home services   Type of Home Care Services Home health aide;Home PT;Home OT   Expected Discharge Disposition Home H   Does the patient need discharge transport arranged? No   Patient Choice   Provider Choice list and CMS website (https://medicare.gov/care-compare#search) for post-acute Quality and Resource Measure Data were provided and reviewed with: Patient   Patient / Family choosing to utilize agency / facility established prior to hospitalization No     PT/OT working with pt this morning.[facility] pending. Pt lives with her significant other. Has PPA with [facility]. She is open to C at [place], declines SNF.

## 2024-10-05 NOTE — DISCHARGE SUMMARY
DISCHARGE DIAGNOSIS     Acute maxillary sinusitis  Hypokalemia  Hypomagnesemia  Generalized weakness  Chronic lumbar spine DJD    HOSPITAL COURSE AND DETAILS     59F with PMH of DM2 with peripheral neuropathy, HTN, B12 deficiency, DLD, COPD, bipolar disorder, chronic low back pain with sciatica, OUD on suboxone who presented with fatigue, weakness, malaise, anorexia, URI symptoms. Her CT head showed acute L maxillary and middle ethmoid sinusitis. Her CT L-spine showed stable post operative findings from prior exam, no acute findings. She was treated with IV Unasyn and IVF for sinusitis. COVID was neg. She will transition to Augmentin, and given Rx for flonase. Her potassium and magnesium were low on admission, repleted, improved, will send with PO supplement. She was seen by PT/OT, pt preferred home, will arrange for [facility] pt/ot.     Stable for dc to home. Total time spent on discharge services 32 mins.     DISCHARGE PHYSICAL EXAM     Last Recorded Vitals:  Vitals:    10/05/24 0119 10/05/24 0122 10/05/24 0744 10/05/24 0935   BP: (!) 123/102 153/72 132/79 143/71   Pulse: 67  73 69   Resp: 18 18 18    Temp: 35.3 °C (95.5 °F)  35.7 °C (96.3 °F)    TempSrc:       SpO2: 91%  95%    Weight:       Height:           Physical Exam:  GEN: healthy appearing, appears stated age, NAD  CV: RRR, no m/r/g, no LE edema  LUNGS: CTAB, no w/r/c  ABD: soft, NT, ND, NBS  SKIN: no rashes  MSK; no gross deformities, normal joints  NEURO: A+Ox3, no FND  PSYCH: appropriate mood, affect      PERTINENT LABS AND IMAGING     Results for orders placed or performed during the hospital encounter of 10/04/24 (from the past 96 hour(s))   CBC and Auto Differential   Result Value Ref Range    WBC 8.4 4.4 - 11.3 x10*3/uL    nRBC 0.0 0.0 - 0.0 /100 WBCs    RBC 4.06 4.00 - 5.20 x10*6/uL    Hemoglobin 13.3 12.0 - 16.0 g/dL    Hematocrit 37.9 36.0 - 46.0 %    MCV 93 80 - 100 fL    MCH 32.8 26.0 - 34.0 pg    MCHC 35.1 32.0 - 36.0 g/dL    RDW 13.2 11.5 - 14.5  %    Platelets 312 150 - 450 x10*3/uL    Neutrophils % 80.8 40.0 - 80.0 %    Immature Granulocytes %, Automated 0.4 0.0 - 0.9 %    Lymphocytes % 12.2 13.0 - 44.0 %    Monocytes % 5.7 2.0 - 10.0 %    Eosinophils % 0.4 0.0 - 6.0 %    Basophils % 0.5 0.0 - 2.0 %    Neutrophils Absolute 6.75 1.20 - 7.70 x10*3/uL    Immature Granulocytes Absolute, Automated 0.03 0.00 - 0.70 x10*3/uL    Lymphocytes Absolute 1.02 (L) 1.20 - 4.80 x10*3/uL    Monocytes Absolute 0.48 0.10 - 1.00 x10*3/uL    Eosinophils Absolute 0.03 0.00 - 0.70 x10*3/uL    Basophils Absolute 0.04 0.00 - 0.10 x10*3/uL   Comprehensive metabolic panel   Result Value Ref Range    Glucose 151 (H) 74 - 99 mg/dL    Sodium 138 136 - 145 mmol/L    Potassium 3.1 (L) 3.5 - 5.3 mmol/L    Chloride 99 98 - 107 mmol/L    Bicarbonate 26 21 - 32 mmol/L    Anion Gap 16 10 - 20 mmol/L    Urea Nitrogen 15 6 - 23 mg/dL    Creatinine 0.77 0.50 - 1.05 mg/dL    eGFR 88 >60 mL/min/1.73m*2    Calcium 10.0 8.6 - 10.3 mg/dL    Albumin 4.5 3.4 - 5.0 g/dL    Alkaline Phosphatase 98 33 - 136 U/L    Total Protein 8.0 6.4 - 8.2 g/dL    AST 16 9 - 39 U/L    Bilirubin, Total 0.6 0.0 - 1.2 mg/dL    ALT 15 7 - 45 U/L   Magnesium   Result Value Ref Range    Magnesium 1.43 (L) 1.60 - 2.40 mg/dL   Troponin I, High Sensitivity   Result Value Ref Range    Troponin I, High Sensitivity 5 0 - 13 ng/L   Sedimentation rate, automated   Result Value Ref Range    Sedimentation Rate 21 0 - 30 mm/h   ECG 12 lead   Result Value Ref Range    Ventricular Rate 69 BPM    Atrial Rate 69 BPM    AR Interval 140 ms    QRS Duration 84 ms    QT Interval 466 ms    QTC Calculation(Bazett) 499 ms    P Axis 45 degrees    R Axis 57 degrees    T Axis 56 degrees    QRS Count 12 beats    Q Onset 222 ms    P Onset 152 ms    P Offset 203 ms    T Offset 455 ms    QTC Fredericia 488 ms   POCT GLUCOSE   Result Value Ref Range    POCT Glucose 96 74 - 99 mg/dL   ECG 12 lead   Result Value Ref Range    Ventricular Rate 61 BPM    Atrial  Rate 61 BPM    VT Interval 148 ms    QRS Duration 82 ms    QT Interval 488 ms    QTC Calculation(Bazett) 491 ms    P Axis 42 degrees    R Axis 49 degrees    T Axis 62 degrees    QRS Count 10 beats    Q Onset 227 ms    P Onset 153 ms    P Offset 175 ms    T Offset 471 ms    QTC Fredericia 490 ms   Troponin I, High Sensitivity   Result Value Ref Range    Troponin I, High Sensitivity 6 0 - 13 ng/L   C-reactive protein   Result Value Ref Range    C-Reactive Protein 1.98 (H) <1.00 mg/dL   TSH with reflex to Free T4 if abnormal   Result Value Ref Range    Thyroid Stimulating Hormone 0.91 0.44 - 3.98 mIU/L   Sars-CoV-2 PCR   Result Value Ref Range    Coronavirus 2019, PCR Not Detected Not Detected   POCT GLUCOSE   Result Value Ref Range    POCT Glucose 104 (H) 74 - 99 mg/dL   CBC   Result Value Ref Range    WBC 8.8 4.4 - 11.3 x10*3/uL    nRBC 0.0 0.0 - 0.0 /100 WBCs    RBC 3.93 (L) 4.00 - 5.20 x10*6/uL    Hemoglobin 12.5 12.0 - 16.0 g/dL    Hematocrit 36.8 36.0 - 46.0 %    MCV 94 80 - 100 fL    MCH 31.8 26.0 - 34.0 pg    MCHC 34.0 32.0 - 36.0 g/dL    RDW 12.9 11.5 - 14.5 %    Platelets 320 150 - 450 x10*3/uL   Basic metabolic panel   Result Value Ref Range    Glucose 204 (H) 74 - 99 mg/dL    Sodium 138 136 - 145 mmol/L    Potassium 3.5 3.5 - 5.3 mmol/L    Chloride 105 98 - 107 mmol/L    Bicarbonate 25 21 - 32 mmol/L    Anion Gap 12 10 - 20 mmol/L    Urea Nitrogen 14 6 - 23 mg/dL    Creatinine 0.73 0.50 - 1.05 mg/dL    eGFR >90 >60 mL/min/1.73m*2    Calcium 9.2 8.6 - 10.3 mg/dL   Magnesium   Result Value Ref Range    Magnesium 1.66 1.60 - 2.40 mg/dL   POCT GLUCOSE   Result Value Ref Range    POCT Glucose 208 (H) 74 - 99 mg/dL        CT lumbar spine wo IV contrast   Final Result   No acute fracture or traumatic subluxation of the lumbar spine.        Additional findings are stable compared to prior exam from 04/07/2024   and detailed in the body of the report. If clinical concern persists,   consider MRI for better  evaluation of neural foraminal and central   canal stenosis.        MACRO:   None        Signed by: Milo Valadez 10/4/2024 10:19 PM   Dictation workstation:   [id number]      CT head wo IV contrast   Final Result   No evidence of acute cortical infarct or intracranial hemorrhage.        New paranasal sinus opacities on the left as detailed above        MACRO:   None        Signed by: Joseph Schoenberger 10/4/2024 1:10 PM   Dictation workstation:   [id number]          No echocardiogram results found for the past 14 days    DISCHARGE MEDICATIONS        Your medication list        START taking these medications        Instructions Last Dose Given Next Dose Due   magnesium oxide 400 mg tablet  Commonly known as: Mag-Ox      Take 1 tablet (400 mg) by mouth once daily.              CONTINUE taking these medications        Instructions Last Dose Given Next Dose Due   blood-glucose meter misc      1 kit 3 times a day.       nebulizers misc  Commonly known as:  Nebulizer-Unvrsl Tubing      1 Tube once daily as needed (Change nebulizer tubing as needed).              ASK your doctor about these medications        Instructions Last Dose Given Next Dose Due   albuterol 90 mcg/actuation inhaler      Inhale 1-2 puffs every 4-6 hours if needed for wheezing or shortness of breath       albuterol 2.5 mg /3 mL (0.083 %) nebulizer solution      Take 3 mL (2.5 mg) by nebulization every 4 hours if needed for wheezing or shortness of breath.       ALPRAZolam 0.5 mg tablet  Commonly known as: Xanax           aspirin 81 mg EC tablet           atorvastatin 80 mg tablet  Commonly known as: Lipitor      Take 1 tablet by mouth once daily.       buprenorphine-naloxone 8-2 mg SL tablet  Commonly known as: Suboxone           cholecalciferol 50 mcg (2,000 unit) capsule  Commonly known as: Vitamin D-3           cyanocobalamin 1,000 mcg tablet  Commonly known as: Vitamin B-12           dulaglutide 3 mg/0.5 mL pen injector      Inject 3 mg  under the skin 1 (one) time per week.       FISH OIL ORAL           gabapentin 800 mg tablet  Commonly known as: Neurontin      TAKE 1 TABLET BY MOUTH FOUR TIMES DAILY       hydroCHLOROthiazide 12.5 mg tablet  Commonly known as: Microzide      Take 1 tablet (12.5 mg) by mouth once daily.       lidocaine 5 % patch  Commonly known as: Lidoderm      Place 1 patch over 12 hours on the skin once daily. Remove & discard patch within 12 hours or as directed by MD.       losartan 100 mg tablet  Commonly known as: Cozaar      TAKE 1 TABLET BY MOUTH EVERY DAY       metFORMIN 1,000 mg tablet  Commonly known as: Glucophage      Take 1 tablet (1,000 mg) by mouth 2 times a day with meals.       OneTouch Ultra Test strip  Generic drug: blood sugar diagnostic      TEST 4 TIMES DAILY       QUEtiapine 25 mg tablet  Commonly known as: SEROquel           rOPINIRole 1 mg tablet  Commonly known as: Requip      TAKE 2 TABLETS BY MOUTH AT BEDTIME       Spiriva Respimat 2.5 mcg/actuation inhaler  Generic drug: tiotropium      inhale 2 (TWO) puffs into the lungs EVERY DAY AS DIRECTED       venlafaxine XR 75 mg 24 hr capsule  Commonly known as: Effexor-XR                     Where to Get Your Medications        These medications were sent to [email or web address] [address] [place] [address], [place] - [address] [address]      Phone: [phone]   magnesium oxide 400 mg tablet         OUTPATIENT FOLLOW-UP     Future Appointments   Date Time Provider Department Center   10/23/2024  3:00 PM Mynor Aaron MD [id number] [place]   10/28/2024  3:30 PM TOM[facility]S HUNT [place]   10/31/2024  3:00 PM Pineda Bauer DO [id number] [place]

## 2024-10-05 NOTE — PROGRESS NOTES
Physical Therapy    Physical Therapy Evaluation    Patient Name: Rachael Ruiz  MRN: [id number]  Today's Date: 10/5/2024   Time Calculation  Start Time: 0810  Stop Time: 0832  Time Calculation (min): 22 min  916/916-A    Assessment/Plan   PT Assessment  PT Assessment Results: Decreased strength, Decreased endurance, Impaired balance, Decreased mobility, Impaired judgement, Decreased safety awareness, Pain  Rehab Prognosis: Good  Barriers to Discharge: Decreased activity tolerance, stairs,  pain  Evaluation/Treatment Tolerance: Patient limited by fatigue, Patient limited by pain  Medical Staff Made Aware: Yes  Strengths: Support of Caregivers, Access to adaptive/assistive products  Barriers to Participation: Comorbidities  End of Session Communication: Bedside nurse  Assessment Comment: Pt. appears weak and deconditioned and her pain is limiting her functional mobility. Recommend continued therapy in acute care followed by therapy at a moderate intensity level following D/C.  End of Session Patient Position: Up in chair, Alarm on (Call light within reach)  IP OR SWING BED PT PLAN  Inpatient or Swing Bed: Inpatient  PT Plan  Treatment/Interventions: Bed mobility, Transfer training, Gait training, Balance training, Strengthening, Endurance training, Therapeutic exercise  PT Plan: Ongoing PT  PT Frequency: 3 times per week  PT Discharge Recommendations: Moderate intensity level of continued care  PT Recommended Transfer Status: Assist x1, Assistive device  Physical Therapy eval completed per MD requisition. P.T. recommendations as outlined above. Recommend D/C from acute care when medically appropriate as deemed by medical staff.    Subjective           General Visit Information:  General  Reason for Referral: impaired mobility  Referred By: Dr. Boyer (PT/OT 10/4)  Past Medical History Relevant to Rehab: includes: COPD, neuropathy, OA, depression, anxiety, anemia, DM, CTS, Ramona, RA, knee surgery, bipolar disorder,  lumbar lami wit PSIF, spinal stenosis  Family/Caregiver Present: No  Co-Treatment: OT  Co-Treatment Reason: Pt. seen with OT to maximize safety and function  Prior to Session Communication: Bedside nurse  Patient Position Received: Bed, 3 rail up, Alarm off, not on at start of session  Preferred Learning Style: auditory, verbal  General Comment: Pt. is a 60yo who presented to [facility] ED on 10/4/2024 with c/o weakness x 4 days (stated she stopped taking her B12). Pt. was scheduled to be D/C home from ED but became diaphoretic and stated she did not feel well. In ED, /92, K = 3.1, Mg = 1.43, Troponin (-) x 2, EKG (-)   L-spine CT (10/4) (-) fracture, (+) known spinal surgery    Head CT (10/4) (-) acute findings    Dx: hypokalemia, hypomagnesia, sinusitis    Home Living:  Home Living  Home Living Comments: Pt. lives with S/O in a 2 level house with 4 JOYCE with HR. Bed/bath on 2nd floor with tub shower with a seat and grab bars. Pt. has a stair lift to 2nd floor. Laundry in basement. Pt. plans on getting stair lift to basement at some point.    Prior Level of Function:  Prior Function Per Pt/Caregiver Report  Prior Function Comments: Pt. stated she mostly stays on the 2nd floor. Pt. has 2 FWW (1 on 1st floor and 1 on 2nd floor). Pt. owns an electric W/C and a transport W/C. Pt. stated she amb with FWW PTA. Pt. stated she had A with LB dressing and washing her back from S/O PTA. S/O completed IADLS PTA.  Pt. reported < 5 falls in last 3 months. Pt. drove PTA.    Precautions:  Precautions  Medical Precautions:  (Activity order: No resrictions)  Precautions Comment: Per EMR: High fall risk       Objective     Pain:  Pain Assessment  Pain Assessment: 0-10  0-10 (Numeric) Pain Score: 10 - Worst possible pain  Pain Type: Chronic pain  Pain Location: Back  Pain Orientation: Lower  Pain Interventions: Repositioned    Cognition:  Cognition  Overall Cognitive Status: Within Functional Limits  Orientation Level: Disoriented to  situation  Following Commands: Follows one step commands with increased time  Safety Judgment: Decreased awareness of need for safety precautions  Problem Solving: Assistance required to generate solutions  Attention: Exceptions to WFL  Alternating Attention: Impaired  Sustained Attention: Impaired  Other (Comment): Pt. very anxious and difficult to remain on task  Safety/Judgement: Exceptions to WFL  Complex Functional Tasks: Moderate  Insight: Moderate  Impulsive: Mildly  Flexibility of Thought: Reduced flexibility  Planning: Reduced planning skills  Organization: Moderately disorganized  Processing Speed: Delayed    General Assessments:  General Observation  General Observation: IV, tele   Activity Tolerance  Endurance: Decreased tolerance for upright activites                 Dynamic Sitting Balance  Dynamic Sitting-Comments: Good static and dynamic sitting balance  Dynamic Standing Balance  Dynamic Standing-Comments: Fair+ static and dynamic standing balance    Functional Assessments:     Bed Mobility  Bed Mobility: Yes  Bed Mobility 1  Bed Mobility 1: Supine to sitting  Level of Assistance 1: Minimum assistance  Bed Mobility Comments 1: A to maneuver LEs over EOB and to elevate trunk from bed  Transfers  Transfer: Yes  Transfer 1  Technique 1: Sit to stand  Transfer Device 1:  (FWW)  Transfer Level of Assistance 1: Minimum assistance  Trials/Comments 1: A for lifting, transferring weight over feet, steadying. VC's for hand placement  Transfers 2  Technique 2: Stand to sit  Transfer Device 2:  (FWW)  Transfer Level of Assistance 2: Contact guard  Trials/Comments 2: CGA for safety. Vc's for hand placement  Transfers 3  Technique 3: Stand pivot  Transfer Device 3:  (FWW)  Transfer Level of Assistance 3: Minimum assistance  Trials/Comments 3: A for balance, safety. VC's for walker placement, to remain on task  Ambulation/Gait Training  Ambulation/Gait Training Performed: Yes  Ambulation/Gait Training 1  Surface 1:  Level tile  Device 1: Rolling walker  Assistance 1: Minimum assistance  Quality of Gait 1: Narrow base of support (slow, unsteady gait with shortened step lengths)  Comments/Distance (ft) 1: 10'; A for balance, safety. VC's for safety, walker placement, to remain on task  Stairs  Stairs: No       Extremity/Trunk Assessments:  RUE   RUE :  (AROM WFL)  LUE   LUE:  (AROM WFL)  RLE   RLE :  (AROM WFL, strength 4-/5)  LLE   LLE :  (AROM WFL, strength 4-/5)    Outcome Measures:     [facility] Basic Mobility  Turning from your back to your side while in a flat bed without using bedrails: A little  Moving from lying on your back to sitting on the side of a flat bed without using bedrails: A little  Moving to and from bed to chair (including a wheelchair): A little  Standing up from a chair using your arms (e.g. wheelchair or bedside chair): A little  To walk in hospital room: A little  Climbing 3-5 steps with railing: Total  Basic Mobility - Total Score: 16                                                             Goals:  Encounter Problems       Encounter Problems (Active)       PT Problem       Pt. will transfer supine/sit with CGA (Progressing)       Start:  10/05/24    Expected End:  10/19/24            Pt. will transfer sit/stand with FWW with CGA (Progressing)       Start:  10/05/24    Expected End:  10/19/24            Pt.will ambulate 50' with FWW with CGA (Progressing)       Start:  10/05/24    Expected End:  10/19/24            Pt. will amb up/down 2 steps with HR and MIN A x 1 (Not Progressing)       Start:  10/05/24    Expected End:  10/19/24            Pt. will perform 2 x 15 B LE AROM exercises  (Not Progressing)       Start:  10/05/24    Expected End:  10/19/24                         Education Documentation  Mobility Training, taught by Yunior Ashley, PT at 10/5/2024 10:29 AM.  Learner: Patient  Readiness: Acceptance  Method: Explanation  Response: Verbalizes Understanding, Needs Reinforcement  Comment: Role  of PT, transfers, amb, safety, PT POC

## 2024-10-05 NOTE — HH CARE COORDINATION
Home Care received a Referral for Physical Therapy and Occupational Therapy. We have processed the referral for a Start of Care on 10/7/24.     If you have any questions or concerns, please feel free to contact us at [phone]. Follow the prompts, enter your five digit zip code, and you will be directed to your care team on [facility] 1.

## 2024-10-05 NOTE — CARE PLAN
Problem: Skin  Goal: Decreased wound size/increased tissue granulation at next dressing change  Outcome: Progressing  Goal: Participates in plan/prevention/treatment measures  Outcome: Progressing  Goal: Prevent/manage excess moisture  Outcome: Progressing  Goal: Prevent/minimize sheer/friction injuries  Outcome: Progressing  Goal: Promote/optimize nutrition  Outcome: Progressing  Goal: Promote skin healing  Outcome: Progressing     Problem: Fall/Injury  Goal: Not fall by end of shift  Outcome: Progressing  Goal: Be free from injury by end of the shift  Outcome: Progressing  Goal: Verbalize understanding of personal risk factors for fall in the hospital  Outcome: Progressing  Goal: Verbalize understanding of risk factor reduction measures to prevent injury from fall in the home  Outcome: Progressing  Goal: Use assistive devices by end of the shift  Outcome: Progressing  Goal: Pace activities to prevent fatigue by end of the shift  Outcome: Progressing     Problem: Pain  Goal: Takes deep breaths with improved pain control throughout the shift  Outcome: Progressing  Goal: Turns in bed with improved pain control throughout the shift  Outcome: Progressing  Goal: Walks with improved pain control throughout the shift  Outcome: Progressing  Goal: Performs ADL's with improved pain control throughout shift  Outcome: Progressing  Goal: Participates in PT with improved pain control throughout the shift  Outcome: Progressing  Goal: Free from opioid side effects throughout the shift  Outcome: Progressing  Goal: Free from acute confusion related to pain meds throughout the shift  Outcome: Progressing

## 2024-10-07 ENCOUNTER — PATIENT OUTREACH (OUTPATIENT)
Dept: PRIMARY CARE | Facility: CLINIC | Age: 59
End: 2024-10-07
Payer: MEDICARE

## 2024-10-07 NOTE — PROGRESS NOTES
Discharge Facility: [facility]  Discharge Diagnosis: Weakness, hypomagnesemia, hypokalemia  Admission Date: 10/4/2024  Discharge Date: 10/5/2024    PCP Appointment Date:  -Unable to schedule d/t no available appts; task sent to office to assist with scheduling follow up  -Pt next appt 10/31/2024 1500    Specialist Appointment Date:   -10/23/2024 1500 neurology  -10/28/2024 1530 electromyography    Hospital Encounter and Summary Linked: Yes    See discharge assessment below for further details    Engagement  Call Start Time: 0941 (10/7/2024  9:41 AM)    Medications  Medications reviewed with patient/caregiver?: Yes (new prescriptions reviewed; augmentin, flonase, magnesium oxide, potassium chloride cr) (10/7/2024  9:41 AM)  Is the patient having any side effects they believe may be caused by any medication additions or changes?: No (10/7/2024  9:41 AM)  Does the patient have all medications ordered at discharge?: Yes (10/7/2024  9:41 AM)  Care Management Interventions: No intervention needed (10/7/2024  9:41 AM)  Is the patient taking all medications as directed (includes completed medication regime)?: Yes (10/7/2024  9:41 AM)    Appointments  Does the patient have a primary care provider?: Yes (10/7/2024  9:41 AM)  Care Management Interventions: Verified appointment date/time/provider (10/7/2024  9:41 AM)  Has the patient kept scheduled appointments due by today?: Yes (10/7/2024  9:41 AM)    Self Management  What is the home health agency?: [facility] [phone] (10/7/2024  9:41 AM)  Has home health visited the patient within 72 hours of discharge?: Call prior to 72 hours (10/7/2024  9:41 AM)    Patient Teaching  Does the patient have access to their discharge instructions?: Yes (10/7/2024  9:41 AM)  Care Management Interventions: Reviewed instructions with patient (10/7/2024  9:41 AM)  What is the patient's perception of their health status since discharge?: Improving (10/7/2024  9:41 AM)  Is the  patient/caregiver able to teach back the hierarchy of who to call/visit for symptoms/problems? PCP, Specialist, Home Health nurse, Urgent Care, ED, 911: Yes (10/7/2024  9:41 AM)    Wrap Up  Wrap Up Additional Comments: Pt was admitted to [facility] 10/4-10/5/2024 for weakness. Pt reports feeling better since discharge. Pt discharged with prescriptions reviewed; augmentin, flonase, magnesium oxide, potassium chloride; pt denies questions or issues with medication. Pt reports understanding of discharge instructions. Pt has referral for [facility]. Pt has follow up appt with neurologist 10/23/2024 1500. Verified pt next PCP appt 10/31/2024 1500; task sent to office to attempt to reschedule pt sooner d/t no available appt for this RN to schedule. Pt denies any other questions, needs, or concerns at this time. Pt encouraged to call if questions or needs arise. (10/7/2024  9:41 AM)  Call End Time: 0952 (10/7/2024  9:41 AM)

## 2024-10-08 DIAGNOSIS — J44.9 CHRONIC OBSTRUCTIVE PULMONARY DISEASE, UNSPECIFIED COPD TYPE (MULTI): ICD-10-CM

## 2024-10-08 RX ORDER — ALBUTEROL SULFATE 0.83 MG/ML
SOLUTION RESPIRATORY (INHALATION)
Qty: 90 ML | Refills: 2 | Status: SHIPPED | OUTPATIENT
Start: 2024-10-08

## 2024-10-10 ENCOUNTER — HOME CARE VISIT (OUTPATIENT)
Dept: HOME HEALTH SERVICES | Facility: HOME HEALTH | Age: 59
End: 2024-10-10

## 2024-10-10 DIAGNOSIS — E87.6 HYPOKALEMIA: ICD-10-CM

## 2024-10-10 RX ORDER — POTASSIUM CHLORIDE 20 MEQ/1
20 TABLET, EXTENDED RELEASE ORAL DAILY
Qty: 30 TABLET | Refills: 0 | Status: SHIPPED | OUTPATIENT
Start: 2024-10-10

## 2024-10-10 NOTE — TELEPHONE ENCOUNTER
Pt was seen in ED. Rx potassium chloride 20 meq. Only given 5 day supply, requesting refill. Please advise.

## 2024-10-14 ENCOUNTER — TELEPHONE (OUTPATIENT)
Dept: PRIMARY CARE | Facility: CLINIC | Age: 59
End: 2024-10-14
Payer: MEDICARE

## 2024-10-14 DIAGNOSIS — Z12.11 SCREENING FOR COLON CANCER: ICD-10-CM

## 2024-10-14 DIAGNOSIS — E11.42 TYPE 2 DIABETES MELLITUS WITH DIABETIC POLYNEUROPATHY, WITHOUT LONG-TERM CURRENT USE OF INSULIN: Primary | ICD-10-CM

## 2024-10-14 DIAGNOSIS — J44.9 CHRONIC OBSTRUCTIVE PULMONARY DISEASE, UNSPECIFIED COPD TYPE (MULTI): ICD-10-CM

## 2024-10-14 DIAGNOSIS — R19.5 POSITIVE COLORECTAL CANCER SCREENING USING COLOGUARD TEST: Primary | ICD-10-CM

## 2024-10-14 LAB — NONINV COLON CA DNA+OCC BLD SCRN STL QL: POSITIVE

## 2024-10-14 NOTE — TELEPHONE ENCOUNTER
Her Cologuard screen is positive.  We discussed the significance of a positive screen and the importance of following through with a colonoscopy.  We will order a colonoscopy.  Recommended that she contact us if she does not hear about getting this set up in the next 1 to 2 weeks

## 2024-10-15 DIAGNOSIS — Z12.11 COLON CANCER SCREENING: Primary | ICD-10-CM

## 2024-10-16 ENCOUNTER — TELEPHONE (OUTPATIENT)
Dept: PRIMARY CARE | Facility: CLINIC | Age: 59
End: 2024-10-16

## 2024-10-16 ENCOUNTER — APPOINTMENT (OUTPATIENT)
Dept: PRIMARY CARE | Facility: CLINIC | Age: 59
End: 2024-10-16
Payer: MEDICARE

## 2024-10-16 VITALS — SYSTOLIC BLOOD PRESSURE: 136 MMHG | DIASTOLIC BLOOD PRESSURE: 85 MMHG

## 2024-10-16 DIAGNOSIS — E87.6 HYPOKALEMIA: ICD-10-CM

## 2024-10-16 DIAGNOSIS — N76.0 ACUTE VAGINITIS: ICD-10-CM

## 2024-10-16 DIAGNOSIS — Z13.5 DIABETIC RETINOPATHY SCREENING: ICD-10-CM

## 2024-10-16 DIAGNOSIS — M47.816 LUMBAR SPONDYLOSIS: Primary | ICD-10-CM

## 2024-10-16 DIAGNOSIS — J32.9 SINUSITIS, UNSPECIFIED CHRONICITY, UNSPECIFIED LOCATION: Primary | ICD-10-CM

## 2024-10-16 DIAGNOSIS — E83.42 HYPOMAGNESEMIA: ICD-10-CM

## 2024-10-16 PROCEDURE — 3079F DIAST BP 80-89 MM HG: CPT | Performed by: FAMILY MEDICINE

## 2024-10-16 PROCEDURE — 4004F PT TOBACCO SCREEN RCVD TLK: CPT | Performed by: FAMILY MEDICINE

## 2024-10-16 PROCEDURE — 4010F ACE/ARB THERAPY RXD/TAKEN: CPT | Performed by: FAMILY MEDICINE

## 2024-10-16 PROCEDURE — 99495 TRANSJ CARE MGMT MOD F2F 14D: CPT | Performed by: FAMILY MEDICINE

## 2024-10-16 PROCEDURE — 3075F SYST BP GE 130 - 139MM HG: CPT | Performed by: FAMILY MEDICINE

## 2024-10-16 RX ORDER — DULOXETIN HYDROCHLORIDE 20 MG/1
CAPSULE, DELAYED RELEASE ORAL
COMMUNITY
Start: 2024-09-02

## 2024-10-16 RX ORDER — SODIUM, POTASSIUM,MAG SULFATES 17.5-3.13G
SOLUTION, RECONSTITUTED, ORAL ORAL
Qty: 354 ML | Refills: 0 | Status: SHIPPED | OUTPATIENT
Start: 2024-10-16

## 2024-10-16 RX ORDER — FLUCONAZOLE 150 MG/1
150 TABLET ORAL ONCE
Qty: 2 TABLET | Refills: 0 | Status: SHIPPED | OUTPATIENT
Start: 2024-10-16 | End: 2024-10-16

## 2024-10-16 ASSESSMENT — ENCOUNTER SYMPTOMS
FEVER: 0
SINUS PRESSURE: 0
COUGH: 1
HEADACHES: 0
FATIGUE: 1
RHINORRHEA: 1

## 2024-10-16 NOTE — PROGRESS NOTES
Subjective   Patient ID: Rachael Ruiz is a 59 y.o. female who presents for Follow-up.       Virtual or Telephone Consent    A telephone visit (audio only) between the patient (at the originating site) and the provider (at the distant site) was utilized to provide this telehealth service.   Verbal consent was requested and obtained from Rachael Ruiz on this date, 10/16/24 for a telehealth visit.      She is here today for follow-up on hospitalization 10/4 through 10/5/2024 for acute maxillary sinusitis, hypokalemia and hypomagnesemia  Past medical history is significant for type 2 diabetes mellitus (last A1c 6.0%), diabetic neuropathy, hypertension, hyperlipidemia, COPD, and bipolar disorder  Prior to hospitalization 3 she had a 3-day history of appetite loss, weakness, fatigue and sweating.  No fever.  No sinus pressure but she did have a runny and stuffy nose  She had labs which showed decreased potassium at 3.1 and decreased magnesium at 1.43 on admission.  These were treated during hospitalization and improved to normal range on discharge the following day  She had a CT scan of her head and cervical spine.  There were no acute findings seen, but there were findings consistent of left maxillary and middle ethmoid sinusitis.  She was treated with IV fluids and IV Unasyn and discharged home with p.o. Augmentin  She is feeling much better since discharge from the hospital.  She is no longer having any fatigue or weakness.  She does not have any sinus pressure.  She does feel that she has a yeast infection from the antibiotics.  Has had a 2-day history of itching.  Her home glucose readings have been averaging 127.  Home blood pressure is 136/85        Review of Systems   Constitutional:  Positive for fatigue. Negative for fever.   HENT:  Positive for congestion and rhinorrhea. Negative for sinus pressure.    Respiratory:  Positive for cough.    Neurological:  Negative for headaches.       Objective   There  were no vitals taken for this visit.    Physical Exam exam not done due to today's visit being done over the telephone    Assessment/Plan   Assessment & Plan  Sinusitis, unspecified chronicity, unspecified location         Hypomagnesemia         Hypokalemia         With hospitalization 10/4 through 10/5/2024 for sinusitis, weakness, hypokalemia, and hypomagnesemia.  I reviewed records from hospitalization and discharge summary.  Hypokalemia and hypomagnesemia were treated during her hospitalization and her potassium and magnesium levels improved to normal range on discharge.  She was treated with antibiotics for sinusitis seen on CT scan and has completed a full course of Augmentin and is feeling much better and is no longer having any significant fatigue or weakness.  She has developed a possible yeast infection due to recent antibiotics.  Will treat this with p.o. fluconazole.  Call or follow-up if any new or worsening symptoms.  She has a follow-up scheduled with me in approximately 2 weeks and we will discuss rechecking potassium and magnesium levels at that time  At the end of the visit, she did ask me for a new ophthalmology referral for routine eye exams.  I placed a referral in the system

## 2024-10-18 ENCOUNTER — TELEPHONE (OUTPATIENT)
Dept: PRIMARY CARE | Facility: CLINIC | Age: 59
End: 2024-10-18
Payer: MEDICARE

## 2024-10-18 ENCOUNTER — PATIENT OUTREACH (OUTPATIENT)
Dept: PRIMARY CARE | Facility: CLINIC | Age: 59
End: 2024-10-18
Payer: MEDICARE

## 2024-10-18 NOTE — PROGRESS NOTES
Called and notified pt per Dr. Bauer if symptoms have resolved okay to not take diflucan. Pt encouraged to call if urinary frequency does not improve so Dr. Bauer can order a urine sample.  Pt encouraged to call if any other questions or needs arise.

## 2024-10-18 NOTE — PROGRESS NOTES
Call regarding appt. with PCP on 10/16/2024 after hospitalization.  At time of outreach call the patient feels as if their condition has improved since last visit.  Reviewed the PCP appointment with the pt and addressed any questions or concerns.    Pt reports she had cologuard test that was positive. She states she has already scheduled colonoscopy.    Pt reports she has not yet picked up diflucan and states she does not think she has a yeast infection. Pt states she will pick it up but does not want to take it if she doesn't need it.  Pt reports urinary frequency but denies dysuria.  Update sent to PCP.    Pt states she has had changes in her vision and will be scheduling appt with eye doctor. Confirmed PCP placed referral.    Verified upcoming appts;  -10/23/2024 1245 lung screening  -10/23/2024 1500 neurology  -10/28/2024 1530 electromyography  -10/31/2024 1500 PCP  -11/5/2024 1300 ortho  -11/7/2024 1230 colonoscopy    Pt denies any questions, needs, or concerns at this time. Pt encouraged to call if questions or needs arise.
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
## 2024-10-21 ENCOUNTER — TELEPHONE (OUTPATIENT)
Dept: HOME HEALTH SERVICES | Facility: HOME HEALTH | Age: 59
End: 2024-10-21
Payer: MEDICARE

## 2024-10-21 NOTE — TELEPHONE ENCOUNTER
Pineda Bauer DO AND Dr. Mitul Silvestre MD     Home Care received a referral for Rachael for   PT( physical therapy)  . Unfortunately, we are unable to process the referral at this time.Due to patient indicated that she wanted to wait until November to start home care .     Please send  Home Care new orders once patient is ready to be evaluated  for home PT.       Providers, please reach out to  Home Care with any questions regarding the declined referral.      Thank you     SHANICE RYAN LPN      Referral

## 2024-10-22 ENCOUNTER — TELEPHONE (OUTPATIENT)
Dept: PRIMARY CARE | Facility: CLINIC | Age: 59
End: 2024-10-22
Payer: MEDICARE

## 2024-10-22 ENCOUNTER — TELEMEDICINE (OUTPATIENT)
Dept: PRIMARY CARE | Facility: CLINIC | Age: 59
End: 2024-10-22
Payer: MEDICARE

## 2024-10-22 DIAGNOSIS — R35.0 URINARY FREQUENCY: Primary | ICD-10-CM

## 2024-10-22 PROCEDURE — 4010F ACE/ARB THERAPY RXD/TAKEN: CPT | Performed by: FAMILY MEDICINE

## 2024-10-22 PROCEDURE — 4004F PT TOBACCO SCREEN RCVD TLK: CPT | Performed by: FAMILY MEDICINE

## 2024-10-22 PROCEDURE — 99442 PR PHYS/QHP TELEPHONE EVALUATION 11-20 MIN: CPT | Performed by: FAMILY MEDICINE

## 2024-10-22 ASSESSMENT — ENCOUNTER SYMPTOMS
FREQUENCY: 1
DYSURIA: 0
FEVER: 0
COUGH: 0

## 2024-10-22 NOTE — PROGRESS NOTES
Subjective   Patient ID: Rachael Ruiz is a 59 y.o. female who presents for UTI.    Virtual or Telephone Consent    An interactive audio and video telecommunication system which permits real time communications between the patient (at the originating site) and provider (at the distant site) was utilized to provide this telehealth service.   Verbal consent was requested and obtained from Rachael Ruiz on this date, 10/22/24 for a telehealth visit.     UTI   This is a new problem. Episode onset: 4-5 days. Associated symptoms include frequency and urgency. Associated symptoms comments: Itching.   Today's visit was done over the telephone.  She is concerned about a possible UTI.  She has had a 5-day history of symptoms including urinary frequency and urgency.  No dysuria.  No flank pain, nausea/vomiting, abdominal pain, or fever  She is a diabetic.  Average glucose has been 142 since symptoms started      Review of Systems   Constitutional:  Negative for fever.   Respiratory:  Negative for cough.    Genitourinary:  Positive for frequency and urgency. Negative for dysuria.       Objective   There were no vitals taken for this visit.    Physical Exam not done due to today's visit being done over the telephone    Assessment/Plan   Assessment & Plan  Urinary frequency    Orders:    Urine Culture; Future    Urinalysis with Reflex Microscopic; Future    She has had a 5-day history of symptoms including urinary urgency and frequency, without any other symptoms including dysuria, fever, abdominal pain, flank pain, or vomiting.  We will order a urinalysis and urine culture and plan on following up by phone once I have results.  Plan on treating her if UA indicates UTI.  Otherwise if urinalysis and culture are negative, and symptoms persist, recommended scheduling a follow-up in the next 1 to 2 weeks to discuss further  Total time spent today was approximately 12 minutes

## 2024-10-23 ENCOUNTER — APPOINTMENT (OUTPATIENT)
Dept: NEUROLOGY | Facility: CLINIC | Age: 59
End: 2024-10-23
Payer: MEDICARE

## 2024-10-23 ENCOUNTER — APPOINTMENT (OUTPATIENT)
Dept: RADIOLOGY | Facility: CLINIC | Age: 59
End: 2024-10-23
Payer: MEDICARE

## 2024-10-25 ENCOUNTER — TELEPHONE (OUTPATIENT)
Dept: PRIMARY CARE | Facility: CLINIC | Age: 59
End: 2024-10-25
Payer: MEDICARE

## 2024-10-28 ENCOUNTER — APPOINTMENT (OUTPATIENT)
Dept: NEUROLOGY | Facility: HOSPITAL | Age: 59
End: 2024-10-28
Payer: COMMERCIAL

## 2024-10-29 ENCOUNTER — TELEPHONE (OUTPATIENT)
Dept: PRIMARY CARE | Facility: CLINIC | Age: 59
End: 2024-10-29
Payer: MEDICARE

## 2024-10-29 ENCOUNTER — ANESTHESIA EVENT (OUTPATIENT)
Dept: GASTROENTEROLOGY | Facility: EXTERNAL LOCATION | Age: 59
End: 2024-10-29

## 2024-10-29 DIAGNOSIS — E11.42 TYPE 2 DIABETES MELLITUS WITH DIABETIC POLYNEUROPATHY, WITHOUT LONG-TERM CURRENT USE OF INSULIN: ICD-10-CM

## 2024-10-29 DIAGNOSIS — N39.0 URINARY TRACT INFECTION WITHOUT HEMATURIA, SITE UNSPECIFIED: Primary | ICD-10-CM

## 2024-10-29 DIAGNOSIS — G25.81 RESTLESS LEGS SYNDROME: ICD-10-CM

## 2024-10-29 RX ORDER — GABAPENTIN 800 MG/1
800 TABLET ORAL 4 TIMES DAILY
Qty: 120 TABLET | Refills: 3 | Status: SHIPPED | OUTPATIENT
Start: 2024-10-29

## 2024-10-29 RX ORDER — ROPINIROLE 1 MG/1
2 TABLET, FILM COATED ORAL NIGHTLY
Qty: 60 TABLET | Refills: 3 | Status: SHIPPED | OUTPATIENT
Start: 2024-10-29

## 2024-10-30 ENCOUNTER — TELEPHONE (OUTPATIENT)
Dept: PRIMARY CARE | Facility: CLINIC | Age: 59
End: 2024-10-30
Payer: MEDICARE

## 2024-10-30 RX ORDER — NITROFURANTOIN 25; 75 MG/1; MG/1
100 CAPSULE ORAL 2 TIMES DAILY
Qty: 14 CAPSULE | Refills: 0 | Status: SHIPPED | OUTPATIENT
Start: 2024-10-30 | End: 2024-11-06

## 2024-10-31 ENCOUNTER — TELEPHONE (OUTPATIENT)
Dept: PRIMARY CARE | Facility: CLINIC | Age: 59
End: 2024-10-31

## 2024-10-31 ENCOUNTER — APPOINTMENT (OUTPATIENT)
Dept: PRIMARY CARE | Facility: CLINIC | Age: 59
End: 2024-10-31
Payer: MEDICARE

## 2024-10-31 VITALS
OXYGEN SATURATION: 98 % | RESPIRATION RATE: 16 BRPM | SYSTOLIC BLOOD PRESSURE: 126 MMHG | DIASTOLIC BLOOD PRESSURE: 68 MMHG | HEART RATE: 76 BPM | TEMPERATURE: 97.8 F

## 2024-10-31 DIAGNOSIS — Z13.5 SCREENING FOR DIABETIC RETINOPATHY: ICD-10-CM

## 2024-10-31 DIAGNOSIS — E11.42 TYPE 2 DIABETES MELLITUS WITH DIABETIC POLYNEUROPATHY, WITHOUT LONG-TERM CURRENT USE OF INSULIN: Primary | ICD-10-CM

## 2024-10-31 DIAGNOSIS — E78.49 OTHER HYPERLIPIDEMIA: ICD-10-CM

## 2024-10-31 DIAGNOSIS — E53.8 B12 DEFICIENCY: ICD-10-CM

## 2024-10-31 DIAGNOSIS — I10 ESSENTIAL HYPERTENSION, BENIGN: ICD-10-CM

## 2024-10-31 LAB — POC HEMOGLOBIN A1C: 6 % (ref 4.2–6.5)

## 2024-10-31 PROCEDURE — 3074F SYST BP LT 130 MM HG: CPT | Performed by: FAMILY MEDICINE

## 2024-10-31 PROCEDURE — G2211 COMPLEX E/M VISIT ADD ON: HCPCS | Performed by: FAMILY MEDICINE

## 2024-10-31 PROCEDURE — 83036 HEMOGLOBIN GLYCOSYLATED A1C: CPT | Performed by: FAMILY MEDICINE

## 2024-10-31 PROCEDURE — 4004F PT TOBACCO SCREEN RCVD TLK: CPT | Performed by: FAMILY MEDICINE

## 2024-10-31 PROCEDURE — 3078F DIAST BP <80 MM HG: CPT | Performed by: FAMILY MEDICINE

## 2024-10-31 PROCEDURE — 4010F ACE/ARB THERAPY RXD/TAKEN: CPT | Performed by: FAMILY MEDICINE

## 2024-10-31 PROCEDURE — 96372 THER/PROPH/DIAG INJ SC/IM: CPT | Performed by: FAMILY MEDICINE

## 2024-10-31 PROCEDURE — 99214 OFFICE O/P EST MOD 30 MIN: CPT | Performed by: FAMILY MEDICINE

## 2024-10-31 RX ORDER — CALCIUM CARBONATE 300MG(750)
400 TABLET,CHEWABLE ORAL DAILY
Qty: 30 TABLET | Refills: 0 | Status: SHIPPED | OUTPATIENT
Start: 2024-10-31 | End: 2024-11-30

## 2024-10-31 RX ORDER — CYANOCOBALAMIN 1000 UG/ML
1000 INJECTION, SOLUTION INTRAMUSCULAR; SUBCUTANEOUS ONCE
Status: COMPLETED | OUTPATIENT
Start: 2024-10-31 | End: 2024-10-31

## 2024-10-31 ASSESSMENT — ENCOUNTER SYMPTOMS
ABDOMINAL PAIN: 0
COUGH: 0
SEIZURES: 0
VISUAL CHANGE: 1
POLYPHAGIA: 0
HUNGER: 0
BLURRED VISION: 0
NERVOUS/ANXIOUS: 1
SWEATS: 1
FATIGUE: 0
SPEECH DIFFICULTY: 0
WEIGHT LOSS: 0
DIZZINESS: 0
NUMBNESS: 1
WEAKNESS: 0
TREMORS: 0
HEADACHES: 0
BLACKOUTS: 0
POLYDIPSIA: 1
CONFUSION: 0
BACK PAIN: 1

## 2024-11-01 ENCOUNTER — APPOINTMENT (OUTPATIENT)
Dept: PHARMACY | Facility: HOSPITAL | Age: 59
End: 2024-11-01
Payer: MEDICARE

## 2024-11-05 ENCOUNTER — HOSPITAL ENCOUNTER (OUTPATIENT)
Dept: RADIOLOGY | Facility: CLINIC | Age: 59
Discharge: HOME | End: 2024-11-05
Payer: MEDICARE

## 2024-11-05 ENCOUNTER — OFFICE VISIT (OUTPATIENT)
Dept: ORTHOPEDIC SURGERY | Facility: CLINIC | Age: 59
End: 2024-11-05
Payer: MEDICARE

## 2024-11-05 DIAGNOSIS — M54.50 LUMBAR PAIN: ICD-10-CM

## 2024-11-05 DIAGNOSIS — M54.16 LUMBAR RADICULOPATHY: ICD-10-CM

## 2024-11-05 DIAGNOSIS — E87.6 HYPOKALEMIA: ICD-10-CM

## 2024-11-05 DIAGNOSIS — M54.50 LUMBAR PAIN: Primary | ICD-10-CM

## 2024-11-05 PROCEDURE — 99214 OFFICE O/P EST MOD 30 MIN: CPT | Performed by: ORTHOPAEDIC SURGERY

## 2024-11-05 PROCEDURE — 99215 OFFICE O/P EST HI 40 MIN: CPT | Performed by: ORTHOPAEDIC SURGERY

## 2024-11-05 PROCEDURE — 4010F ACE/ARB THERAPY RXD/TAKEN: CPT | Performed by: ORTHOPAEDIC SURGERY

## 2024-11-05 PROCEDURE — 72110 X-RAY EXAM L-2 SPINE 4/>VWS: CPT | Performed by: ORTHOPAEDIC SURGERY

## 2024-11-05 PROCEDURE — 72120 X-RAY BEND ONLY L-S SPINE: CPT

## 2024-11-05 PROCEDURE — L4361 PNEUMA/VAC WALK BOOT PRE OTS: HCPCS | Performed by: ORTHOPAEDIC SURGERY

## 2024-11-05 RX ORDER — POTASSIUM CHLORIDE 20 MEQ/1
20 TABLET, EXTENDED RELEASE ORAL DAILY
Qty: 30 TABLET | Refills: 2 | Status: SHIPPED | OUTPATIENT
Start: 2024-11-05

## 2024-11-05 NOTE — PROGRESS NOTES
Rachael Ruiz is a 59 y.o. female who presents for New Patient Visit of the Lower Back (Low back pain with swelling, instability, has had at least 9 falls in the past year/Xray today).    HPI:  59-year-old female here for new patient visit of low back pain and frequent falls.  She denies any fever chills nausea vomiting night sweats.  She has no bowel or bladder complaints.    Physical exam:  Well-nourished, well kept.No lymphangitis or lymphadenopathy in the examined extremities.  Patient is in a wheelchair today, limited mobility, limited physical exam.  Patient can rise from a seated position and sit from a standing position with some difficulty.  Examination of the back shows tenderness in the paraspinous musculature, mostly in the lumbosacral area.  She is also tender over her greater trochanteric bursa's bilaterally.  There is decreased range of motion in all directions due to guarding/muscle spasms and pain at extremes.  There is good strength and no instability.  Examination of the lower extremities reveals no point tenderness, swelling, or deformity.  Range of motion of the hips, knees, and ankles are full without crepitance, instability, or exacerbation of pain, except internal/external rotation of her left leg and hip reproduces most of her groin and leg pain on the left side..  Strength is 5/5 throughout, except there is some diffuse weakness throughout most likely deconditioning.  No redness, abrasions, or lesions on extremities  Gross sensation intact in the extremities.  Affect normal.  Alert and oriented ×3.  Coordination normal.    Imaging studies:  We ordered and reviewed AP lateral flexion-extension x-rays of the lumbar spine today.  A CT of the lumbar spine from October 4 was also reviewed.    Assessment:  59-year-old female here for evaluation of chronic low back pain.  This is a patient known to Dr. Coyle.  She had an L3-S1 laminectomy with an L5-S1 TLIF in April 2019.  She has been  having back issues chronically.  She says its 85% back and 15% left leg.  She has tenderness over both of her greater trochanteric bursa, and she is describing groin pain and hip pain on the left and right, left leg is worse.  I can reproduce most of that groin and hip pain with internal extra rotation of her legs bilaterally, again left is worse.  She has some moderate degenerative changes on her partially viewed hips on AP view.  She has not done anything within at least a year such as physical therapy or chiropractic care.  She has not had any recent injections.   she has known diabetic neuropathy and she has numbness in her calves shins and ankles and feet bilaterally.    We have ordered and reviewed tests, x-rays, CT scan.  She is here with a family member today who is acting as a helpful and necessary historian.  I reviewed the notes from her family practice physician, Dr. Bauer from October 31, 2024, this discusses chronic back pain.    For complete plan and/or surgical details, please refer to Dr. Coyle's portion of this split dictation.    -Jose Luis Hernández PA-C    In a face-to-face encounter, I performed a history and physical examination, discussed pertinent diagnostic studies if indicated, and discussed diagnosis and management strategies with both the patient and the midlevel provider.  I reviewed the midlevel's note and agree with the documented findings and plan of care.    Patient with back pain only.  However she is in a wheelchair and says her legs get heavy and weak from time to time.  She thinks her back is her main issue but the legs do bother her.  I did an L5-S1 instrumented interbody fusion on her in 2019 with a laminectomy from L3-S1.  She now has severe adjacent level breakdown with a grade 2, approaching grade 3, spondylolisthesis at L4-5 2020 reviewed patient does mention her hip pain as well as significant retrolisthesis and kyphosis and endplate breakdown at L1 patient is an  exacerbation to 3 and L3-4 and even some at L1-2.  This patient is severely inhibited with bodily function.  We talked about extending her fusion approximately which I think is what she needs.  She had a recent DEXA scan which was normal.  We are going to order an MRI of her lumbar spine and get her to one of my colleagues downtown for evaluation for her neurogenic claudication type symptoms and back pain symptoms.  We did discuss and consider surgery on her today.    Eddie Coyle MD  Orthopedic surgery

## 2024-11-05 NOTE — TELEPHONE ENCOUNTER
Good Morning,  I sent a message on 10/31/24. This patient needs assistance scheduling a diabetic eye referral.  She called this morning and states no one has contacted her.    Please Advise      [phone]        Thank you

## 2024-11-06 DIAGNOSIS — Z12.11 COLON CANCER SCREENING: Primary | ICD-10-CM

## 2024-11-06 RX ORDER — POLYETHYLENE GLYCOL 3350, SODIUM SULFATE ANHYDROUS, SODIUM BICARBONATE, SODIUM CHLORIDE, POTASSIUM CHLORIDE 236; 22.74; 6.74; 5.86; 2.97 G/4L; G/4L; G/4L; G/4L; G/4L
354 POWDER, FOR SOLUTION ORAL ONCE
Qty: 354 ML | Refills: 0 | Status: SHIPPED | OUTPATIENT
Start: 2024-11-06 | End: 2024-11-06

## 2024-11-07 ENCOUNTER — ANESTHESIA (OUTPATIENT)
Dept: GASTROENTEROLOGY | Facility: EXTERNAL LOCATION | Age: 59
End: 2024-11-07

## 2024-11-07 ENCOUNTER — APPOINTMENT (OUTPATIENT)
Dept: GASTROENTEROLOGY | Facility: EXTERNAL LOCATION | Age: 59
End: 2024-11-07
Payer: MEDICARE

## 2024-11-08 ENCOUNTER — TELEPHONE (OUTPATIENT)
Dept: SURGERY | Facility: CLINIC | Age: 59
End: 2024-11-08
Payer: MEDICARE

## 2024-11-08 DIAGNOSIS — Z12.11 COLON CANCER SCREENING: Primary | ICD-10-CM

## 2024-11-08 RX ORDER — POLYETHYLENE GLYCOL 3350, SODIUM SULFATE ANHYDROUS, SODIUM BICARBONATE, SODIUM CHLORIDE, POTASSIUM CHLORIDE 236; 22.74; 6.74; 5.86; 2.97 G/4L; G/4L; G/4L; G/4L; G/4L
4 POWDER, FOR SOLUTION ORAL ONCE
Qty: 4000 ML | Refills: 0 | Status: SHIPPED | OUTPATIENT
Start: 2024-11-08 | End: 2024-11-08

## 2024-11-08 NOTE — TELEPHONE ENCOUNTER
Pt called regarding insurance approving sutabs and would like [facility] to [facility] [place].

## 2024-11-11 DIAGNOSIS — Z12.11 COLON CANCER SCREENING: Primary | ICD-10-CM

## 2024-11-11 RX ORDER — SOD SULF/POT CHLORIDE/MAG SULF 1.479 G
12 TABLET ORAL 2 TIMES DAILY
Qty: 24 TABLET | Refills: 0 | Status: SHIPPED | OUTPATIENT
Start: 2024-11-11

## 2024-11-12 ENCOUNTER — APPOINTMENT (OUTPATIENT)
Dept: PRIMARY CARE | Facility: CLINIC | Age: 59
End: 2024-11-12
Payer: MEDICARE

## 2024-11-12 DIAGNOSIS — E11.42 TYPE 2 DIABETES MELLITUS WITH DIABETIC POLYNEUROPATHY, WITHOUT LONG-TERM CURRENT USE OF INSULIN: ICD-10-CM

## 2024-11-12 DIAGNOSIS — J44.9 CHRONIC OBSTRUCTIVE PULMONARY DISEASE, UNSPECIFIED COPD TYPE (MULTI): ICD-10-CM

## 2024-11-12 RX ORDER — DULAGLUTIDE 3 MG/.5ML
3 INJECTION, SOLUTION SUBCUTANEOUS
Qty: 2 ML | Refills: 5 | Status: SHIPPED | OUTPATIENT
Start: 2024-11-12

## 2024-11-12 RX ORDER — ALBUTEROL SULFATE 90 UG/1
INHALANT RESPIRATORY (INHALATION)
Qty: 18 G | Refills: 5 | Status: SHIPPED | OUTPATIENT
Start: 2024-11-12

## 2024-11-15 ENCOUNTER — OFFICE VISIT (OUTPATIENT)
Dept: PRIMARY CARE | Facility: CLINIC | Age: 59
End: 2024-11-15
Payer: MEDICARE

## 2024-11-15 VITALS
OXYGEN SATURATION: 97 % | DIASTOLIC BLOOD PRESSURE: 64 MMHG | HEART RATE: 60 BPM | BODY MASS INDEX: 29.49 KG/M2 | SYSTOLIC BLOOD PRESSURE: 128 MMHG | WEIGHT: 151 LBS | TEMPERATURE: 97.9 F

## 2024-11-15 DIAGNOSIS — M48.061 SPINAL STENOSIS OF LUMBAR REGION, UNSPECIFIED WHETHER NEUROGENIC CLAUDICATION PRESENT: Primary | ICD-10-CM

## 2024-11-15 DIAGNOSIS — E11.42 DIABETIC POLYNEUROPATHY ASSOCIATED WITH TYPE 2 DIABETES MELLITUS (MULTI): ICD-10-CM

## 2024-11-15 PROCEDURE — 3078F DIAST BP <80 MM HG: CPT | Performed by: FAMILY MEDICINE

## 2024-11-15 PROCEDURE — 4004F PT TOBACCO SCREEN RCVD TLK: CPT | Performed by: FAMILY MEDICINE

## 2024-11-15 PROCEDURE — 99213 OFFICE O/P EST LOW 20 MIN: CPT | Performed by: FAMILY MEDICINE

## 2024-11-15 PROCEDURE — 4010F ACE/ARB THERAPY RXD/TAKEN: CPT | Performed by: FAMILY MEDICINE

## 2024-11-15 PROCEDURE — 3074F SYST BP LT 130 MM HG: CPT | Performed by: FAMILY MEDICINE

## 2024-11-15 PROCEDURE — G2211 COMPLEX E/M VISIT ADD ON: HCPCS | Performed by: FAMILY MEDICINE

## 2024-11-15 RX ORDER — VENLAFAXINE HYDROCHLORIDE 75 MG/1
1 CAPSULE, EXTENDED RELEASE ORAL
COMMUNITY
Start: 2024-11-06

## 2024-11-15 ASSESSMENT — ENCOUNTER SYMPTOMS
NUMBNESS: 1
COUGH: 0
BACK PAIN: 1
FEVER: 0

## 2024-11-15 NOTE — PROGRESS NOTES
Subjective   Patient ID: Rachael Ruiz is a 59 y.o. female who presents for Wheelchair Eval.    HPI   She is here today for wheelchair evaluation.  She needs an order for a new manual wheelchair  Past medical history is significant for low back pain which has been present since age 17.  She has been diagnosed with lumbar spondylosis and spinal stenosis in the past.  She previously had a laminectomy in 2019.  She is currently following with orthopedics and they did refer her to a different specialist to possibly discuss other management options.  She is still having back pain which now is an 8 out of 10 intensity.  This is located bilateral lower back.  This is an aching and constant pain.  She does have weakness involving both legs, right greater than left  She also has a history of diabetic neuropathy currently taking gabapentin.  Neuropathy extends to the level of the knee on the right side and just above the knee on the left side  She typically uses her wheelchair approximately 12 hours/day.  She is able to ambulate short distances    Review of Systems   Constitutional:  Negative for fever.   Respiratory:  Negative for cough.    Musculoskeletal:  Positive for back pain.   Neurological:  Positive for numbness.       Objective   /64   Pulse 60   Temp 36.6 °C (97.9 °F) (Temporal)   Wt 68.5 kg (151 lb)   SpO2 97%   BMI 29.49 kg/m²     Physical Exam  Vitals reviewed.   Constitutional:       General: She is not in acute distress.     Appearance: Normal appearance. She is well-developed.   HENT:      Head: Normocephalic.   Eyes:      Conjunctiva/sclera: Conjunctivae normal.   Cardiovascular:      Rate and Rhythm: Normal rate and regular rhythm.      Heart sounds: Normal heart sounds.   Pulmonary:      Effort: Pulmonary effort is normal.      Breath sounds: Normal breath sounds.   Musculoskeletal:         General: Tenderness present.      Right lower leg: No edema.      Left lower leg: No edema.       Comments: Currently in wheelchair  Lumbar spine: There is tenderness involving the bilateral L2-L5 lumbar paraspinals  Decreased strength involving both lower extremities, right greater than left  She has decreased sensation to light touch involving both lower extremities below the level of the knee   Skin:     Findings: No rash.   Neurological:      Mental Status: She is alert.   Psychiatric:         Mood and Affect: Mood normal.         Behavior: Behavior normal.         Assessment/Plan   Assessment & Plan  Spinal stenosis of lumbar region, unspecified whether neurogenic claudication present         Diabetic polyneuropathy associated with type 2 diabetes mellitus (Multi)         She has limited mobility due to chronic back pain with history of lumbar spondylosis and spinal stenosis with previous laminectomy, as well as diabetic neuropathy, and would benefit from having a manual wheelchair.  I completed paperwork for this today.  Continue to follow with orthopedics and she will call us if any further issues getting her wheelchair

## 2024-11-18 ENCOUNTER — PATIENT OUTREACH (OUTPATIENT)
Dept: PRIMARY CARE | Facility: CLINIC | Age: 59
End: 2024-11-18
Payer: MEDICARE

## 2024-11-25 ENCOUNTER — TELEPHONE (OUTPATIENT)
Dept: PRIMARY CARE | Facility: CLINIC | Age: 59
End: 2024-11-25
Payer: MEDICARE

## 2024-11-25 ENCOUNTER — TELEPHONE (OUTPATIENT)
Dept: GASTROENTEROLOGY | Facility: EXTERNAL LOCATION | Age: 59
End: 2024-11-25

## 2024-11-25 NOTE — TELEPHONE ENCOUNTER
Pt requesting refill on Breo inhaler.   Also wanted to let you know he missed trulicity dose on last week and can not take it this week due to having surgery. Also start taking subtab and wants to know if this is okay with all other meds.

## 2024-11-26 DIAGNOSIS — G25.81 RESTLESS LEGS SYNDROME: ICD-10-CM

## 2024-11-26 RX ORDER — ROPINIROLE 1 MG/1
2 TABLET, FILM COATED ORAL NIGHTLY
Qty: 60 TABLET | Refills: 3 | Status: SHIPPED | OUTPATIENT
Start: 2024-11-26

## 2024-11-27 ENCOUNTER — APPOINTMENT (OUTPATIENT)
Dept: GASTROENTEROLOGY | Facility: EXTERNAL LOCATION | Age: 59
End: 2024-11-27
Payer: MEDICARE

## 2024-12-02 ENCOUNTER — APPOINTMENT (OUTPATIENT)
Dept: PRIMARY CARE | Facility: CLINIC | Age: 59
End: 2024-12-02
Payer: MEDICARE

## 2024-12-03 ENCOUNTER — APPOINTMENT (OUTPATIENT)
Dept: PRIMARY CARE | Facility: CLINIC | Age: 59
End: 2024-12-03
Payer: MEDICARE

## 2024-12-03 ENCOUNTER — TELEPHONE (OUTPATIENT)
Dept: ORTHOPEDIC SURGERY | Facility: CLINIC | Age: 59
End: 2024-12-03

## 2024-12-03 DIAGNOSIS — M54.16 LUMBAR RADICULOPATHY: Primary | ICD-10-CM

## 2024-12-03 NOTE — TELEPHONE ENCOUNTER
Pt called with questions about her stenosis diagnosis. She has been reading about it and is has concerns that it is fatal. She would like a call to explain what it is. [phone]

## 2024-12-04 ENCOUNTER — APPOINTMENT (OUTPATIENT)
Dept: GASTROENTEROLOGY | Facility: EXTERNAL LOCATION | Age: 59
End: 2024-12-04
Payer: MEDICARE

## 2024-12-05 ENCOUNTER — TELEPHONE (OUTPATIENT)
Dept: GASTROENTEROLOGY | Facility: CLINIC | Age: 59
End: 2024-12-05

## 2024-12-05 ENCOUNTER — APPOINTMENT (OUTPATIENT)
Dept: PRIMARY CARE | Facility: CLINIC | Age: 59
End: 2024-12-05
Payer: MEDICARE

## 2024-12-05 ENCOUNTER — APPOINTMENT (OUTPATIENT)
Dept: GASTROENTEROLOGY | Facility: EXTERNAL LOCATION | Age: 59
End: 2024-12-05
Payer: MEDICARE

## 2024-12-05 NOTE — TELEPHONE ENCOUNTER
Patient called and stated that she was throwing up Sutab prep for colonoscopy, and was told to use Miralax and gatorade instead. It hasn't worked, and she is unable to prepare for colonoscopy. Please review. Thank you.

## 2024-12-05 NOTE — TELEPHONE ENCOUNTER
Pt left VM this am. She states that they were unable to do her colonoscopy due to her not being clear enough. She took Miralax and drank Gateraide per Dr Aguirre's suggestion and she is still having solid bms this am. She took the prep prior to the colonoscopy and was unable to keep it all down. She states that she is not able to to drink the Golytely. She wants to know what else she can do to get cleared out. She also states that she was constipated 2 days prior to her colonoscopy. I sent a my chart message to patient that I would contact Dr Aguirre to see what we can do and get back to her.

## 2024-12-06 DIAGNOSIS — E78.5 HYPERLIPIDEMIA, UNSPECIFIED HYPERLIPIDEMIA TYPE: ICD-10-CM

## 2024-12-06 RX ORDER — ATORVASTATIN CALCIUM 80 MG/1
80 TABLET, FILM COATED ORAL DAILY
Qty: 90 TABLET | Refills: 3 | Status: SHIPPED | OUTPATIENT
Start: 2024-12-06

## 2024-12-09 ENCOUNTER — TELEPHONE (OUTPATIENT)
Dept: PRIMARY CARE | Facility: CLINIC | Age: 59
End: 2024-12-09
Payer: MEDICARE

## 2024-12-09 DIAGNOSIS — E53.8 B12 DEFICIENCY: Primary | ICD-10-CM

## 2024-12-09 RX ORDER — LANOLIN ALCOHOL/MO/W.PET/CERES
1000 CREAM (GRAM) TOPICAL DAILY
Qty: 30 TABLET | Refills: 11 | Status: SHIPPED | OUTPATIENT
Start: 2024-12-09

## 2024-12-09 NOTE — TELEPHONE ENCOUNTER
Call to patient. Unable to reach to let her know her virtual telephone appointment for 12/10 with Dr Aguirre had to be moved to 12/11 at 3:30pm. Advised her to call the office if she needs to reschedule. Sent message via [employer].

## 2024-12-10 ENCOUNTER — APPOINTMENT (OUTPATIENT)
Dept: GASTROENTEROLOGY | Facility: CLINIC | Age: 59
End: 2024-12-10
Payer: MEDICARE

## 2024-12-10 DIAGNOSIS — J41.0 SIMPLE CHRONIC BRONCHITIS (MULTI): ICD-10-CM

## 2024-12-11 ENCOUNTER — OFFICE VISIT (OUTPATIENT)
Dept: GASTROENTEROLOGY | Facility: CLINIC | Age: 59
End: 2024-12-11
Payer: MEDICARE

## 2024-12-11 ENCOUNTER — TELEMEDICINE (OUTPATIENT)
Dept: PRIMARY CARE | Facility: CLINIC | Age: 59
End: 2024-12-11
Payer: MEDICARE

## 2024-12-11 DIAGNOSIS — R19.5 POSITIVE COLORECTAL CANCER SCREENING USING COLOGUARD TEST: ICD-10-CM

## 2024-12-11 DIAGNOSIS — J44.9 CHRONIC OBSTRUCTIVE PULMONARY DISEASE, UNSPECIFIED COPD TYPE (MULTI): Primary | ICD-10-CM

## 2024-12-11 DIAGNOSIS — K59.09 OTHER CONSTIPATION: Primary | ICD-10-CM

## 2024-12-11 DIAGNOSIS — Z12.31 BREAST CANCER SCREENING BY MAMMOGRAM: ICD-10-CM

## 2024-12-11 DIAGNOSIS — Z87.891 PERSONAL HISTORY OF NICOTINE DEPENDENCE: ICD-10-CM

## 2024-12-11 PROCEDURE — 4004F PT TOBACCO SCREEN RCVD TLK: CPT | Performed by: FAMILY MEDICINE

## 2024-12-11 PROCEDURE — 4010F ACE/ARB THERAPY RXD/TAKEN: CPT | Performed by: FAMILY MEDICINE

## 2024-12-11 PROCEDURE — 99204 OFFICE O/P NEW MOD 45 MIN: CPT | Performed by: INTERNAL MEDICINE

## 2024-12-11 PROCEDURE — 4010F ACE/ARB THERAPY RXD/TAKEN: CPT | Performed by: INTERNAL MEDICINE

## 2024-12-11 PROCEDURE — 99442 PR PHYS/QHP TELEPHONE EVALUATION 11-20 MIN: CPT | Performed by: FAMILY MEDICINE

## 2024-12-11 PROCEDURE — 4004F PT TOBACCO SCREEN RCVD TLK: CPT | Performed by: INTERNAL MEDICINE

## 2024-12-11 RX ORDER — FLUTICASONE FUROATE AND VILANTEROL 200; 25 UG/1; UG/1
POWDER RESPIRATORY (INHALATION)
Qty: 60 EACH | Refills: 3 | OUTPATIENT
Start: 2024-12-11

## 2024-12-11 RX ORDER — SODIUM, POTASSIUM,MAG SULFATES 17.5-3.13G
2 SOLUTION, RECONSTITUTED, ORAL ORAL 2 TIMES DAILY
Qty: 2 EACH | Refills: 0 | Status: SHIPPED | OUTPATIENT
Start: 2024-12-11

## 2024-12-11 ASSESSMENT — ENCOUNTER SYMPTOMS
NEUROLOGICAL NEGATIVE: 1
CARDIOVASCULAR NEGATIVE: 1
EYES NEGATIVE: 1
MUSCULOSKELETAL NEGATIVE: 1
COUGH: 1
WHEEZING: 1
FEVER: 0
ENDOCRINE NEGATIVE: 1
CONSTITUTIONAL NEGATIVE: 1
PSYCHIATRIC NEGATIVE: 1
RESPIRATORY NEGATIVE: 1
ROS GI COMMENTS: AS IN HPI

## 2024-12-11 NOTE — PROGRESS NOTES
Subjective   Virtual or Telephone Consent    A telephone visit (audio only) between the patient (at the originating site) and the provider (at the distant site) was utilized to provide this telehealth service.   Verbal consent was requested and obtained from Rachael Ruiz on this date, 12/11/24 for a telehealth visit.      History of Present Illness:   Rachael Ruiz is a 59 y.o. female who presents to GI clinic for colonoscopy  Had a positive cologuard tried to do the prep more than once could not get it done   She had a colonoscopy 5 or 6 years ago and it was incomplete because of difficulty in the bowel prep and difficulty   She got the sutab and finished half of it but she threw up some of it  and she also got miralax and gatorade and still it was coming out brown and lumps   She usually has chronic constipation every other day or every 2 days   Is on suboxone on a regular basis   Does not take anything at home for constipation         Review of Systems  Review of Systems   Constitutional: Negative.    HENT: Negative.     Eyes: Negative.    Respiratory: Negative.     Cardiovascular: Negative.    Gastrointestinal:         As in HPI    Endocrine: Negative.    Genitourinary: Negative.    Musculoskeletal: Negative.    Skin: Negative.    Neurological: Negative.    Psychiatric/Behavioral: Negative.         Past Medical History   has a past medical history of Allergic (2017), Anemia, Anxiety (1985), Arthritis (2017), Cataract (2018), COPD (chronic obstructive pulmonary disease) (Multi) (2017), CTS (carpal tunnel syndrome) (2010?), Depression (1985), Diabetes mellitus (Multi) (1986), Headache (1986), HL (hearing loss) (1998), Hypertension (1986), Neuromuscular disorder (Multi), Personal history of nicotine dependence (08/12/2021), Rheumatoid arthritis, Scoliosis (2020), Spinal stenosis (2016), Substance abuse (Multi) (2015), Varicella, and Visual impairment (1970).     Social History   reports that she has been  "smoking cigarettes. She started smoking about 50 years ago. She has a 25.2 pack-year smoking history. She has never used smokeless tobacco. She reports that she does not currently use alcohol. She reports that she does not currently use drugs after having used the following drugs: \"Crack\" cocaine, Cocaine, Hydrocodone, Marijuana, Morphine, and Oxycodone.     Family History  family history includes Alcohol abuse in her father; Depression in her father's sister; Diabetes in her father and father's sister; Hypertension in her father's sister; Ovarian cancer in her sister; Skin cancer in her mother; cardiac disorder in her father. She was adopted.     Allergies  Allergies   Allergen Reactions    Naproxen Hives    Nsaids (Non-Steroidal Anti-Inflammatory Drug) Unknown    Pregabalin Unknown    Sulfa (Sulfonamide Antibiotics) Hives    Tramadol Hives       Medications  Current Outpatient Medications   Medication Instructions    albuterol 2.5 mg /3 mL (0.083 %) nebulizer solution USE 1 VIAL VIA NEBULIZER EVERY 4 HOURS AS NEEDED FOR WHEEZING or shortness OF breath    albuterol 90 mcg/actuation inhaler INHALE 1 TO 2 PUFFS EVERY 4 TO 6 HOURS AS NEEDED FOR WHEEZING or SHORTNESS OF BREATH    ALPRAZolam (Xanax) 0.5 mg tablet Take 1 tablet twice a day for 30 days.    aspirin 81 mg EC tablet 1 tablet, Daily    atorvastatin (LIPITOR) 80 mg, oral, Daily    blood sugar diagnostic (OneTouch Ultra Test) strip TEST 4 TIMES DAILY    blood-glucose meter misc 1 kit, miscellaneous, 3 times daily    buprenorphine-naloxone (Suboxone) 8-2 mg SL tablet 1 tablet, 2 times daily    cholecalciferol (Vitamin D-3) 50 mcg (2,000 unit) capsule 1 capsule, Nightly    cyanocobalamin (VITAMIN B-12) 1,000 mcg, oral, Daily    docosahexaenoic acid/epa (FISH OIL ORAL) 1,000 mg, 2 times daily    DULoxetine (CYMBALTA) 20 mg, Daily    fluticasone (Flonase) 50 mcg/actuation nasal spray 2 sprays, Each Nostril, Daily, Shake gently. Before first use, prime pump. After " use, clean tip and replace cap.    gabapentin (NEURONTIN) 800 mg, oral, 4 times daily    hydroCHLOROthiazide (MICROZIDE) 12.5 mg, oral, Daily    lidocaine (Lidoderm) 5 % patch 1 patch, transdermal, Daily, Remove & discard patch within 12 hours or as directed by MD.    losartan (COZAAR) 100 mg, oral, Daily    metFORMIN (GLUCOPHAGE) 1,000 mg, oral, 2 times daily (morning and late afternoon)    nebulizers ( Nebulizer-Unvrsl Tubing) misc 1 Tube, miscellaneous, Daily PRN    potassium chloride CR (Klor-Con M20) 20 mEq ER tablet 20 mEq, oral, Daily, Do not crush or chew.    QUEtiapine (SEROquel) 25 mg tablet TAKE 1 TABLET BY MOUTH EVERY DAY AT BEDTIME for 30 days    rOPINIRole (REQUIP) 2 mg, oral, Nightly    Spiriva Respimat 2.5 mcg/actuation inhaler inhale 2 (TWO) puffs into the lungs EVERY DAY AS DIRECTED    Trulicity 3 mg, subcutaneous, Once Weekly    venlafaxine XR (Effexor-XR) 75 mg 24 hr capsule 1 capsule, Daily (0630)    WHEELCHAIR MISC 1 Device, Daily       Objective   No PE was done since was virtual             Assessment/Plan   Rachael Ruiz is a 59 y.o. female who presents to GI clinic for evaluation for colonoscopy.  She has had a positive Cologuard and she is a colonoscopy.  She also has chronic constipation.  I told her to use MiraLAX every day for chronic constipation.  Will give her a 2-day Suprep.  I went over this with her.  Will schedule for colonoscopy.  .          Jj Aguirre MD

## 2024-12-11 NOTE — PROGRESS NOTES
Subjective   Patient ID: Rachael Ruiz is a 59 y.o. female who presents for No chief complaint on file..    Cough  This is a chronic problem. The current episode started more than 1 year ago. Associated symptoms include wheezing. Pertinent negatives include no fever.        Virtual or Telephone Consent    A telephone visit (audio only) between the patient (at the originating site) and the provider (at the distant site) was utilized to provide this telehealth service.   Verbal consent was requested and obtained from Rachael Ruiz on this date, 12/11/24 for a telehealth visit.       She is here today to discuss COPD medications  She has a history of COPD diagnosed several years ago.  She is currently taking Spiriva daily and albuterol as needed  She had previously been taking Breo as well, but has not been using this inhaler in the past 2 months  She denies any worsening COPD symptoms since stopping this inhaler.  She has not had any recent significant COPD exacerbations in the past year.  She will typically get cough and wheezing that requires albuterol use approximately once per month.  This can be triggered by allergies  She is a current smoker.  She has smoked on average 1 pack/day for 40 years.  Currently she is smoking 1/2 pack/day  She has been trying to work on cutting back with smoking      Review of Systems   Constitutional:  Negative for fever.   Respiratory:  Positive for cough and wheezing.        Objective   There were no vitals taken for this visit.    Physical Exam today's visit was done over the telephone, exam was not done    Assessment/Plan   Assessment & Plan  Chronic obstructive pulmonary disease, unspecified COPD type (Multi)         We had a long discussion today regarding her symptoms and her medications.  She has not been taking Breo over the past 2 months, and her COPD has remained well-controlled, and she has only had to use her albuterol inhaler on average once per month.  At this  point I do not feel that she needs to restart the Breo.  We will continue Spiriva and albuterol only, and continue to monitor her symptoms.  Call or follow-up if any worsening cough or wheezing.  We also discussed the importance of complete tobacco cessation  She is due for her annual low-dose CT, as well as her screening mammogram, and these were ordered today  She is going to follow-up with me next month for a recheck and annual wellness visit  Total time spent today was approximately 15 minutes

## 2024-12-17 ENCOUNTER — APPOINTMENT (OUTPATIENT)
Dept: OPHTHALMOLOGY | Facility: CLINIC | Age: 59
End: 2024-12-17
Payer: MEDICARE

## 2024-12-27 ENCOUNTER — PATIENT OUTREACH (OUTPATIENT)
Dept: PRIMARY CARE | Facility: CLINIC | Age: 59
End: 2024-12-27
Payer: MEDICARE

## 2024-12-30 ENCOUNTER — APPOINTMENT (OUTPATIENT)
Dept: RADIOLOGY | Facility: HOSPITAL | Age: 59
End: 2024-12-30
Payer: MEDICARE

## 2025-01-10 ENCOUNTER — APPOINTMENT (OUTPATIENT)
Dept: PRIMARY CARE | Facility: CLINIC | Age: 60
End: 2025-01-10
Payer: MEDICARE

## 2025-01-10 VITALS
WEIGHT: 158 LBS | BODY MASS INDEX: 30.86 KG/M2 | TEMPERATURE: 97.5 F | OXYGEN SATURATION: 97 % | DIASTOLIC BLOOD PRESSURE: 79 MMHG | HEART RATE: 76 BPM | SYSTOLIC BLOOD PRESSURE: 127 MMHG

## 2025-01-10 DIAGNOSIS — E53.8 B12 DEFICIENCY: ICD-10-CM

## 2025-01-10 DIAGNOSIS — I10 ESSENTIAL HYPERTENSION, BENIGN: ICD-10-CM

## 2025-01-10 DIAGNOSIS — F11.21 OPIOID DEPENDENCE, IN REMISSION: ICD-10-CM

## 2025-01-10 DIAGNOSIS — Z00.00 ROUTINE GENERAL MEDICAL EXAMINATION AT HEALTH CARE FACILITY: Primary | ICD-10-CM

## 2025-01-10 DIAGNOSIS — J42 CHRONIC BRONCHITIS, UNSPECIFIED CHRONIC BRONCHITIS TYPE (MULTI): ICD-10-CM

## 2025-01-10 DIAGNOSIS — E11.51 TYPE 2 DIABETES MELLITUS WITH DIABETIC PERIPHERAL ANGIOPATHY WITHOUT GANGRENE, WITHOUT LONG-TERM CURRENT USE OF INSULIN (MULTI): ICD-10-CM

## 2025-01-10 DIAGNOSIS — F31.9 BIPOLAR AFFECTIVE DISORDER, REMISSION STATUS UNSPECIFIED (MULTI): ICD-10-CM

## 2025-01-10 PROBLEM — E87.6 HYPOKALEMIA: Status: RESOLVED | Noted: 2024-10-04 | Resolved: 2025-01-10

## 2025-01-10 PROBLEM — D72.820 LYMPHOCYTOSIS: Status: RESOLVED | Noted: 2023-01-25 | Resolved: 2025-01-10

## 2025-01-10 PROBLEM — E83.42 HYPOMAGNESEMIA: Status: RESOLVED | Noted: 2024-10-04 | Resolved: 2025-01-10

## 2025-01-10 PROBLEM — R92.8 ABNORMAL MAMMOGRAM: Status: RESOLVED | Noted: 2023-01-25 | Resolved: 2025-01-10

## 2025-01-10 PROBLEM — J01.40 ACUTE NON-RECURRENT PANSINUSITIS: Status: RESOLVED | Noted: 2024-10-04 | Resolved: 2025-01-10

## 2025-01-10 PROBLEM — J44.9 COPD (CHRONIC OBSTRUCTIVE PULMONARY DISEASE) (MULTI): Status: RESOLVED | Noted: 2023-01-25 | Resolved: 2025-01-10

## 2025-01-10 RX ORDER — ALPRAZOLAM 0.25 MG/1
TABLET ORAL
COMMUNITY
Start: 2025-01-03

## 2025-01-10 RX ORDER — DULOXETIN HYDROCHLORIDE 30 MG/1
30 CAPSULE, DELAYED RELEASE ORAL
COMMUNITY
Start: 2025-01-07

## 2025-01-10 ASSESSMENT — ACTIVITIES OF DAILY LIVING (ADL)
DRESSING: INDEPENDENT
GROCERY_SHOPPING: INDEPENDENT
TAKING_MEDICATION: INDEPENDENT
MANAGING_FINANCES: INDEPENDENT
BATHING: INDEPENDENT
DOING_HOUSEWORK: INDEPENDENT

## 2025-01-10 ASSESSMENT — ENCOUNTER SYMPTOMS
ABDOMINAL PAIN: 0
COUGH: 1
WHEEZING: 0
FEVER: 0
NUMBNESS: 1
BACK PAIN: 1

## 2025-01-10 ASSESSMENT — PATIENT HEALTH QUESTIONNAIRE - PHQ9
SUM OF ALL RESPONSES TO PHQ9 QUESTIONS 1 AND 2: 1
2. FEELING DOWN, DEPRESSED OR HOPELESS: SEVERAL DAYS
1. LITTLE INTEREST OR PLEASURE IN DOING THINGS: NOT AT ALL
10. IF YOU CHECKED OFF ANY PROBLEMS, HOW DIFFICULT HAVE THESE PROBLEMS MADE IT FOR YOU TO DO YOUR WORK, TAKE CARE OF THINGS AT HOME, OR GET ALONG WITH OTHER PEOPLE: NOT DIFFICULT AT ALL

## 2025-01-10 NOTE — ASSESSMENT & PLAN NOTE
Stable based on symptoms and exam.  Continue established treatment plan and follow-up at least yearly  Currently follows with addiction medicine and takes Suboxone.  She denies any recent cravings or other concerns

## 2025-01-10 NOTE — ASSESSMENT & PLAN NOTE
Stable based on symptoms and exam.  Continue established treatment plan and follow-up at least yearly  Currently follows with psychiatry and she reports that this has been stable.  Denies any recent mood swings

## 2025-01-10 NOTE — PROGRESS NOTES
Subjective   Patient ID: Rachael Ruiz is a 59 y.o. female who presents for Annual Exam (PHQ2- Negative /Fall Screen- Positive /Advanced Care Plan - Inactive ).    HPI         She is here today for annual wellness visit, as well as routine follow-up  COPD: Currently takes Spiriva daily and albuterol as needed.  She denies any recent COPD flareups in the past year.  She will use her albuterol on average 2 times per week  She is still smoking.  Has averaged 1 pack/day x 40 years.  Currently smokes approximately 1/2 pack/day  She is following with orthopedics for chronic low back pain and is going to be having an MRI done later this month  Diabetes mellitus: Currently takes Trulicity 3 mg weekly and metformin 1000 mg twice daily.  Her last A1c checked 10/31/2024 was 6.0%.  Home glucose readings have ranged from .  She has been referred to ophthalmology for diabetic eye exam  She is taking gabapentin 800 mg 4 times per day for diabetic eye exam  She is taking gabapentin 800 mg 4 times per day for diabetic neuropathy.  This medication was started by neurology in the past.  She feels that the medication does help.  She admits to neuropathy symptoms involving both lower extremities extending up to the mid thighs.  Pain is 9 out of 10 intensity when she does not take gabapentin, but improved to a 2 out of 10 intensity pain when she takes this med.  No adverse effects with gabapentin  Hypertension: Currently takes losartan 100 mg daily and HCTZ 12.5 mg daily.  No elevated BP readings on the office  Hyperlipidemia: Takes atorvastatin 80 mg daily.  Last LDL checked 10/1/2023 showed LDL of 43  She is currently taking daily p.o. vitamin B12 1000 mcg for B12 deficiency.  Last B12 level checked 10/1/2023 was normal range  She had a positive Cologuard in October.  She is scheduled to have a colonoscopy next month  Last mammogram was done 12/2023.  This is category 1.  She is scheduled to have a repeat mammogram  Received  Tdap vaccine in 2021 and pneumococcal vaccine in 2023.  She is up-to-date on the shingles vaccine.  She would like a influenza vaccine today    Patient Health Questionnaire-2 Score: 1 (1/10/2025 10:11 AM)      Review of Systems   Constitutional:  Negative for fever.   Respiratory:  Positive for cough (Recent URI, improving). Negative for wheezing.    Cardiovascular:  Negative for chest pain.   Gastrointestinal:  Negative for abdominal pain.   Musculoskeletal:  Positive for back pain.   Skin:  Negative for rash.   Neurological:  Positive for numbness.       Objective   /79   Pulse 76   Temp 36.4 °C (97.5 °F) (Temporal)   Wt 71.7 kg (158 lb)   SpO2 97%   BMI 30.86 kg/m²     Physical Exam  Vitals reviewed.   Constitutional:       General: She is not in acute distress.     Appearance: Normal appearance. She is well-developed.   HENT:      Head: Normocephalic.      Right Ear: Tympanic membrane, ear canal and external ear normal.      Left Ear: Tympanic membrane, ear canal and external ear normal.      Nose: Nose normal.      Mouth/Throat:      Mouth: Mucous membranes are moist.   Eyes:      Conjunctiva/sclera: Conjunctivae normal.   Neck:      Thyroid: No thyromegaly.      Vascular: No JVD.   Cardiovascular:      Rate and Rhythm: Normal rate and regular rhythm.      Heart sounds: Normal heart sounds.   Pulmonary:      Effort: Pulmonary effort is normal.      Breath sounds: Normal breath sounds.   Abdominal:      Palpations: Abdomen is soft.      Tenderness: There is no abdominal tenderness.   Musculoskeletal:      Right lower leg: No edema.      Left lower leg: No edema.      Comments: In wheelchair   Lymphadenopathy:      Cervical: No cervical adenopathy.   Skin:     Findings: No rash.   Neurological:      Mental Status: She is alert and oriented to person, place, and time.      Sensory: No sensory deficit.   Psychiatric:         Mood and Affect: Mood normal.         Behavior: Behavior normal.          Assessment/Plan   Assessment & Plan  Routine general medical examination at health care facility    Orders:    1 Year Follow Up In [facility] - PCP - Wellness Exam; Future    Type 2 diabetes mellitus with diabetic peripheral angiopathy without gangrene, without long-term current use of insulin (Multi)  This is controlled and last A1c was in target range at 6.0%.         Opioid dependence, in remission  Stable based on symptoms and exam.  Continue established treatment plan and follow-up at least yearly  Currently follows with addiction medicine and takes Suboxone.  She denies any recent cravings or other concerns         Bipolar affective disorder, remission status unspecified (Multi)  Stable based on symptoms and exam.  Continue established treatment plan and follow-up at least yearly  Currently follows with psychiatry and she reports that this has been stable.  Denies any recent mood swings         B12 deficiency         Essential hypertension, benign              #1 preventative health:  Healthy diet and regular exercise recommended  Discussed the importance of tobacco cessation  She is going to be having a colonoscopy next month due to positive Cologuard  Annual low-dose CT lung cancer screening and mammogram have been ordered  Pap testing not needed due to previous hysterectomy  Up-to-date on tetanus, pneumococcal and shingles vaccines.  Given flu vaccine today  2.  COPD: This has been stable, continue Spiriva and albuterol as needed.  As above we discussed the importance of tobacco cessation  #3 diabetes mellitus: Last A1c was at goal at 6.0%.  Continue Trulicity and metformin.  It is too early to recheck her A1c today.  Will plan on rechecking this at her follow-up in 2 months  Diabetic neuropathy: Stable on gabapentin.  Will continue current dose.  She is scheduled to see a neurologist in April  Hypertension: This is controlled with blood pressure of 127/79.  Continue losartan and  hydrochlorothiazide  B12 deficiency: Last B12 level was normal.  Continue daily p.o. vitamin B12  Hyperlipidemia: Last LDL was at goal at 43.  Continue atorvastatin  Full labs including lipid panel and UACR have been ordered.  She is going to be getting this done next week  Follow-up in 2 months for recheck

## 2025-01-15 ENCOUNTER — TELEPHONE (OUTPATIENT)
Dept: ORTHOPEDIC SURGERY | Facility: CLINIC | Age: 60
End: 2025-01-15
Payer: MEDICARE

## 2025-01-15 DIAGNOSIS — M54.50 LUMBAR PAIN: ICD-10-CM

## 2025-01-15 DIAGNOSIS — M54.16 LUMBAR RADICULOPATHY: ICD-10-CM

## 2025-01-15 NOTE — TELEPHONE ENCOUNTER
Patient lvm stating that Dr. Coyle has referred her to get an MRI of her back.  Patient states that she can't lay on her back.  She is not sure how she will get the MRI done without being able to lay on her back

## 2025-01-20 ENCOUNTER — APPOINTMENT (OUTPATIENT)
Dept: RADIOLOGY | Facility: HOSPITAL | Age: 60
End: 2025-01-20
Payer: MEDICARE

## 2025-01-24 ENCOUNTER — APPOINTMENT (OUTPATIENT)
Dept: RADIOLOGY | Facility: HOSPITAL | Age: 60
End: 2025-01-24
Payer: MEDICARE

## 2025-01-25 DIAGNOSIS — E87.6 HYPOKALEMIA: ICD-10-CM

## 2025-01-27 ENCOUNTER — APPOINTMENT (OUTPATIENT)
Dept: RADIOLOGY | Facility: HOSPITAL | Age: 60
End: 2025-01-27
Payer: MEDICARE

## 2025-01-27 RX ORDER — POTASSIUM CHLORIDE 20 MEQ/1
20 TABLET, EXTENDED RELEASE ORAL DAILY
Qty: 30 TABLET | Refills: 2 | Status: SHIPPED | OUTPATIENT
Start: 2025-01-27

## 2025-01-28 ENCOUNTER — APPOINTMENT (OUTPATIENT)
Dept: RADIOLOGY | Facility: HOSPITAL | Age: 60
End: 2025-01-28
Payer: MEDICARE

## 2025-01-29 ENCOUNTER — APPOINTMENT (OUTPATIENT)
Dept: PRIMARY CARE | Facility: CLINIC | Age: 60
End: 2025-01-29
Payer: MEDICARE

## 2025-01-29 DIAGNOSIS — M48.061 SPINAL STENOSIS OF LUMBAR REGION, UNSPECIFIED WHETHER NEUROGENIC CLAUDICATION PRESENT: Primary | ICD-10-CM

## 2025-01-29 DIAGNOSIS — E11.51 TYPE 2 DIABETES MELLITUS WITH DIABETIC PERIPHERAL ANGIOPATHY WITHOUT GANGRENE, WITHOUT LONG-TERM CURRENT USE OF INSULIN (MULTI): ICD-10-CM

## 2025-01-29 ASSESSMENT — ENCOUNTER SYMPTOMS
BACK PAIN: 1
NUMBNESS: 1

## 2025-01-29 NOTE — PROGRESS NOTES
Subjective   Patient ID: Rachael Ruiz is a 59 y.o. female who presents for No chief complaint on file..    Back Pain  This is a chronic problem. Associated symptoms include numbness.      Virtual or Telephone Consent    A telephone visit (audio only) between the patient (at the originating site) and the provider (at the distant site) was utilized to provide this telehealth service.   Verbal consent was requested and obtained from Rachael Ruiz on this date, 01/29/25 for a telehealth visit.     The purpose of today's visit is for medical documentation for seat lift mechanism  Past medical history is significant for COPD, diabetes mellitus, diabetic neuropathy, hypertension, hyperlipidemia, and chronic low back pain  Chronic back pain: She has a history of chronic low back pain which has been present since she was 17 years old.  She has previously been diagnosed with lumbar spinal stenosis and spondylosis, and underwent a laminectomy in 2019.  She is currently following with orthopedics, and has been referred to a different surgeon to discuss other management options.  A lumbar spine MRI has been ordered  Last CT lumbar spine done 10/4/2024 showed the following:  FINDINGS:  ALIGNMENT: Alignment is unchanged. No traumatic subluxation. Chronic  anterolisthesis of L4 on L5 and retrolisthesis of L3 on L4 and L2 on  L3. Similar levocurvature of the lumbar spine centered on L4-L5.  VERTEBRAE: No acute fractures. Similar extensive sclerosis with  endplate erosive changes at L1, L2, L3, and L4. Prior laminectomies  from L3-S1 with PSIF hardware from L5-S1 and disc spacer at L5-S1.  SPINAL CANAL: Severe disc height loss, facet arthropathy and  ligamentum flavum hypertrophy result in varying degrees of  moderate-to-severe central and neural foraminal stenosis. Findings  are most severe in the bilateral neural foramina of L2-L3 and L3 L4.  Central canal stenosis is worse at L2-L3. PREVERTEBRAL SOFT TISSUES:  No  prevertebral soft tissue swelling.      OTHER FINDINGS: Hysterectomy. Extensive atherosclerotic calcification  of the aorta and its branches. No aneurysm.      IMPRESSION:  No acute fracture or traumatic subluxation of the lumbar spine.      Additional findings are stable compared to prior exam from 04/07/2024  and detailed in the body of the report. If clinical concern persists,  consider MRI for better evaluation of neural foraminal and central  canal stenosis.    She has continued to have pain in her bilateral lower back.  Today it is severe, but normally is an 8-9 out of 10 intensity pain.  This is a constant aching pain.  Pain will radiate down her right leg.  Both of her legs are weak.  She takes Tylenol and ibuprofen as needed.  She did physical therapy several years ago without improvement.  She has a history of diabetic neuropathy.  Has neuropathy symptoms involving both lower extremities up to the level of the knee.  She is currently taking gabapentin for this.  She usually is in a wheelchair for 12 hours or more during the day.  She has difficulty walking any more than a very short distance due to her back pain and neuropathy symptoms.  Also uses a wheeled walker to help get around the house      We went through the CMS documentation requirements for her seat lift mechanism:  1:  Does the patient have severe arthritis of the hip or knee?  Previous imaging has shown severe arthritis and stenosis involving her lumbar spine.  She does not have any history of knee arthritis.  She previously had an x-ray of her bilateral hips in April 2024 which showed mild hip osteoarthritis  #2: Does the patient have a severe neuromuscular disease?  She has severe diabetic neuropathy involving her lower extremities, and is starting to notice neuropathy symptoms in her hands also.  She has been referred to neurology, and mentions that she is going to be having an EMG done soon  3.:  If the patient is completely incapable of  standing up from a regular armchair or any chair in his or her home?  She tells me that she is not able to get up from a chair unless someone is helping her or she is grabbing onto something for assistance  4.  Once standing, does the patient have the ability to ambulate?  She has difficulty ambulating even very short distances on her own, and usually needs to use a wheeled walker to help with ambulation  5.:  Have all the appropriate modalities to enable the patient to transfer from a chair to a standing position been trialed and failed?  Yes.  Previous treatment has included medication, physical therapy, surgery, and she is following with specialists including orthopedics    Review of Systems   Musculoskeletal:  Positive for back pain.   Neurological:  Positive for numbness.       Objective   There were no vitals taken for this visit.    Physical Exam    Assessment/Plan   Assessment & Plan  Spinal stenosis of lumbar region, unspecified whether neurogenic claudication present         Type 2 diabetes mellitus with diabetic peripheral angiopathy without gangrene, without long-term current use of insulin (Multi)         She has difficulty with ambulation and also difficulty getting up from a regular chair due to her history of lumbar spinal stenosis and spondylosis, as well as diabetic neuropathy involving both lower extremities, and would benefit from having a seat lift mechanism.  Today's visit was done virtually, however we did do a full musculoskeletal exam on her at previous visit 11/15/2024.  See above for all of the required documentation for seat lift mechanism.  Continue current care including following with orthopedics for low back pain and continue gabapentin for lower extremity neuropathy.  Will recheck her symptoms at follow-up in 2 months  Total time today was 16 minutes

## 2025-01-30 ENCOUNTER — APPOINTMENT (OUTPATIENT)
Dept: PRIMARY CARE | Facility: CLINIC | Age: 60
End: 2025-01-30
Payer: MEDICARE

## 2025-01-30 ENCOUNTER — TELEPHONE (OUTPATIENT)
Dept: PRIMARY CARE | Facility: CLINIC | Age: 60
End: 2025-01-30

## 2025-01-31 ENCOUNTER — APPOINTMENT (OUTPATIENT)
Dept: RADIOLOGY | Facility: HOSPITAL | Age: 60
End: 2025-01-31
Payer: MEDICARE

## 2025-01-31 ENCOUNTER — APPOINTMENT (OUTPATIENT)
Dept: PRIMARY CARE | Facility: CLINIC | Age: 60
End: 2025-01-31
Payer: MEDICARE

## 2025-02-04 ENCOUNTER — ANESTHESIA EVENT (OUTPATIENT)
Dept: GASTROENTEROLOGY | Facility: EXTERNAL LOCATION | Age: 60
End: 2025-02-04

## 2025-02-05 ENCOUNTER — APPOINTMENT (OUTPATIENT)
Dept: NEUROLOGY | Facility: HOSPITAL | Age: 60
End: 2025-02-05
Payer: MEDICARE

## 2025-02-08 ENCOUNTER — APPOINTMENT (OUTPATIENT)
Dept: RADIOLOGY | Facility: HOSPITAL | Age: 60
End: 2025-02-08
Payer: MEDICARE

## 2025-02-10 ENCOUNTER — TELEPHONE (OUTPATIENT)
Dept: PRIMARY CARE | Facility: CLINIC | Age: 60
End: 2025-02-10
Payer: MEDICARE

## 2025-02-10 DIAGNOSIS — Z12.11 COLON CANCER SCREENING: Primary | ICD-10-CM

## 2025-02-10 RX ORDER — SODIUM, POTASSIUM,MAG SULFATES 17.5-3.13G
SOLUTION, RECONSTITUTED, ORAL ORAL
Qty: 354 ML | Refills: 0 | Status: CANCELLED | OUTPATIENT
Start: 2025-02-10

## 2025-02-10 NOTE — TELEPHONE ENCOUNTER
Call to [facility] to order a second Suprep for the patient's 2 day prep for her colonoscopy. The pharmacist states it was misread when filled. They will contact the patient when ready. Message in my chart to the patient.

## 2025-02-17 ENCOUNTER — APPOINTMENT (OUTPATIENT)
Facility: HOSPITAL | Age: 60
End: 2025-02-17
Payer: MEDICARE

## 2025-02-18 ENCOUNTER — APPOINTMENT (OUTPATIENT)
Dept: RADIOLOGY | Facility: HOSPITAL | Age: 60
End: 2025-02-18
Payer: MEDICARE

## 2025-02-19 ENCOUNTER — APPOINTMENT (OUTPATIENT)
Dept: GASTROENTEROLOGY | Facility: EXTERNAL LOCATION | Age: 60
End: 2025-02-19
Payer: MEDICARE

## 2025-02-19 ENCOUNTER — ANESTHESIA (OUTPATIENT)
Dept: GASTROENTEROLOGY | Facility: EXTERNAL LOCATION | Age: 60
End: 2025-02-19

## 2025-02-19 VITALS
SYSTOLIC BLOOD PRESSURE: 137 MMHG | TEMPERATURE: 97.9 F | OXYGEN SATURATION: 95 % | BODY MASS INDEX: 31.02 KG/M2 | DIASTOLIC BLOOD PRESSURE: 78 MMHG | RESPIRATION RATE: 14 BRPM | HEART RATE: 71 BPM | WEIGHT: 158 LBS | HEIGHT: 60 IN

## 2025-02-19 DIAGNOSIS — R19.5 POSITIVE COLORECTAL CANCER SCREENING USING COLOGUARD TEST: ICD-10-CM

## 2025-02-19 PROCEDURE — 45385 COLONOSCOPY W/LESION REMOVAL: CPT | Performed by: INTERNAL MEDICINE

## 2025-02-19 RX ORDER — PROPOFOL 10 MG/ML
INJECTION, EMULSION INTRAVENOUS AS NEEDED
Status: DISCONTINUED | OUTPATIENT
Start: 2025-02-19 | End: 2025-02-19

## 2025-02-19 RX ORDER — LIDOCAINE HYDROCHLORIDE 20 MG/ML
INJECTION, SOLUTION INFILTRATION; PERINEURAL AS NEEDED
Status: DISCONTINUED | OUTPATIENT
Start: 2025-02-19 | End: 2025-02-19

## 2025-02-19 RX ADMIN — PROPOFOL 25 MG: 10 INJECTION, EMULSION INTRAVENOUS at 10:27

## 2025-02-19 RX ADMIN — PROPOFOL 25 MG: 10 INJECTION, EMULSION INTRAVENOUS at 10:40

## 2025-02-19 RX ADMIN — PROPOFOL 25 MG: 10 INJECTION, EMULSION INTRAVENOUS at 10:34

## 2025-02-19 RX ADMIN — PROPOFOL 100 MG: 10 INJECTION, EMULSION INTRAVENOUS at 10:24

## 2025-02-19 RX ADMIN — PROPOFOL 25 MG: 10 INJECTION, EMULSION INTRAVENOUS at 10:30

## 2025-02-19 RX ADMIN — LIDOCAINE HYDROCHLORIDE 2 ML: 20 INJECTION, SOLUTION INFILTRATION; PERINEURAL at 10:24

## 2025-02-19 RX ADMIN — PROPOFOL 25 MG: 10 INJECTION, EMULSION INTRAVENOUS at 10:36

## 2025-02-19 RX ADMIN — PROPOFOL 25 MG: 10 INJECTION, EMULSION INTRAVENOUS at 10:32

## 2025-02-19 SDOH — HEALTH STABILITY: MENTAL HEALTH: CURRENT SMOKER: 1

## 2025-02-19 ASSESSMENT — PAIN DESCRIPTION - DESCRIPTORS: DESCRIPTORS: BURNING

## 2025-02-19 ASSESSMENT — PAIN SCALES - GENERAL
PAINLEVEL_OUTOF10: 0 - NO PAIN
PAINLEVEL_OUTOF10: 0 - NO PAIN
PAIN_LEVEL: 0
PAINLEVEL_OUTOF10: 7
PAINLEVEL_OUTOF10: 0 - NO PAIN

## 2025-02-19 ASSESSMENT — PAIN - FUNCTIONAL ASSESSMENT
PAIN_FUNCTIONAL_ASSESSMENT: 0-10

## 2025-02-19 NOTE — ANESTHESIA POSTPROCEDURE EVALUATION
Patient: Rachael Ruiz    Procedure Summary       Date: 02/19/25 Room / Location: [facility]    Anesthesia Start: 1021 Anesthesia Stop: 1051    Procedure: COLONOSCOPY Diagnosis: Positive colorectal cancer screening using Cologuard test    Scheduled Providers: Jj Aguirre MD Responsible Provider: ALLA Warren    Anesthesia Type: MAC ASA Status: 3            Anesthesia Type: MAC    Vitals Value Taken Time   /78 02/19/25 1110   Temp 36.6 °C (97.9 °F) 02/19/25 1050   Pulse 71 02/19/25 1110   Resp 14 02/19/25 1110   SpO2 95 % 02/19/25 1110       Anesthesia Post Evaluation    Patient location during evaluation: bedside  Patient participation: complete - patient participated  Level of consciousness: awake and alert  Pain score: 0  Pain management: adequate  Airway patency: patent  Cardiovascular status: acceptable  Respiratory status: acceptable  Hydration status: acceptable  Postoperative Nausea and Vomiting: none        No notable events documented.

## 2025-02-19 NOTE — H&P
Procedure H&P    Patient Profile-Procedures  Name Rachael Ruiz  Date of Birth 1965  MRN [id number]  Address   [address] [id number] [facility] [id number]    Primary Phone Number [phone]  Secondary Phone Number    PCP Pineda Bauer    Procedure(s):  Procedures: Colonoscopy  Primary contact name and number   Extended Emergency Contact Information  Primary Emergency Contact: Coby Alejandre  Address: [address] [place] [address] [facility]  Home Phone: [phone]  Mobile Phone: [phone]  Relation: Child  Secondary Emergency Contact: Quang Leal  Address: [address] [place] [address] [facility]  Home Phone: [phone]  Mobile Phone: [phone]  Relation: Significant Other    General Health  Weight   Vitals:    02/19/25 1006   Weight: 71.7 kg (158 lb)     BMI Body mass index is 30.86 kg/m².    Allergies  Allergies   Allergen Reactions    Naproxen Hives    Nsaids (Non-Steroidal Anti-Inflammatory Drug) Unknown    Pregabalin Unknown    Sulfa (Sulfonamide Antibiotics) Hives    Tramadol Hives       Past Medical History   Past Medical History:   Diagnosis Date    Allergic 2017    Anemia     Anxiety 1985    Arthritis 2017    Cataract 2018    COPD (chronic obstructive pulmonary disease) (Multi) 2017    CTS (carpal tunnel syndrome) 2010?    Depression 1985    Diabetes mellitus (Multi) 1986    Headache 1986    HL (hearing loss) 1998    Hypertension 1986    Neuromuscular disorder (Multi)     Personal history of nicotine dependence 08/12/2021    Personal history of nicotine dependence    Rheumatoid arthritis     Scoliosis 2020    Spinal stenosis 2016    Substance abuse (Multi) 2015    Varicella     Visual impairment 1970       Provider assessment  Diagnosis: Positive Cologuard  Medication Reviewed - yes  Prior to Admission medications    Medication Sig Start Date End Date Taking? Authorizing Provider   albuterol 2.5 mg /3 mL (0.083 %)  nebulizer solution USE 1 VIAL VIA NEBULIZER EVERY 4 HOURS AS NEEDED FOR WHEEZING or shortness OF breath 10/8/24  Yes Pineda Bauer DO   albuterol 90 mcg/actuation inhaler INHALE 1 TO 2 PUFFS EVERY 4 TO 6 HOURS AS NEEDED FOR WHEEZING or SHORTNESS OF BREATH 11/12/24  Yes Pineda Bauer DO   ALPRAZolam (Xanax) 0.25 mg tablet take one tab every 4-5 hours, five times daily (total 1.25 mg /5 tabs daily). 1/3/25  Yes Historical Provider, MD   aspirin 81 mg EC tablet Take 1 tablet (81 mg) by mouth once daily. 8/5/19  Yes Historical Provider, MD   atorvastatin (Lipitor) 80 mg tablet Take 1 tablet (80 mg) by mouth once daily. 12/6/24  Yes Pineda Bauer DO   buprenorphine-naloxone (Suboxone) 8-2 mg SL tablet Place 1 tablet under the tongue 2 times a day.   Yes Historical Provider, MD   cholecalciferol (Vitamin D-3) 50 mcg (2,000 unit) capsule Take 1 capsule (50 mcg) by mouth once daily at bedtime. 11/25/19  Yes Historical Provider, MD   cyanocobalamin (Vitamin B-12) 1,000 mcg tablet Take 1 tablet (1,000 mcg) by mouth once daily. 12/9/24  Yes Pineda Bauer DO   docosahexaenoic acid/epa (FISH OIL ORAL) Take 1,000 mg by mouth in the morning and at bedtime.   Yes Historical Provider, MD   DULoxetine (Cymbalta) 30 mg DR capsule Take 1 capsule (30 mg) by mouth. 1/7/25  Yes Historical Provider, MD   gabapentin (Neurontin) 800 mg tablet Take 1 tablet (800 mg) by mouth 4 times a day. 10/29/24  Yes Pineda Bauer DO   hydroCHLOROthiazide (Microzide) 12.5 mg tablet Take 1 tablet (12.5 mg) by mouth once daily. 5/7/24  Yes Pineda Bauer DO   lidocaine (Lidoderm) 5 % patch Place 1 patch over 12 hours on the skin once daily. Remove & discard patch within 12 hours or as directed by MD. 4/8/24  Yes Eddie Urena PA-C   losartan (Cozaar) 100 mg tablet TAKE 1 TABLET BY MOUTH EVERY DAY 9/3/24  Yes Pineda Bauer,    metFORMIN (Glucophage) 1,000 mg tablet Take 1 tablet (1,000 mg) by mouth 2 times a day with meals. 5/7/24  Yes Pineda Bauer DO   potassium  chloride CR 20 mEq ER tablet Take 1 tablet (20 mEq) by mouth once daily. Do not crush or chew. 1/27/25  Yes Pineda Bauer DO   QUEtiapine (SEROquel) 25 mg tablet TAKE 1 TABLET BY MOUTH EVERY DAY AT BEDTIME for 30 days 3/13/24  Yes Historical Provider, MD   rOPINIRole (Requip) 1 mg tablet TAKE 2 TABLETS BY MOUTH AT BEDTIME 11/26/24  Yes Pineda Bauer DO   Spiriva Respimat 2.5 mcg/actuation inhaler inhale 2 (TWO) puffs into the lungs EVERY DAY AS DIRECTED 10/1/24  Yes Pineda Bauer DO   Trulicity 3 mg/0.5 mL pen injector Inject 3 mg under the skin 1 (one) time per week. 11/12/24  Yes Pineda Bauer DO   venlafaxine XR (Effexor-XR) 75 mg 24 hr capsule Take 1 capsule (75 mg) by mouth early in the morning.. 11/6/24  Yes Historical Provider, MD   blood sugar diagnostic (OneTouch Ultra Test) strip TEST 4 TIMES DAILY 8/21/24   Pineda Bauer DO   blood-glucose meter misc 1 kit 3 times a day. 5/7/24   Pineda Bauer, DO   nebulizers ( Nebulizer-Unvrsl Tubing) misc 1 Tube once daily as needed (Change nebulizer tubing as needed). 5/9/24   Pineda Bauer, DO   WHEELCHAIR MISC 1 Device once daily.    Historical Provider, MD   fluticasone (Flonase) 50 mcg/actuation nasal spray Administer 2 sprays into each nostril once daily. Shake gently. Before first use, prime pump. After use, clean tip and replace cap. 10/6/24 2/19/25  Mitul Silvestre MD   sodium,potassium,mag sulfates (Suprep) 17.5-3.13-1.6 gram solution Take 4 bottles by mouth 2 times a day. Two day prep 12/11/24 2/19/25  Jj Aguirre MD       Physical Exam  Vitals:    02/19/25 1006   BP: 139/72   Pulse: 77   Resp: 13   Temp: 36 °C (96.8 °F)   SpO2: 95%        General: A&Ox3, NAD.  HEENT: AT/NC.   CV: RRR. No murmur.  Resp: CTA bilaterally. No wheezing, rhonchi or rales.   GI: Soft, NT/ND. BSx4.  Extrem: No edema. Pulses intact.  Neuro: No focal deficits.   Psych: Normal mood and affect.      Procedure Plan - pre-procedural (re)assesment completed by physician:  discharge/transfer patient  when discharge criteria met    ASA status 3  Mallampati score 1    Jj Aguirre MD  2/19/2025 10:22 AM

## 2025-02-19 NOTE — DISCHARGE INSTRUCTIONS

## 2025-02-19 NOTE — ANESTHESIA PREPROCEDURE EVALUATION
Patient: Rachael Ruiz    Procedure Information       Date/Time: 02/19/25 1000    Scheduled providers: Jj Aguirre MD    Procedure: COLONOSCOPY    Location: [facility]            Relevant Problems   Anesthesia (within normal limits)      Cardiac   (+) CAD (coronary artery disease)   (+) Essential hypertension, benign   (+) Hyperlipidemia   (+) Type 2 diabetes mellitus with diabetic peripheral angiopathy without gangrene, without long-term current use of insulin (Multi)      Pulmonary   (+) Chronic obstructive pulmonary disease (Multi)      Neuro   (+) Anxiety and depression   (+) Bipolar affective disorder (Multi)   (+) Peripheral neuropathy      GI (within normal limits)      /Renal (within normal limits)      Liver (within normal limits)      Endocrine   (+) Type 2 diabetes mellitus   (+) Type 2 diabetes mellitus with diabetic peripheral angiopathy without gangrene, without long-term current use of insulin (Multi)      Hematology (within normal limits)      Musculoskeletal   (+) Chronic low back pain with sciatica   (+) Lumbar spinal stenosis      ID   (+) HSV-2 infection     Baby aspirin yesterday  Clinical information reviewed:   Tobacco  Allergies  Meds   Med Hx  Surg Hx  OB Status  Fam Hx  Soc   Hx        NPO Detail:  NPO/Void Status  Carbohydrate Drink Given Prior to Surgery? : N  Date of Last Liquid: 02/18/25  Time of Last Liquid: 2100  Date of Last Solid: 02/16/25  Time of Last Solid: 1800  Last Intake Type: Clear fluids  Time of Last Void: 1009         Physical Exam    Airway  Mallampati: I  TM distance: >3 FB  Neck ROM: full     Cardiovascular   Rhythm: regular  Rate: normal     Dental    Pulmonary - normal exam  Breath sounds clear to auscultation     Abdominal - normal exam  Abdomen: soft  Bowel sounds: normal           Anesthesia Plan    History of general anesthesia?: yes  History of complications of general anesthesia?: no    ASA 3     MAC     The patient is a current  smoker.  Patient did not smoke on day of procedure.    intravenous induction   Anesthetic plan and risks discussed with patient.

## 2025-02-21 ENCOUNTER — APPOINTMENT (OUTPATIENT)
Dept: RADIOLOGY | Facility: HOSPITAL | Age: 60
End: 2025-02-21
Payer: MEDICARE

## 2025-02-24 ENCOUNTER — TELEPHONE (OUTPATIENT)
Dept: PRIMARY CARE | Facility: CLINIC | Age: 60
End: 2025-02-24
Payer: MEDICARE

## 2025-02-24 ENCOUNTER — APPOINTMENT (OUTPATIENT)
Dept: RADIOLOGY | Facility: HOSPITAL | Age: 60
End: 2025-02-24
Payer: MEDICARE

## 2025-02-25 ENCOUNTER — OFFICE VISIT (OUTPATIENT)
Dept: PRIMARY CARE | Facility: CLINIC | Age: 60
End: 2025-02-25
Payer: MEDICARE

## 2025-02-25 VITALS
WEIGHT: 158 LBS | HEART RATE: 80 BPM | BODY MASS INDEX: 31.02 KG/M2 | SYSTOLIC BLOOD PRESSURE: 177 MMHG | TEMPERATURE: 97.8 F | DIASTOLIC BLOOD PRESSURE: 84 MMHG | RESPIRATION RATE: 18 BRPM | HEIGHT: 60 IN

## 2025-02-25 DIAGNOSIS — E11.51 TYPE 2 DIABETES MELLITUS WITH DIABETIC PERIPHERAL ANGIOPATHY WITHOUT GANGRENE, WITHOUT LONG-TERM CURRENT USE OF INSULIN (MULTI): Primary | ICD-10-CM

## 2025-02-25 DIAGNOSIS — N39.0 ACUTE UTI: ICD-10-CM

## 2025-02-25 DIAGNOSIS — Z11.3 SCREENING FOR STD (SEXUALLY TRANSMITTED DISEASE): ICD-10-CM

## 2025-02-25 LAB
POC APPEARANCE, URINE: ABNORMAL
POC BILIRUBIN, URINE: NEGATIVE
POC BLOOD, URINE: ABNORMAL
POC COLOR, URINE: YELLOW
POC GLUCOSE, URINE: NEGATIVE MG/DL
POC KETONES, URINE: NEGATIVE MG/DL
POC LEUKOCYTES, URINE: ABNORMAL
POC NITRITE,URINE: NEGATIVE
POC PH, URINE: 7.5 PH
POC PROTEIN, URINE: NEGATIVE MG/DL
POC SPECIFIC GRAVITY, URINE: 1.02
POC UROBILINOGEN, URINE: 0.2 EU/DL

## 2025-02-25 PROCEDURE — G2211 COMPLEX E/M VISIT ADD ON: HCPCS | Performed by: FAMILY MEDICINE

## 2025-02-25 PROCEDURE — 99213 OFFICE O/P EST LOW 20 MIN: CPT | Performed by: FAMILY MEDICINE

## 2025-02-25 PROCEDURE — 3008F BODY MASS INDEX DOCD: CPT | Performed by: FAMILY MEDICINE

## 2025-02-25 PROCEDURE — 4010F ACE/ARB THERAPY RXD/TAKEN: CPT | Performed by: FAMILY MEDICINE

## 2025-02-25 PROCEDURE — 3079F DIAST BP 80-89 MM HG: CPT | Performed by: FAMILY MEDICINE

## 2025-02-25 PROCEDURE — 81003 URINALYSIS AUTO W/O SCOPE: CPT | Performed by: FAMILY MEDICINE

## 2025-02-25 PROCEDURE — 3077F SYST BP >= 140 MM HG: CPT | Performed by: FAMILY MEDICINE

## 2025-02-25 RX ORDER — NITROFURANTOIN 25; 75 MG/1; MG/1
100 CAPSULE ORAL 2 TIMES DAILY
Qty: 14 CAPSULE | Refills: 0 | Status: SHIPPED | OUTPATIENT
Start: 2025-02-25 | End: 2025-03-04

## 2025-02-25 ASSESSMENT — ENCOUNTER SYMPTOMS
CHILLS: 0
BACK PAIN: 1
ABDOMINAL PAIN: 0
FLANK PAIN: 1
FEVER: 0
FREQUENCY: 1
FLANK PAIN: 0
DIARRHEA: 0
CONSTIPATION: 1
ANOREXIA: 1
DYSURIA: 0

## 2025-02-25 NOTE — PROGRESS NOTES
Subjective   Patient ID: Rachael Ruiz is a 59 y.o. female who presents for UTI.    Female  Problem  The patient's primary symptoms include a genital odor. The patient's pertinent negatives include no genital itching, genital lesions, genital rash, missed menses, pelvic pain or vaginal bleeding. This is a recurrent problem. The current episode started in the past 7 days. The problem occurs daily. The problem has been gradually worsening. The patient is experiencing no pain. The problem affects both sides. She is not pregnant. Associated symptoms include anorexia, back pain, constipation, frequency and urgency. Pertinent negatives include no abdominal pain, chills, diarrhea, discolored urine, dysuria, fever or flank pain. The vaginal discharge was clear, milky, thin and white. She has not been passing clots. She has not been passing tissue. Nothing aggravates the symptoms. She is not sexually active. No, her partner does not have an STD. She uses abstinence and hysterectomy for contraception. She is postmenopausal.      She is here today for possible UTI  She has had a 4-day history of urinary frequency and urgency.  No dysuria or fever.  No abdominal pain, flank pain or vomiting  She does mention that one of her specialists had diagnosed her with trichomonas 1 month ago and she was treated with an antibiotic for this (does not know specifically which one).  She has not been sexually active in 6 years        Review of Systems   Constitutional:  Negative for chills and fever.   Gastrointestinal:  Positive for anorexia and constipation. Negative for abdominal pain and diarrhea.   Genitourinary:  Positive for frequency and urgency. Negative for dysuria, flank pain, missed menses and pelvic pain.   Musculoskeletal:  Positive for back pain.       Objective   /84   Pulse 80   Temp 36.6 °C (97.8 °F)   Resp 18   Ht 1.524 m (5')   Wt 71.7 kg (158 lb)   BMI 30.86 kg/m²     Physical Exam  Vitals reviewed.    Constitutional:       General: She is not in acute distress.     Appearance: Normal appearance. She is well-developed.   HENT:      Head: Normocephalic.   Eyes:      Conjunctiva/sclera: Conjunctivae normal.   Cardiovascular:      Rate and Rhythm: Normal rate and regular rhythm.      Heart sounds: Normal heart sounds.   Pulmonary:      Effort: Pulmonary effort is normal.      Breath sounds: Normal breath sounds.   Abdominal:      Palpations: Abdomen is soft.      Tenderness: There is no abdominal tenderness. There is no right CVA tenderness or left CVA tenderness.   Skin:     Findings: No rash.   Neurological:      Mental Status: She is alert.   Psychiatric:         Mood and Affect: Mood normal.         Behavior: Behavior normal.         Assessment/Plan   Assessment & Plan  Acute UTI    Orders:    POCT UA Automated manually resulted    Albumin-Creatinine Ratio, Urine Random    Magnesium; Future    Vitamin B12; Future    Lipid Panel; Future    Comprehensive Metabolic Panel; Future    CBC and Auto Differential; Future    Hemoglobin A1C; Future    Urine Culture; Future    nitrofurantoin, macrocrystal-monohydrate, (Macrobid) 100 mg capsule; Take 1 capsule (100 mg) by mouth 2 times a day for 7 days.    Type 2 diabetes mellitus with diabetic peripheral angiopathy without gangrene, without long-term current use of insulin (Multi)    Orders:    Albumin-Creatinine Ratio, Urine Random    Magnesium; Future    Vitamin B12; Future    Lipid Panel; Future    Comprehensive Metabolic Panel; Future    CBC and Auto Differential; Future    Hemoglobin A1C; Future    Screening for STD (sexually transmitted disease)    Orders:    Trichomonas vaginalis, Amplified    Her UA today is positive for moderate leukocytes and indicates a possible UTI.  We will send urine for culture and start treatment with nitrofurantoin  Regarding her recent trichomonas diagnosis by one of her specialists-she reports that she was treated 1 month ago.  We will  repeat this to confirm that this infection has resolved  She is scheduled to see me for a full follow-up in approximately 2 weeks.  I put an order in the chart for labs including A1c for her to get done prior to her upcoming appointment  Upcoming appointment  Her blood pressure today is elevated at 177/84.  This had most recently been 137/78 when checked 6 days ago.  Will plan on rechecking her blood pressure at follow-up

## 2025-02-25 NOTE — TELEPHONE ENCOUNTER
"I received multiple voicemails from pt in regards to her UTI. \"She knows she has one \"   And she is \"Peeing all the time\"    I know there's a few messages on this, I believe that part was addressed,   however she also states nobody  has reached out to her for Ophthalmology.  There is something really wrong with her R eye  Please Advise   "

## 2025-02-25 NOTE — TELEPHONE ENCOUNTER
Patient called again re: UTI Symptoms  She is wondering if she can be seen virtually today or if antibiotic can be sent in    Please Advise    [phone]

## 2025-02-25 NOTE — ASSESSMENT & PLAN NOTE
Orders:    Albumin-Creatinine Ratio, Urine Random    Magnesium; Future    Vitamin B12; Future    Lipid Panel; Future    Comprehensive Metabolic Panel; Future    CBC and Auto Differential; Future    Hemoglobin A1C; Future

## 2025-02-26 ENCOUNTER — HOSPITAL ENCOUNTER (OUTPATIENT)
Dept: NEUROLOGY | Facility: HOSPITAL | Age: 60
End: 2025-02-26
Payer: MEDICARE

## 2025-02-27 DIAGNOSIS — E11.42 TYPE 2 DIABETES MELLITUS WITH DIABETIC POLYNEUROPATHY, WITHOUT LONG-TERM CURRENT USE OF INSULIN: ICD-10-CM

## 2025-02-27 LAB
ALBUMIN/CREAT UR: 19 MG/G CREAT
BACTERIA UR CULT: NORMAL
CREAT UR-MCNC: 112 MG/DL (ref 20–275)
LABORATORY COMMENT REPORT: NORMAL
MICROALBUMIN UR-MCNC: 2.1 MG/DL
PATH REPORT.FINAL DX SPEC: NORMAL
PATH REPORT.GROSS SPEC: NORMAL
PATH REPORT.RELEVANT HX SPEC: NORMAL
PATH REPORT.TOTAL CANCER: NORMAL
T VAGINALIS RRNA SPEC QL NAA+PROBE: NOT DETECTED

## 2025-02-27 RX ORDER — GABAPENTIN 800 MG/1
800 TABLET ORAL 4 TIMES DAILY
Qty: 120 TABLET | Refills: 3 | Status: SHIPPED | OUTPATIENT
Start: 2025-02-27

## 2025-02-27 NOTE — TELEPHONE ENCOUNTER
Pt Needs ASAP due to heading to the pharm soon.      Rx Refill Request Telephone Encounter    Name:  Rachael Ruiz  :  [id number]  Medication Name:  gabapentin (Neurontin) 800 mg tablet     Specific Pharmacy location:    [facility] [address] [place] [address]  Phone: [phone]  Fax: [phone]  JOEY #: --

## 2025-03-04 ENCOUNTER — APPOINTMENT (OUTPATIENT)
Dept: RADIOLOGY | Facility: HOSPITAL | Age: 60
End: 2025-03-04
Payer: MEDICARE

## 2025-03-04 ENCOUNTER — TELEPHONE (OUTPATIENT)
Dept: PRIMARY CARE | Facility: CLINIC | Age: 60
End: 2025-03-04
Payer: MEDICARE

## 2025-03-04 NOTE — PATIENT INSTRUCTIONS
March 4, 2025    Dear Ms. Ruiz,    We would like to invite you to participate in our Chronic Care Management program with the goal of improving your health. We will match you up with a care manager who will work with you to keep your chronic conditions under control. Please call your clinic or log on to [facility] if you have any questions or would like to enroll.    We look forward to working toward a healthier you together.    Sincerely,        CC: [unfilled]

## 2025-03-04 NOTE — TELEPHONE ENCOUNTER
Pt called about lifting recline chair that was denied. States she can in fact walk with a can once she up in the air. How would you like to go about this? Please advice.

## 2025-03-07 ENCOUNTER — HOSPITAL ENCOUNTER (OUTPATIENT)
Dept: RADIOLOGY | Facility: HOSPITAL | Age: 60
Discharge: HOME | End: 2025-03-07
Payer: MEDICARE

## 2025-03-07 VITALS — HEIGHT: 61 IN | WEIGHT: 158 LBS | BODY MASS INDEX: 29.83 KG/M2

## 2025-03-07 DIAGNOSIS — Z12.31 BREAST CANCER SCREENING BY MAMMOGRAM: ICD-10-CM

## 2025-03-07 DIAGNOSIS — Z87.891 PERSONAL HISTORY OF NICOTINE DEPENDENCE: ICD-10-CM

## 2025-03-07 PROCEDURE — 77067 SCR MAMMO BI INCL CAD: CPT

## 2025-03-07 PROCEDURE — 71271 CT THORAX LUNG CANCER SCR C-: CPT

## 2025-03-11 ENCOUNTER — APPOINTMENT (OUTPATIENT)
Dept: PRIMARY CARE | Facility: CLINIC | Age: 60
End: 2025-03-11
Payer: MEDICARE

## 2025-03-11 ASSESSMENT — ENCOUNTER SYMPTOMS
HEADACHES: 0
POLYDIPSIA: 1
BLURRED VISION: 1
TREMORS: 0
POLYPHAGIA: 1
BLACKOUTS: 0
SWEATS: 0
FATIGUE: 0
NERVOUS/ANXIOUS: 0
SEIZURES: 0
WEIGHT LOSS: 0
SPEECH DIFFICULTY: 0
HUNGER: 0
WEAKNESS: 0
DIZZINESS: 0
CONFUSION: 0
VISUAL CHANGE: 1

## 2025-03-16 LAB
ALBUMIN SERPL-MCNC: 4.3 G/DL (ref 3.6–5.1)
ALP SERPL-CCNC: 84 U/L (ref 37–153)
ALT SERPL-CCNC: 21 U/L (ref 6–29)
ANION GAP SERPL CALCULATED.4IONS-SCNC: 9 MMOL/L (CALC) (ref 7–17)
AST SERPL-CCNC: 21 U/L (ref 10–35)
BACTERIA UR CULT: ABNORMAL
BASOPHILS # BLD AUTO: 59 CELLS/UL (ref 0–200)
BASOPHILS NFR BLD AUTO: 0.6 %
BILIRUB SERPL-MCNC: 0.4 MG/DL (ref 0.2–1.2)
BUN SERPL-MCNC: 15 MG/DL (ref 7–25)
CALCIUM SERPL-MCNC: 9.8 MG/DL (ref 8.6–10.4)
CHLORIDE SERPL-SCNC: 103 MMOL/L (ref 98–110)
CHOLEST SERPL-MCNC: 185 MG/DL
CHOLEST/HDLC SERPL: 5.4 (CALC)
CO2 SERPL-SCNC: 26 MMOL/L (ref 20–32)
CREAT SERPL-MCNC: 0.93 MG/DL (ref 0.5–1.05)
EGFRCR SERPLBLD CKD-EPI 2021: 70 ML/MIN/1.73M2
EOSINOPHIL # BLD AUTO: 480 CELLS/UL (ref 15–500)
EOSINOPHIL NFR BLD AUTO: 4.9 %
ERYTHROCYTE [DISTWIDTH] IN BLOOD BY AUTOMATED COUNT: 13.2 % (ref 11–15)
EST. AVERAGE GLUCOSE BLD GHB EST-MCNC: 137 MG/DL
EST. AVERAGE GLUCOSE BLD GHB EST-SCNC: 7.6 MMOL/L
GLUCOSE SERPL-MCNC: 79 MG/DL (ref 65–99)
HBA1C MFR BLD: 6.4 % OF TOTAL HGB
HCT VFR BLD AUTO: 36.5 % (ref 35–45)
HDLC SERPL-MCNC: 34 MG/DL
HGB BLD-MCNC: 12.3 G/DL (ref 11.7–15.5)
LDLC SERPL CALC-MCNC: 110 MG/DL (CALC)
LYMPHOCYTES # BLD AUTO: 3028 CELLS/UL (ref 850–3900)
LYMPHOCYTES NFR BLD AUTO: 30.9 %
MAGNESIUM SERPL-MCNC: 1.6 MG/DL (ref 1.5–2.5)
MCH RBC QN AUTO: 32.7 PG (ref 27–33)
MCHC RBC AUTO-ENTMCNC: 33.7 G/DL (ref 32–36)
MCV RBC AUTO: 97.1 FL (ref 80–100)
MONOCYTES # BLD AUTO: 774 CELLS/UL (ref 200–950)
MONOCYTES NFR BLD AUTO: 7.9 %
NEUTROPHILS # BLD AUTO: 5459 CELLS/UL (ref 1500–7800)
NEUTROPHILS NFR BLD AUTO: 55.7 %
NONHDLC SERPL-MCNC: 151 MG/DL (CALC)
PLATELET # BLD AUTO: 321 THOUSAND/UL (ref 140–400)
PMV BLD REES-ECKER: 9.6 FL (ref 7.5–12.5)
POTASSIUM SERPL-SCNC: 4.2 MMOL/L (ref 3.5–5.3)
PROT SERPL-MCNC: 7 G/DL (ref 6.1–8.1)
RBC # BLD AUTO: 3.76 MILLION/UL (ref 3.8–5.1)
SODIUM SERPL-SCNC: 138 MMOL/L (ref 135–146)
TRIGL SERPL-MCNC: 311 MG/DL
VIT B12 SERPL-MCNC: >2000 PG/ML (ref 200–1100)
WBC # BLD AUTO: 9.8 THOUSAND/UL (ref 3.8–10.8)

## 2025-03-17 ENCOUNTER — APPOINTMENT (OUTPATIENT)
Facility: HOSPITAL | Age: 60
End: 2025-03-17
Payer: MEDICARE

## 2025-03-17 ENCOUNTER — TELEPHONE (OUTPATIENT)
Dept: PRIMARY CARE | Facility: CLINIC | Age: 60
End: 2025-03-17
Payer: MEDICARE

## 2025-03-17 DIAGNOSIS — N39.0 URINARY TRACT INFECTION WITHOUT HEMATURIA, SITE UNSPECIFIED: Primary | ICD-10-CM

## 2025-03-17 RX ORDER — AMOXICILLIN 875 MG/1
875 TABLET, FILM COATED ORAL 2 TIMES DAILY
Qty: 10 TABLET | Refills: 0 | Status: SHIPPED | OUTPATIENT
Start: 2025-03-17 | End: 2025-03-22

## 2025-03-19 ENCOUNTER — TELEPHONE (OUTPATIENT)
Dept: PRIMARY CARE | Facility: CLINIC | Age: 60
End: 2025-03-19

## 2025-03-19 ENCOUNTER — APPOINTMENT (OUTPATIENT)
Dept: PRIMARY CARE | Facility: CLINIC | Age: 60
End: 2025-03-19
Payer: MEDICARE

## 2025-03-19 VITALS — DIASTOLIC BLOOD PRESSURE: 68 MMHG | SYSTOLIC BLOOD PRESSURE: 100 MMHG

## 2025-03-19 DIAGNOSIS — I10 ESSENTIAL HYPERTENSION, BENIGN: Primary | ICD-10-CM

## 2025-03-19 DIAGNOSIS — E11.51 TYPE 2 DIABETES MELLITUS WITH DIABETIC PERIPHERAL ANGIOPATHY WITHOUT GANGRENE, WITHOUT LONG-TERM CURRENT USE OF INSULIN (MULTI): ICD-10-CM

## 2025-03-19 DIAGNOSIS — E78.49 OTHER HYPERLIPIDEMIA: ICD-10-CM

## 2025-03-19 PROCEDURE — 3074F SYST BP LT 130 MM HG: CPT | Performed by: FAMILY MEDICINE

## 2025-03-19 PROCEDURE — 99214 OFFICE O/P EST MOD 30 MIN: CPT | Performed by: FAMILY MEDICINE

## 2025-03-19 PROCEDURE — 3078F DIAST BP <80 MM HG: CPT | Performed by: FAMILY MEDICINE

## 2025-03-19 PROCEDURE — 4010F ACE/ARB THERAPY RXD/TAKEN: CPT | Performed by: FAMILY MEDICINE

## 2025-03-19 RX ORDER — ALPRAZOLAM 0.5 MG/1
TABLET ORAL
COMMUNITY
Start: 2025-02-27

## 2025-03-19 ASSESSMENT — ENCOUNTER SYMPTOMS
TREMORS: 0
SWEATS: 0
VISUAL CHANGE: 1
SEIZURES: 0
WEIGHT LOSS: 0
CONFUSION: 0
HEADACHES: 0
SPEECH DIFFICULTY: 0
NERVOUS/ANXIOUS: 0
FATIGUE: 0
POLYDIPSIA: 1
DIZZINESS: 0
BLURRED VISION: 1
BLACKOUTS: 0
WEAKNESS: 0
HUNGER: 0
POLYPHAGIA: 1

## 2025-03-19 NOTE — PROGRESS NOTES
Subjective   Patient ID: Rachael Ruiz is a 59 y.o. female who presents for Follow-up.    Diabetes  She has type 2 diabetes mellitus. No MedicAlert identification noted. The initial diagnosis of diabetes was made 42 years ago. Pertinent negatives for hypoglycemia include no confusion, dizziness, headaches, hunger, mood changes, nervousness/anxiousness, pallor, seizures, sleepiness, speech difficulty, sweats or tremors. Associated symptoms include blurred vision, foot paresthesias, polydipsia, polyphagia, polyuria and visual change. Pertinent negatives for diabetes include no chest pain, no fatigue, no foot ulcerations, no weakness and no weight loss. Pertinent negatives for hypoglycemia complications include no blackouts, no hospitalization, no nocturnal hypoglycemia, no required assistance and no required glucagon injection. Symptoms are stable. Diabetic complications include peripheral neuropathy and retinopathy. Pertinent negatives for diabetic complications include no CVA, heart disease, impotence, nephropathy or PVD. Risk factors for coronary artery disease include dyslipidemia, hypertension, obesity, post-menopausal, sedentary lifestyle, stress and tobacco exposure. Current diabetic treatment includes oral agent (dual therapy). She is compliant with treatment none of the time. Her weight is stable. She is following a high fat/cholesterol diet. When asked about meal planning, she reported none. She has not had a previous visit with a dietitian. She never participates in exercise. She monitors blood glucose at home 3-4 x per day. She monitors urine at home <1 x per month. Blood glucose monitoring compliance is adequate. There is no change in her home blood glucose trend. Her breakfast blood glucose is taken after 10 am. Her breakfast blood glucose range is generally  mg/dl. Her lunch blood glucose is taken after 3 pm. Her lunch blood glucose range is generally 110-130 mg/dl. Her dinner blood glucose is  taken between 7-8 pm. Her dinner blood glucose range is generally 110-130 mg/dl. Her bedtime blood glucose is taken after 11 pm. Her bedtime blood glucose range is generally 110-130 mg/dl. Her overall blood glucose range is 110-130 mg/dl. She sees a podiatrist.Eye exam is current.        She is here today for 3-month follow-up  Diabetes mellitus: Currently takes Trulicity 3 mg weekly metformin 1000 mg twice daily.  Checks glucose out of the office and it averages between 122 and 125  She is taking gabapentin 800 mg 4 times per day for diabetic neuropathy.  This medication was previously started by neurology.  She is scheduled to establish with a new neurologist next month.  She reports numbness involving both legs to the level of the knee but denies any pain or paresthesias in her feet.  Denies any adverse effects with gabapentin  Hypertension: Currently takes losartan 100 mg daily and HCTZ 12.5 mg daily.  She has been checking her blood pressure out of the office and states readings have been at goal, most recent 100/68  Hyperlipidemia: She currently takes atorvastatin 80 mg daily.  Compliant with taking  She takes p.o. vitamin B12 1000 mcg daily for B12 deficiency  We reviewed her most recent labs from 3/14/2025:  A1c: 6.4%  CBC/CMP: Unremarkable  Lipid panel: .  Triglycerides 311  B12: Normal range      Review of Systems   Constitutional:  Negative for fatigue and weight loss.   Eyes:  Positive for blurred vision.   Cardiovascular:  Negative for chest pain.   Endocrine: Positive for polydipsia, polyphagia and polyuria.   Genitourinary:  Negative for impotence.   Skin:  Negative for pallor.   Neurological:  Negative for dizziness, tremors, seizures, speech difficulty, weakness and headaches.   Psychiatric/Behavioral:  Negative for confusion. The patient is not nervous/anxious.        Objective   /68 Comment: Home BP    Physical Exam  Constitutional:       General: She is not in acute distress.      Appearance: Normal appearance. She is well-developed.   Pulmonary:      Effort: Pulmonary effort is normal.   Skin:     Findings: No rash.   Neurological:      Mental Status: She is alert.   Psychiatric:         Mood and Affect: Mood normal.         Behavior: Behavior normal.         Assessment/Plan   Assessment & Plan  Essential hypertension, benign    Orders:    Lipid Panel; Future    Type 2 diabetes mellitus with diabetic peripheral angiopathy without gangrene, without long-term current use of insulin (Multi)    Orders:    Lipid Panel; Future    Other hyperlipidemia    Orders:    Lipid Panel; Future    Diabetes mellitus: We reviewed her most recent labs which show that A1c is at goal at 6.4%.  Continue metformin and Trulicity at current dose and follow-up in 3 months for recheck  Continue gabapentin for diabetic neuropathy.  She is going to be establishing with new neurologist next month  Hypertension: this is controlled with blood pressure 100/68.  Continue losartan and HCTZ  Continue p.o. vitamin B12 for vitamin B12 deficiency.  Last B12 level was normal range  Hyperlipidemia: Her last lipid panel was suboptimal with LDL of 110 and triglycerides 311.  She states she has been compliant with taking atorvastatin 80 mg daily.  She would like to try to work on lifestyle modification.  We discussed avoiding saturated fats in diet and we will recheck a lipid panel prior to her follow-up in 3 months.  Consider adding ezetimibe if lipid panel has not improved  Follow-up in 3 months for recheck

## 2025-03-21 ENCOUNTER — TELEPHONE (OUTPATIENT)
Dept: PRIMARY CARE | Facility: CLINIC | Age: 60
End: 2025-03-21
Payer: MEDICARE

## 2025-03-21 DIAGNOSIS — Z20.822 EXPOSURE TO CONFIRMED CASE OF COVID-19: ICD-10-CM

## 2025-03-21 RX ORDER — COVID-19 ANTIGEN TEST
1 KIT MISCELLANEOUS ONCE
COMMUNITY
End: 2025-03-21 | Stop reason: SDUPTHER

## 2025-03-21 RX ORDER — COVID-19 ANTIGEN TEST
1 KIT MISCELLANEOUS ONCE
Qty: 1 KIT | Refills: 0 | Status: SHIPPED | OUTPATIENT
Start: 2025-03-21 | End: 2025-03-21

## 2025-03-21 NOTE — TELEPHONE ENCOUNTER
Pt states daughter tested positive for covid. Wants to know if you can order a test to the pharmacy so she can check herself. Please advise.

## 2025-03-24 ENCOUNTER — TELEPHONE (OUTPATIENT)
Dept: PRIMARY CARE | Facility: CLINIC | Age: 60
End: 2025-03-24
Payer: MEDICARE

## 2025-03-24 DIAGNOSIS — L30.4 INTERTRIGO: Primary | ICD-10-CM

## 2025-03-24 RX ORDER — NYSTATIN 100000 U/G
1 CREAM TOPICAL 2 TIMES DAILY
COMMUNITY
End: 2025-03-24 | Stop reason: SDUPTHER

## 2025-03-24 RX ORDER — NYSTATIN 100000 U/G
1 CREAM TOPICAL 2 TIMES DAILY
Qty: 30 G | Refills: 3 | Status: SHIPPED | OUTPATIENT
Start: 2025-03-24

## 2025-03-25 ENCOUNTER — APPOINTMENT (OUTPATIENT)
Dept: NEUROLOGY | Facility: HOSPITAL | Age: 60
End: 2025-03-25
Payer: MEDICARE

## 2025-03-26 ENCOUNTER — TELEPHONE (OUTPATIENT)
Dept: PRIMARY CARE | Facility: CLINIC | Age: 60
End: 2025-03-26
Payer: MEDICARE

## 2025-03-26 NOTE — TELEPHONE ENCOUNTER
Patient called back about constipation issues  She is very concerned    Please Advise      [phone]

## 2025-03-27 ENCOUNTER — APPOINTMENT (OUTPATIENT)
Dept: PRIMARY CARE | Facility: CLINIC | Age: 60
End: 2025-03-27
Payer: MEDICARE

## 2025-04-01 ENCOUNTER — APPOINTMENT (OUTPATIENT)
Facility: CLINIC | Age: 60
End: 2025-04-01
Payer: MEDICARE

## 2025-04-01 ENCOUNTER — TELEPHONE (OUTPATIENT)
Dept: PRIMARY CARE | Facility: CLINIC | Age: 60
End: 2025-04-01
Payer: MEDICARE

## 2025-04-01 DIAGNOSIS — Z72.0 TOBACCO USE: Primary | ICD-10-CM

## 2025-04-01 RX ORDER — IBUPROFEN 200 MG
1 TABLET ORAL EVERY 24 HOURS
Qty: 14 PATCH | Refills: 0 | Status: SHIPPED | OUTPATIENT
Start: 2025-04-01

## 2025-04-01 RX ORDER — IBUPROFEN 200 MG
1 TABLET ORAL EVERY 24 HOURS
Qty: 42 PATCH | Refills: 0 | Status: SHIPPED | OUTPATIENT
Start: 2025-04-01

## 2025-04-01 RX ORDER — NICOTINE 7MG/24HR
1 PATCH, TRANSDERMAL 24 HOURS TRANSDERMAL EVERY 24 HOURS
Qty: 14 PATCH | Refills: 0 | Status: SHIPPED | OUTPATIENT
Start: 2025-04-01

## 2025-04-02 ENCOUNTER — APPOINTMENT (OUTPATIENT)
Dept: NEUROLOGY | Facility: CLINIC | Age: 60
End: 2025-04-02
Payer: MEDICARE

## 2025-04-07 ENCOUNTER — TELEPHONE (OUTPATIENT)
Dept: PRIMARY CARE | Facility: CLINIC | Age: 60
End: 2025-04-07
Payer: MEDICARE

## 2025-04-07 DIAGNOSIS — Z00.00 ROUTINE HEALTH MAINTENANCE: Primary | ICD-10-CM

## 2025-04-07 NOTE — TELEPHONE ENCOUNTER
Pt was inquiring about Measles. Unsure if this is needed or is she already vaccinated. Please advise.

## 2025-04-17 ENCOUNTER — APPOINTMENT (OUTPATIENT)
Dept: NEUROLOGY | Facility: CLINIC | Age: 60
End: 2025-04-17
Payer: MEDICARE

## 2025-04-18 ENCOUNTER — TELEPHONE (OUTPATIENT)
Dept: PRIMARY CARE | Facility: CLINIC | Age: 60
End: 2025-04-18

## 2025-04-18 ENCOUNTER — TELEMEDICINE (OUTPATIENT)
Dept: PRIMARY CARE | Facility: CLINIC | Age: 60
End: 2025-04-18
Payer: MEDICARE

## 2025-04-18 DIAGNOSIS — R39.9 UTI SYMPTOMS: Primary | ICD-10-CM

## 2025-04-18 PROCEDURE — G2211 COMPLEX E/M VISIT ADD ON: HCPCS | Performed by: FAMILY MEDICINE

## 2025-04-18 PROCEDURE — 99213 OFFICE O/P EST LOW 20 MIN: CPT | Performed by: FAMILY MEDICINE

## 2025-04-18 PROCEDURE — 4010F ACE/ARB THERAPY RXD/TAKEN: CPT | Performed by: FAMILY MEDICINE

## 2025-04-18 RX ORDER — DULOXETIN HYDROCHLORIDE 20 MG/1
20 CAPSULE, DELAYED RELEASE ORAL DAILY
COMMUNITY
Start: 2025-04-14

## 2025-04-18 RX ORDER — VENLAFAXINE HYDROCHLORIDE 150 MG/1
150 CAPSULE, EXTENDED RELEASE ORAL DAILY
COMMUNITY
Start: 2025-04-14

## 2025-04-18 RX ORDER — NITROFURANTOIN 25; 75 MG/1; MG/1
100 CAPSULE ORAL 2 TIMES DAILY
Qty: 10 CAPSULE | Refills: 0 | Status: SHIPPED | OUTPATIENT
Start: 2025-04-18 | End: 2025-04-23

## 2025-04-18 ASSESSMENT — ENCOUNTER SYMPTOMS
VOMITING: 0
FREQUENCY: 1
DYSURIA: 0
BACK PAIN: 1

## 2025-04-18 NOTE — PROGRESS NOTES
Subjective   Patient ID: Rachael Ruiz is a 59 y.o. female who presents for UTI.    UTI   This is a new problem. Episode onset: 3-4 days. Associated symptoms include frequency and urgency. Pertinent negatives include no vomiting. Associated symptoms comments: Abd pressure .        Virtual or Telephone Consent    An interactive audio and video telecommunication system which permits real time communications between the patient (at the originating site) and provider (at the distant site) was utilized to provide this telehealth service.   Verbal consent was requested and obtained from Rachael Ruiz on this date, 04/18/25 for a telehealth visit and the patient's location was confirmed at the time of the visit.      She is concerned about a possible UTI.  Over the past 4 days she has had urinary symptoms including urgency and frequency.  She did feel warm yesterday but did not take her temperature.  No abdominal pain or vomiting  She previously had a UTI approximately 2 months ago with similar symptoms      Review of Systems   Gastrointestinal:  Negative for vomiting.   Genitourinary:  Positive for frequency and urgency. Negative for dysuria.   Musculoskeletal:  Positive for back pain.       Objective   There were no vitals taken for this visit.    Physical Exam  Constitutional:       General: She is not in acute distress.     Appearance: Normal appearance. She is well-developed.   Pulmonary:      Effort: Pulmonary effort is normal.   Skin:     Findings: No rash.   Neurological:      Mental Status: She is alert.   Psychiatric:         Mood and Affect: Mood normal.         Behavior: Behavior normal.         Assessment/Plan   Assessment & Plan  UTI symptoms    Orders:    nitrofurantoin, macrocrystal-monohydrate, (Macrobid) 100 mg capsule; Take 1 capsule (100 mg) by mouth 2 times a day for 5 days.    She presents today with a 4-day history of urinary symptoms including urinary urgency and frequency, with a history of  similar symptoms in the past with UTI.  Today's visit was done virtually, so I was not able to obtain a urine sample on her.  She does not currently have transportation, so she is not able to come to the office or go to an outpatient lab to give a urine sample.  She is requesting an antibiotic.  I will get her started on Macrobid 100 mg daily for 5 days, but I emphasized that if her symptoms are not improving by Monday, I would need to either see her in the office, or send her to the lab to obtain a UA and urine culture.  Advise ER if any severe or worsening symptoms.  She will call if not improving in the next 3 to 4 days

## 2025-04-18 NOTE — TELEPHONE ENCOUNTER
Pt called in and stated she wont be able to  a medication that she spoke to you about until after 7pm and she also states she left you a VM

## 2025-04-23 ENCOUNTER — TELEPHONE (OUTPATIENT)
Dept: PRIMARY CARE | Facility: CLINIC | Age: 60
End: 2025-04-23
Payer: MEDICARE

## 2025-04-23 DIAGNOSIS — R39.9 UTI SYMPTOMS: Primary | ICD-10-CM

## 2025-04-23 NOTE — TELEPHONE ENCOUNTER
Pt states she has constipation. Was wondering what can she do about this.     Also requesting a new UTI order to be put in so she can have this done at the lab to see if UTI cleared. She also mentioned it feels like she's going to the bathroom every 5 minutes. Please advise.

## 2025-04-25 ENCOUNTER — TELEMEDICINE (OUTPATIENT)
Dept: PRIMARY CARE | Facility: CLINIC | Age: 60
End: 2025-04-25
Payer: MEDICARE

## 2025-04-25 DIAGNOSIS — K59.00 CONSTIPATION, UNSPECIFIED CONSTIPATION TYPE: Primary | ICD-10-CM

## 2025-04-25 PROCEDURE — 4010F ACE/ARB THERAPY RXD/TAKEN: CPT | Performed by: FAMILY MEDICINE

## 2025-04-25 PROCEDURE — G2211 COMPLEX E/M VISIT ADD ON: HCPCS | Performed by: FAMILY MEDICINE

## 2025-04-25 PROCEDURE — 99213 OFFICE O/P EST LOW 20 MIN: CPT | Performed by: FAMILY MEDICINE

## 2025-04-25 ASSESSMENT — ENCOUNTER SYMPTOMS
FEVER: 0
ABDOMINAL PAIN: 0
FREQUENCY: 0
DYSURIA: 0
CONSTIPATION: 1

## 2025-04-25 NOTE — PROGRESS NOTES
Subjective   Patient ID: Rachael Ruiz is a 59 y.o. female who presents for UTI and Constipation.    Virtual or Telephone Consent    While technically available, the patient was unable or unwilling to consent to connect via audio/video telehealth technology; therefore, I performed this visit using a real-time audio only connection between Rachael Ruiz & Pineda Bauer DO.  Verbal consent was requested and obtained from Rachael Ruiz on this date, 04/25/25 for a telehealth visit and the patient's location was confirmed at the time of the visit.    UTI   This is a recurrent problem. The problem has been resolved. Pertinent negatives include no frequency or urgency.   Constipation  Pertinent negatives include no abdominal pain or fever. Treatments tried: miralax.      She would like to discuss constipation  She has a history of chronic constipation and typically will have a bowel movement every few days.  She has had worsening constipation over the past 6 days.  Last bowel movement was yesterday, but it was only a small amount.  Stools been hard and shraddha  She is up-to-date on colonoscopy.  Last colonoscopy was done in February of this year, 3-year repeat recommended  No abdominal pain  She has tried MiraLAX 2 times without improvement  We also did briefly follow-up on UTI symptoms.  I saw her 1 week ago with a 4-day history of urinary frequency and urgency.  This was virtual so we were not able to get a urine on her.  She was treated with nitrofurantoin.  She states that her symptoms have resolved, however she did go to the lab this morning and gave a sample for urine culture.  Results are pending        Review of Systems   Constitutional:  Negative for fever.   Gastrointestinal:  Positive for constipation. Negative for abdominal pain.   Genitourinary:  Negative for dysuria, frequency and urgency.       Objective   There were no vitals taken for this visit.    Physical Exam  Constitutional:       General: She  is not in acute distress.     Appearance: Normal appearance. She is well-developed.   Pulmonary:      Effort: Pulmonary effort is normal.   Skin:     Findings: No rash.   Neurological:      Mental Status: She is alert.   Psychiatric:         Mood and Affect: Mood normal.         Behavior: Behavior normal.         Assessment/Plan   Assessment & Plan  Constipation, unspecified constipation type         We discussed the importance of adequate fluid intake and recommended continuing MiraLAX daily as needed.  I recommended that she try glycerin suppositories and follow-up or call next week if constipation is not improving

## 2025-04-27 LAB — BACTERIA UR CULT: NORMAL

## 2025-04-28 DIAGNOSIS — M54.12 CERVICAL RADICULOPATHY: Primary | ICD-10-CM

## 2025-04-28 DIAGNOSIS — G56.03 BILATERAL CARPAL TUNNEL SYNDROME: ICD-10-CM

## 2025-04-28 LAB — MEV IGG SER IA-ACNC: >300 AU/ML

## 2025-05-03 DIAGNOSIS — E87.6 HYPOKALEMIA: ICD-10-CM

## 2025-05-05 RX ORDER — POTASSIUM CHLORIDE 20 MEQ/1
20 TABLET, EXTENDED RELEASE ORAL DAILY
Qty: 30 TABLET | Refills: 2 | Status: SHIPPED | OUTPATIENT
Start: 2025-05-05

## 2025-05-07 ENCOUNTER — TELEPHONE (OUTPATIENT)
Dept: PRIMARY CARE | Facility: CLINIC | Age: 60
End: 2025-05-07
Payer: MEDICARE

## 2025-05-07 DIAGNOSIS — D22.9 NEVUS: Primary | ICD-10-CM

## 2025-05-15 ENCOUNTER — TELEPHONE (OUTPATIENT)
Dept: PRIMARY CARE | Facility: CLINIC | Age: 60
End: 2025-05-15
Payer: MEDICARE

## 2025-05-15 NOTE — TELEPHONE ENCOUNTER
Pt has UTI symptoms about 2 days now. Drinking cranberry juice. A lot of sweating. Daughter called for her and is very concerned. Please advise.     [phone] Emre dalton

## 2025-05-16 ENCOUNTER — APPOINTMENT (OUTPATIENT)
Dept: PRIMARY CARE | Facility: CLINIC | Age: 60
End: 2025-05-16
Payer: MEDICARE

## 2025-05-19 ENCOUNTER — APPOINTMENT (OUTPATIENT)
Facility: HOSPITAL | Age: 60
End: 2025-05-19
Payer: MEDICARE

## 2025-05-19 ENCOUNTER — HOSPITAL ENCOUNTER (OUTPATIENT)
Dept: NEUROLOGY | Facility: HOSPITAL | Age: 60
End: 2025-05-19
Payer: MEDICARE

## 2025-05-21 ENCOUNTER — APPOINTMENT (OUTPATIENT)
Dept: OPHTHALMOLOGY | Facility: CLINIC | Age: 60
End: 2025-05-21
Payer: MEDICARE

## 2025-05-22 ENCOUNTER — TELEPHONE (OUTPATIENT)
Dept: PRIMARY CARE | Facility: CLINIC | Age: 60
End: 2025-05-22
Payer: MEDICARE

## 2025-05-22 NOTE — TELEPHONE ENCOUNTER
Pt states she has UTI symptoms. Wants to know if you can put a lab order in and can she get an appt this week. Please advise.

## 2025-05-23 ENCOUNTER — OFFICE VISIT (OUTPATIENT)
Dept: PRIMARY CARE | Facility: CLINIC | Age: 60
End: 2025-05-23
Payer: MEDICARE

## 2025-05-23 VITALS
RESPIRATION RATE: 18 BRPM | BODY MASS INDEX: 29.27 KG/M2 | WEIGHT: 155 LBS | SYSTOLIC BLOOD PRESSURE: 137 MMHG | DIASTOLIC BLOOD PRESSURE: 80 MMHG | HEIGHT: 61 IN | OXYGEN SATURATION: 97 % | HEART RATE: 84 BPM | TEMPERATURE: 97 F

## 2025-05-23 DIAGNOSIS — N39.0 URINARY TRACT INFECTION WITHOUT HEMATURIA, SITE UNSPECIFIED: Primary | ICD-10-CM

## 2025-05-23 LAB
POC APPEARANCE, URINE: CLEAR
POC BILIRUBIN, URINE: NEGATIVE
POC BLOOD, URINE: NEGATIVE
POC COLOR, URINE: YELLOW
POC GLUCOSE, URINE: NEGATIVE MG/DL
POC KETONES, URINE: NEGATIVE MG/DL
POC LEUKOCYTES, URINE: NEGATIVE
POC NITRITE,URINE: NEGATIVE
POC PH, URINE: 6 PH
POC PROTEIN, URINE: NEGATIVE MG/DL
POC SPECIFIC GRAVITY, URINE: 1.01
POC UROBILINOGEN, URINE: 0.2 EU/DL

## 2025-05-23 PROCEDURE — 99212 OFFICE O/P EST SF 10 MIN: CPT | Performed by: FAMILY MEDICINE

## 2025-05-23 PROCEDURE — 81003 URINALYSIS AUTO W/O SCOPE: CPT | Performed by: FAMILY MEDICINE

## 2025-05-23 PROCEDURE — 4010F ACE/ARB THERAPY RXD/TAKEN: CPT | Performed by: FAMILY MEDICINE

## 2025-05-23 PROCEDURE — G2211 COMPLEX E/M VISIT ADD ON: HCPCS | Performed by: FAMILY MEDICINE

## 2025-05-23 PROCEDURE — 3008F BODY MASS INDEX DOCD: CPT | Performed by: FAMILY MEDICINE

## 2025-05-23 PROCEDURE — 3079F DIAST BP 80-89 MM HG: CPT | Performed by: FAMILY MEDICINE

## 2025-05-23 PROCEDURE — 3075F SYST BP GE 130 - 139MM HG: CPT | Performed by: FAMILY MEDICINE

## 2025-05-23 ASSESSMENT — PATIENT HEALTH QUESTIONNAIRE - PHQ9
1. LITTLE INTEREST OR PLEASURE IN DOING THINGS: NOT AT ALL
SUM OF ALL RESPONSES TO PHQ9 QUESTIONS 1 AND 2: 0
2. FEELING DOWN, DEPRESSED OR HOPELESS: NOT AT ALL

## 2025-05-23 ASSESSMENT — ENCOUNTER SYMPTOMS: FREQUENCY: 1

## 2025-05-23 NOTE — PROGRESS NOTES
"Subjective   Patient ID: Rachael Ruiz is a 59 y.o. female who presents for UTI.    UTI   This is a new problem. The current episode started in the past 7 days. The problem has been gradually improving. The patient is experiencing no pain. There has been no fever. Associated symptoms include frequency.      Approximately 1 week ago she developed symptoms including urinary urgency, frequency and lower abdominal pressure.  Did not have any dysuria  She sees a physician for Suboxone, and had seen them on Saturday and was given a prescription for Macrobid.  Since completing the antibiotic, her symptoms have improved.  Abdominal pressure has resolved.  No fever or dysuria.  She is a diabetic and has been checking her glucose and it has been ranged from 117-130        Review of Systems   Genitourinary:  Positive for frequency.       Objective   /80   Pulse 84   Temp 36.1 °C (97 °F)   Resp 18   Ht 1.54 m (5' 0.63\")   Wt 70.3 kg (155 lb)   SpO2 97%   BMI 29.65 kg/m²     Physical Exam  Vitals reviewed.   Constitutional:       General: She is not in acute distress.     Appearance: Normal appearance. She is well-developed.   HENT:      Head: Normocephalic.   Eyes:      Conjunctiva/sclera: Conjunctivae normal.   Cardiovascular:      Rate and Rhythm: Normal rate and regular rhythm.      Heart sounds: Normal heart sounds.   Pulmonary:      Effort: Pulmonary effort is normal.      Breath sounds: Normal breath sounds.   Abdominal:      Palpations: Abdomen is soft.      Tenderness: There is no abdominal tenderness.   Skin:     Findings: No rash.   Neurological:      Mental Status: She is alert.   Psychiatric:         Mood and Affect: Mood normal.         Behavior: Behavior normal.         Assessment/Plan   Assessment & Plan  Urinary tract infection without hematuria, site unspecified    Orders:    POCT UA Automated manually resulted    Her symptoms have been improving after being given Macrobid by her specialist last " weekend, and UA today is normal.  I suspect that she had a UTI which has since resolved with the antibiotic she was given.  Follow-up if symptoms recur again in future

## 2025-05-27 ENCOUNTER — TELEPHONE (OUTPATIENT)
Dept: PRIMARY CARE | Facility: CLINIC | Age: 60
End: 2025-05-27
Payer: MEDICARE

## 2025-05-27 NOTE — TELEPHONE ENCOUNTER
Pt has been having URI symptoms for a few days. Wants to know if she can do a VV for this today. Please advise.

## 2025-05-28 ENCOUNTER — TELEMEDICINE (OUTPATIENT)
Dept: PRIMARY CARE | Facility: CLINIC | Age: 60
End: 2025-05-28
Payer: MEDICARE

## 2025-05-28 DIAGNOSIS — J20.9 ACUTE BRONCHITIS, UNSPECIFIED ORGANISM: Primary | ICD-10-CM

## 2025-05-28 PROCEDURE — 99213 OFFICE O/P EST LOW 20 MIN: CPT | Performed by: FAMILY MEDICINE

## 2025-05-28 PROCEDURE — G2211 COMPLEX E/M VISIT ADD ON: HCPCS | Performed by: FAMILY MEDICINE

## 2025-05-28 PROCEDURE — 4010F ACE/ARB THERAPY RXD/TAKEN: CPT | Performed by: FAMILY MEDICINE

## 2025-05-28 ASSESSMENT — ENCOUNTER SYMPTOMS
WHEEZING: 0
SORE THROAT: 0
COUGH: 1
SINUS PRESSURE: 0
FEVER: 0

## 2025-05-28 NOTE — PROGRESS NOTES
Subjective   Patient ID: Rachael Ruiz is a 59 y.o. female who presents for No chief complaint on file..    URI   This is a new problem. The current episode started in the past 7 days. The problem has been unchanged. Associated symptoms include coughing. Pertinent negatives include no congestion, ear pain, sore throat or wheezing.      Virtual or Telephone Consent    An interactive audio and video telecommunication system which permits real time communications between the patient (at the originating site) and provider (at the distant site) was utilized to provide this telehealth service.   Verbal consent was requested and obtained from Rachael Ruiz on this date, 05/28/25 for a telehealth visit and the patient's location was confirmed at the time of the visit.     She has had a productive cough present for 5 days  No nasal congestion, nasal drainage, sinus pressure.  No ear pain or sore throat.  No fever.  No wheezing  Cough is overall unchanged      Review of Systems   Constitutional:  Negative for fever.   HENT:  Negative for congestion, ear pain, sinus pressure and sore throat.    Respiratory:  Positive for cough. Negative for wheezing.        Objective   There were no vitals taken for this visit.    Physical Exam  Constitutional:       General: She is not in acute distress.     Appearance: Normal appearance. She is well-developed.   Pulmonary:      Effort: Pulmonary effort is normal.   Skin:     Findings: No rash.   Neurological:      Mental Status: She is alert.   Psychiatric:         Mood and Affect: Mood normal.         Behavior: Behavior normal.         Assessment/Plan   Assessment & Plan  Acute bronchitis, unspecified organism         She presents today with a productive cough which has been present for approximately 5 days.  We discussed that at this point the cough is most likely viral, however we could consider an antibiotic if cough is not improving over the next several days.  Recommended  symptomatic care and monitoring.  If her cough has not improved by Friday, recommended contacting my office and we could consider sending in an antibiotic.  She declines antitussive medication today.  She will contact me if not improving in the next 2 days

## 2025-06-01 DIAGNOSIS — I10 ESSENTIAL HYPERTENSION, BENIGN: ICD-10-CM

## 2025-06-01 DIAGNOSIS — J44.9 CHRONIC OBSTRUCTIVE PULMONARY DISEASE, UNSPECIFIED COPD TYPE (MULTI): ICD-10-CM

## 2025-06-01 DIAGNOSIS — E11.42 TYPE 2 DIABETES MELLITUS WITH DIABETIC POLYNEUROPATHY, WITHOUT LONG-TERM CURRENT USE OF INSULIN: ICD-10-CM

## 2025-06-01 DIAGNOSIS — E11.9 TYPE 2 DIABETES MELLITUS WITHOUT COMPLICATION, WITHOUT LONG-TERM CURRENT USE OF INSULIN: ICD-10-CM

## 2025-06-01 DIAGNOSIS — E11.9 TYPE 2 DIABETES MELLITUS WITHOUT COMPLICATION, UNSPECIFIED WHETHER LONG TERM INSULIN USE: ICD-10-CM

## 2025-06-02 DIAGNOSIS — E11.9 TYPE 2 DIABETES MELLITUS WITHOUT COMPLICATION, UNSPECIFIED WHETHER LONG TERM INSULIN USE: ICD-10-CM

## 2025-06-02 DIAGNOSIS — E11.9 TYPE 2 DIABETES MELLITUS WITHOUT COMPLICATION, WITHOUT LONG-TERM CURRENT USE OF INSULIN: ICD-10-CM

## 2025-06-02 DIAGNOSIS — E11.42 TYPE 2 DIABETES MELLITUS WITH DIABETIC POLYNEUROPATHY, WITHOUT LONG-TERM CURRENT USE OF INSULIN: ICD-10-CM

## 2025-06-02 RX ORDER — HYDROCHLOROTHIAZIDE 12.5 MG/1
12.5 TABLET ORAL DAILY
Qty: 90 TABLET | Refills: 5 | Status: SHIPPED | OUTPATIENT
Start: 2025-06-02

## 2025-06-02 RX ORDER — ALBUTEROL SULFATE 90 UG/1
INHALANT RESPIRATORY (INHALATION)
Qty: 18 G | Refills: 5 | Status: SHIPPED | OUTPATIENT
Start: 2025-06-02

## 2025-06-02 RX ORDER — DULAGLUTIDE 3 MG/.5ML
3 INJECTION, SOLUTION SUBCUTANEOUS
Qty: 2 ML | Refills: 5 | Status: SHIPPED | OUTPATIENT
Start: 2025-06-08

## 2025-06-02 RX ORDER — METFORMIN HYDROCHLORIDE 1000 MG/1
1000 TABLET ORAL
Qty: 180 TABLET | Refills: 5 | Status: SHIPPED | OUTPATIENT
Start: 2025-06-02

## 2025-06-02 RX ORDER — BLOOD SUGAR DIAGNOSTIC
1 STRIP MISCELLANEOUS 4 TIMES DAILY
Qty: 100 STRIP | Refills: 11 | Status: SHIPPED | OUTPATIENT
Start: 2025-06-02

## 2025-06-02 RX ORDER — METFORMIN HYDROCHLORIDE 1000 MG/1
1000 TABLET ORAL
Qty: 180 TABLET | Refills: 5 | Status: SHIPPED | OUTPATIENT
Start: 2025-06-02 | End: 2025-06-02 | Stop reason: SDUPTHER

## 2025-06-02 RX ORDER — DULAGLUTIDE 3 MG/.5ML
3 INJECTION, SOLUTION SUBCUTANEOUS
Qty: 2 ML | Refills: 5 | Status: SHIPPED | OUTPATIENT
Start: 2025-06-08 | End: 2025-06-02 | Stop reason: SDUPTHER

## 2025-06-02 RX ORDER — BLOOD SUGAR DIAGNOSTIC
STRIP MISCELLANEOUS 4 TIMES DAILY
Qty: 100 STRIP | Refills: 5 | Status: SHIPPED | OUTPATIENT
Start: 2025-06-02 | End: 2025-06-02 | Stop reason: SDUPTHER

## 2025-06-06 ENCOUNTER — APPOINTMENT (OUTPATIENT)
Dept: ORTHOPEDIC SURGERY | Facility: CLINIC | Age: 60
End: 2025-06-06
Payer: MEDICARE

## 2025-06-11 DIAGNOSIS — G25.81 RESTLESS LEGS SYNDROME: ICD-10-CM

## 2025-06-11 DIAGNOSIS — M54.50 ACUTE EXACERBATION OF CHRONIC LOW BACK PAIN: ICD-10-CM

## 2025-06-11 DIAGNOSIS — G89.29 ACUTE EXACERBATION OF CHRONIC LOW BACK PAIN: ICD-10-CM

## 2025-06-11 DIAGNOSIS — E11.42 TYPE 2 DIABETES MELLITUS WITH DIABETIC POLYNEUROPATHY, WITHOUT LONG-TERM CURRENT USE OF INSULIN: ICD-10-CM

## 2025-06-11 RX ORDER — ROPINIROLE 1 MG/1
2 TABLET, FILM COATED ORAL NIGHTLY
Qty: 60 TABLET | Refills: 3 | Status: SHIPPED | OUTPATIENT
Start: 2025-06-11

## 2025-06-11 RX ORDER — LIDOCAINE 50 MG/G
1 PATCH TOPICAL DAILY
Qty: 10 PATCH | Refills: 0 | Status: SHIPPED | OUTPATIENT
Start: 2025-06-11

## 2025-06-11 RX ORDER — GABAPENTIN 800 MG/1
800 TABLET ORAL 4 TIMES DAILY
Qty: 120 TABLET | Refills: 3 | Status: SHIPPED | OUTPATIENT
Start: 2025-06-11

## 2025-06-16 ENCOUNTER — TELEPHONE (OUTPATIENT)
Dept: PRIMARY CARE | Facility: CLINIC | Age: 60
End: 2025-06-16

## 2025-06-16 ENCOUNTER — APPOINTMENT (OUTPATIENT)
Facility: HOSPITAL | Age: 60
End: 2025-06-16
Payer: MEDICARE

## 2025-06-16 ENCOUNTER — HOSPITAL ENCOUNTER (OUTPATIENT)
Dept: NEUROLOGY | Facility: HOSPITAL | Age: 60
End: 2025-06-16
Payer: MEDICARE

## 2025-06-16 ENCOUNTER — DOCUMENTATION (OUTPATIENT)
Dept: NEUROLOGY | Facility: HOSPITAL | Age: 60
End: 2025-06-16

## 2025-06-16 NOTE — TELEPHONE ENCOUNTER
Pt called in stating she took a home test and has a UTI pt would like some meds sent or a possible virtual appt .

## 2025-06-16 NOTE — PROGRESS NOTES
[facility]      6/16/2025      Rachael Ruiz cancelled this EMG+NCV study scheduled on 6/16/2025.        06/16/25 at 9:25 AM - Sid Nguyen MD

## 2025-06-17 ENCOUNTER — OFFICE VISIT (OUTPATIENT)
Dept: PRIMARY CARE | Facility: CLINIC | Age: 60
End: 2025-06-17
Payer: MEDICARE

## 2025-06-17 VITALS — HEART RATE: 88 BPM | DIASTOLIC BLOOD PRESSURE: 86 MMHG | OXYGEN SATURATION: 97 % | SYSTOLIC BLOOD PRESSURE: 127 MMHG

## 2025-06-17 DIAGNOSIS — E11.51 TYPE 2 DIABETES MELLITUS WITH DIABETIC PERIPHERAL ANGIOPATHY WITHOUT GANGRENE, WITHOUT LONG-TERM CURRENT USE OF INSULIN (MULTI): ICD-10-CM

## 2025-06-17 DIAGNOSIS — E53.8 B12 DEFICIENCY: ICD-10-CM

## 2025-06-17 DIAGNOSIS — I10 ESSENTIAL HYPERTENSION, BENIGN: ICD-10-CM

## 2025-06-17 DIAGNOSIS — E78.49 OTHER HYPERLIPIDEMIA: ICD-10-CM

## 2025-06-17 DIAGNOSIS — N39.0 URINARY TRACT INFECTION WITHOUT HEMATURIA, SITE UNSPECIFIED: Primary | ICD-10-CM

## 2025-06-17 LAB
POC APPEARANCE, URINE: ABNORMAL
POC BILIRUBIN, URINE: ABNORMAL
POC BLOOD, URINE: ABNORMAL
POC COLOR, URINE: ABNORMAL
POC GLUCOSE, URINE: ABNORMAL MG/DL
POC HEMOGLOBIN A1C: 5.9 % (ref 4.2–6.5)
POC KETONES, URINE: ABNORMAL MG/DL
POC LEUKOCYTES, URINE: ABNORMAL
POC NITRITE,URINE: POSITIVE
POC PH, URINE: 5 PH
POC PROTEIN, URINE: ABNORMAL MG/DL
POC SPECIFIC GRAVITY, URINE: 1.02
POC UROBILINOGEN, URINE: 1 EU/DL

## 2025-06-17 PROCEDURE — 3044F HG A1C LEVEL LT 7.0%: CPT | Performed by: FAMILY MEDICINE

## 2025-06-17 PROCEDURE — 99214 OFFICE O/P EST MOD 30 MIN: CPT | Performed by: FAMILY MEDICINE

## 2025-06-17 PROCEDURE — 3079F DIAST BP 80-89 MM HG: CPT | Performed by: FAMILY MEDICINE

## 2025-06-17 PROCEDURE — 3074F SYST BP LT 130 MM HG: CPT | Performed by: FAMILY MEDICINE

## 2025-06-17 PROCEDURE — 83036 HEMOGLOBIN GLYCOSYLATED A1C: CPT | Performed by: FAMILY MEDICINE

## 2025-06-17 PROCEDURE — G2211 COMPLEX E/M VISIT ADD ON: HCPCS | Performed by: FAMILY MEDICINE

## 2025-06-17 PROCEDURE — 81002 URINALYSIS NONAUTO W/O SCOPE: CPT | Performed by: FAMILY MEDICINE

## 2025-06-17 PROCEDURE — 4010F ACE/ARB THERAPY RXD/TAKEN: CPT | Performed by: FAMILY MEDICINE

## 2025-06-17 RX ORDER — NITROFURANTOIN 25; 75 MG/1; MG/1
100 CAPSULE ORAL 2 TIMES DAILY
Qty: 14 CAPSULE | Refills: 0 | Status: SHIPPED | OUTPATIENT
Start: 2025-06-17 | End: 2025-06-24

## 2025-06-17 RX ORDER — CYANOCOBALAMIN 1000 UG/ML
1000 INJECTION, SOLUTION INTRAMUSCULAR; SUBCUTANEOUS ONCE
Status: SHIPPED | OUTPATIENT
Start: 2025-06-17

## 2025-06-17 ASSESSMENT — ENCOUNTER SYMPTOMS
FREQUENCY: 1
COUGH: 0
NUMBNESS: 1
FEVER: 0
DYSURIA: 0

## 2025-06-17 NOTE — PROGRESS NOTES
Subjective   Patient ID: Rachael Ruiz is a 59 y.o. female who presents for UTI.    UTI   This is a new problem. Episode onset: 6/13/2025. Associated symptoms include frequency and urgency. Treatments tried: Cranberry.      Here today for possible UTI.  She has had a 4-day history of urinary frequency and urgency.  No dysuria.  No flank pain or abdominal pain.  She did have a UTI approximately 1 month ago which resolved with Macrobid  She is scheduled later this week for her full 3-month follow-up, so we also did this today  Diabetes mellitus: She is currently taking Trulicity 3 mg weekly and metformin 1000 mg twice daily.  Average glucose out of the office is 116  She takes gabapentin 800 mg 4 times per day for diabetic neuropathy.  She has numbness in the toes of both feet but denies any significant pain in her lower extremities.  Feels that gabapentin is helping.  She states she is scheduled to see neurology in September/October.  No adverse effects with gabapentin  Hypertension: Currently takes HCTZ 12.5 mg daily and losartan 100 mg daily.  No elevated BP readings out of the office  Takes atorvastatin 80 mg daily for hyperlipidemia.  Last lipid panel checked 3/14/2025 showed LDL of 110  She is currently on p.o. vitamin B12 1000 mcg daily for B12 deficiency.  She has difficulty paying for her p.o. B12 and would like to switch to IM vitamin B12.  Her last B12 level checked 3/14/2025 was greater than 2000        Review of Systems   Constitutional:  Negative for fever.   Respiratory:  Negative for cough.    Genitourinary:  Positive for frequency and urgency. Negative for dysuria.   Neurological:  Positive for numbness.       Objective   /86   Pulse 88   SpO2 97%     Physical Exam  Vitals reviewed.   Constitutional:       General: She is not in acute distress.     Appearance: Normal appearance. She is well-developed.   HENT:      Head: Normocephalic.   Eyes:      Conjunctiva/sclera: Conjunctivae normal.    Cardiovascular:      Rate and Rhythm: Normal rate and regular rhythm.      Heart sounds: Normal heart sounds.   Pulmonary:      Effort: Pulmonary effort is normal.      Breath sounds: Normal breath sounds.   Abdominal:      Palpations: Abdomen is soft.      Tenderness: There is no abdominal tenderness. There is no right CVA tenderness or left CVA tenderness.   Musculoskeletal:      Right lower leg: No edema.      Left lower leg: No edema.   Skin:     Findings: No rash.   Neurological:      Mental Status: She is alert.      Sensory: No sensory deficit.   Psychiatric:         Mood and Affect: Mood normal.         Behavior: Behavior normal.         Assessment/Plan   Assessment & Plan  Urinary tract infection without hematuria, site unspecified    Orders:    POCT UA (nonautomated) manually resulted    cyanocobalamin (Vitamin B-12) injection 1,000 mcg    Urine Culture    nitrofurantoin, macrocrystal-monohydrate, (Macrobid) 100 mg capsule; Take 1 capsule (100 mg) by mouth 2 times a day for 7 days.    POCT glycosylated hemoglobin (Hb A1C) manually resulted    Type 2 diabetes mellitus with diabetic peripheral angiopathy without gangrene, without long-term current use of insulin (Multi)    Orders:    POCT UA (nonautomated) manually resulted    cyanocobalamin (Vitamin B-12) injection 1,000 mcg    Urine Culture    nitrofurantoin, macrocrystal-monohydrate, (Macrobid) 100 mg capsule; Take 1 capsule (100 mg) by mouth 2 times a day for 7 days.    POCT glycosylated hemoglobin (Hb A1C) manually resulted    B12 deficiency    Orders:    POCT UA (nonautomated) manually resulted    cyanocobalamin (Vitamin B-12) injection 1,000 mcg    Urine Culture    nitrofurantoin, macrocrystal-monohydrate, (Macrobid) 100 mg capsule; Take 1 capsule (100 mg) by mouth 2 times a day for 7 days.    POCT glycosylated hemoglobin (Hb A1C) manually resulted    Essential hypertension, benign         Other hyperlipidemia         UTI: UA today indicates a  possible UTI.  Will start treatment with nitrofurantoin and send urine for culture  Hypertension: This is well-controlled with blood pressure of 127/86.  Continue current medications  Hyperlipidemia: Stable on atorvastatin.  Continue atorvastatin at current dose  B12 deficiency: She is having difficulty affording p.o. vitamin B12 and would like to transition to IM vitamin B12.  There were no signs of B12 deficiency on her last labs.  She was given a B12 IM injection today.  Continue monthly until she is able to restart p.o. vitamin B12  Diabetes mellitus: A1c today is at goal at 5.9%.  Continue current medications and follow-up in 3 months  Diabetic neuropathy has been stable on gabapentin.  Continue medication until she is able to establish with neurology  An [facility] report was printed and I have personally reviewed the results.  The report will be scanned into the health record.  I have considered the risk of abuse, dependance, addiction, and diversion.  I believe it is clinically appropriate for this patient to continue taking this medication.  Follow-up in 3 months for recheck

## 2025-06-17 NOTE — ASSESSMENT & PLAN NOTE
Orders:    POCT UA (nonautomated) manually resulted    cyanocobalamin (Vitamin B-12) injection 1,000 mcg    Urine Culture    nitrofurantoin, macrocrystal-monohydrate, (Macrobid) 100 mg capsule; Take 1 capsule (100 mg) by mouth 2 times a day for 7 days.    POCT glycosylated hemoglobin (Hb A1C) manually resulted

## 2025-06-19 DIAGNOSIS — R39.9 UTI SYMPTOMS: Primary | ICD-10-CM

## 2025-06-19 DIAGNOSIS — E78.49 OTHER HYPERLIPIDEMIA: ICD-10-CM

## 2025-06-19 DIAGNOSIS — E11.51 TYPE 2 DIABETES MELLITUS WITH DIABETIC PERIPHERAL ANGIOPATHY WITHOUT GANGRENE, WITHOUT LONG-TERM CURRENT USE OF INSULIN (MULTI): ICD-10-CM

## 2025-06-19 DIAGNOSIS — I10 ESSENTIAL HYPERTENSION, BENIGN: ICD-10-CM

## 2025-06-20 ENCOUNTER — APPOINTMENT (OUTPATIENT)
Dept: PRIMARY CARE | Facility: CLINIC | Age: 60
End: 2025-06-20
Payer: MEDICARE

## 2025-06-20 LAB — BACTERIA UR CULT: NORMAL

## 2025-06-24 ENCOUNTER — TELEPHONE (OUTPATIENT)
Dept: PRIMARY CARE | Facility: CLINIC | Age: 60
End: 2025-06-24
Payer: MEDICARE

## 2025-06-25 ENCOUNTER — TELEPHONE (OUTPATIENT)
Dept: PRIMARY CARE | Facility: CLINIC | Age: 60
End: 2025-06-25
Payer: MEDICARE

## 2025-06-26 ENCOUNTER — TELEMEDICINE (OUTPATIENT)
Dept: PRIMARY CARE | Facility: CLINIC | Age: 60
End: 2025-06-26
Payer: MEDICARE

## 2025-06-26 ENCOUNTER — TELEPHONE (OUTPATIENT)
Dept: PRIMARY CARE | Facility: CLINIC | Age: 60
End: 2025-06-26

## 2025-06-26 DIAGNOSIS — R10.9 ABDOMINAL PAIN, UNSPECIFIED ABDOMINAL LOCATION: Primary | ICD-10-CM

## 2025-06-26 PROCEDURE — 4010F ACE/ARB THERAPY RXD/TAKEN: CPT | Performed by: FAMILY MEDICINE

## 2025-06-26 PROCEDURE — 3044F HG A1C LEVEL LT 7.0%: CPT | Performed by: FAMILY MEDICINE

## 2025-06-26 PROCEDURE — G2211 COMPLEX E/M VISIT ADD ON: HCPCS | Performed by: FAMILY MEDICINE

## 2025-06-26 PROCEDURE — 99213 OFFICE O/P EST LOW 20 MIN: CPT | Performed by: FAMILY MEDICINE

## 2025-06-26 ASSESSMENT — ENCOUNTER SYMPTOMS
ANOREXIA: 1
BELCHING: 0
MYALGIAS: 0
WEIGHT LOSS: 0
ABDOMINAL PAIN: 1
VOMITING: 0
NAUSEA: 0
FREQUENCY: 1
DYSURIA: 0
HEADACHES: 0
ARTHRALGIAS: 0
DIARRHEA: 0
HEMATOCHEZIA: 0
FEVER: 1
CONSTIPATION: 1
HEMATURIA: 0
FLATUS: 0

## 2025-06-26 NOTE — TELEPHONE ENCOUNTER
Patient states she is having some stomach issues (a twinge) and when she sits it feels like a bone is pushing on her organs      Please Advise    She would be able to come this afternoon      [phone]

## 2025-06-26 NOTE — PROGRESS NOTES
Subjective   Patient ID: Rachael Ruiz is a 59 y.o. female who presents for No chief complaint on file..    Abdominal Pain  This is a new problem. The current episode started in the past 7 days. The onset quality is sudden. The problem occurs 2 to 4 times per day. The most recent episode lasted 0.2 hours. The problem has been unchanged. The pain is located in the LLQ and suprapubic region. The pain is at a severity of 1/10. The quality of the pain is dull. The abdominal pain does not radiate. Associated symptoms include anorexia, constipation, a fever and frequency. Pertinent negatives include no arthralgias, belching, diarrhea, dysuria, flatus, headaches, hematochezia, hematuria, melena, myalgias, nausea, vomiting or weight loss. The pain is aggravated by certain positions. The pain is relieved by Nothing. Prior diagnostic workup includes lower endoscopy.      Virtual or Telephone Consent    An interactive audio and video telecommunication system which permits real time communications between the patient (at the originating site) and provider (at the distant site) was utilized to provide this telehealth service.   Verbal consent was requested and obtained from Rachael Ruiz on this date, 06/26/25 for a telehealth visit and the patient's location was confirmed at the time of the visit.      Over the past 3 days she has noticed an intermittent twinging sensation in her abdomen  This occurs intermittently.  When it occurs she will get a mild twinge sensation in her left abdomen near her belly button area.  This typically gets triggered when she moves to certain position changes  This will last approximately 1 minute and then resolved.  She denies any severe abdominal pain or any constant abdominal pain  Over the past month, she has also noticed a feeling like something is pushing on her abdomen on the right side.  This also occurs with certain position changes  She is up-to-date on screening colonoscopy.  She  had a colonoscopy done in February of this year and repeat 3 years recommended  She has chronic constipation which is related to her Suboxone.  Has a bowel movement every 3 days  No known fever.  No nausea or vomiting  Previous abdominal surgeries: Has had a hysterectomy            Review of Systems   Constitutional:  Positive for fever. Negative for weight loss.   Gastrointestinal:  Positive for abdominal pain, anorexia and constipation. Negative for diarrhea, flatus, hematochezia, melena, nausea and vomiting.   Genitourinary:  Positive for frequency. Negative for dysuria and hematuria.   Musculoskeletal:  Negative for arthralgias and myalgias.   Neurological:  Negative for headaches.       Objective   There were no vitals taken for this visit.    Physical Exam  Constitutional:       General: She is not in acute distress.     Appearance: Normal appearance. She is well-developed.   Pulmonary:      Effort: Pulmonary effort is normal.   Skin:     Findings: No rash.   Neurological:      Mental Status: She is alert.   Psychiatric:         Mood and Affect: Mood normal.         Behavior: Behavior normal.         Assessment/Plan   Assessment & Plan  Abdominal pain, unspecified abdominal location    Orders:    US abdomen complete; Future    She presents today with a 3-day history intermittent, brief, mild twinge sensation involving her left abdomen, as well as a feeling like something is pushing on her abdomen on the right side over the past month.  Both of these sensations are intermittent and only occur with certain position changes.  She otherwise denies any worrisome symptoms including nausea/vomiting, or any severe or continuous or worsening abdominal pain  We will start by ordering an abdominal ultrasound to further evaluate.  Plan on following up by phone once I have the results to discuss results and further management

## 2025-06-26 NOTE — TELEPHONE ENCOUNTER
I spoke with her over the phone.  Over the past 3 days she has noticed occasional twinges involving her abdomen.  This is near her bellybutton on the left side.  She does have a history of chronic constipation with a bowel movement every 3 days.  She denies any significant abdominal pain or vomiting.  She also notices that when she sits in a certain position she feels like something is pushing on her abdomen.  She is not able to come into the office this morning due to issues with transportation.  I am going to see her later this morning for a virtual visit to further discuss her symptoms

## 2025-06-30 ENCOUNTER — APPOINTMENT (OUTPATIENT)
Facility: HOSPITAL | Age: 60
End: 2025-06-30
Payer: MEDICARE

## 2025-06-30 DIAGNOSIS — M54.16 LUMBAR RADICULOPATHY: ICD-10-CM

## 2025-06-30 DIAGNOSIS — M54.50 LUMBAR PAIN: ICD-10-CM

## 2025-06-30 PROCEDURE — 72148 MRI LUMBAR SPINE W/O DYE: CPT

## 2025-06-30 PROCEDURE — 72148 MRI LUMBAR SPINE W/O DYE: CPT | Performed by: RADIOLOGY

## 2025-06-30 RX ORDER — GADOTERATE MEGLUMINE 376.9 MG/ML
14 INJECTION INTRAVENOUS
Status: DISCONTINUED | OUTPATIENT
Start: 2025-06-30 | End: 2025-07-01 | Stop reason: HOSPADM

## 2025-07-02 ENCOUNTER — TELEPHONE (OUTPATIENT)
Dept: ORTHOPEDIC SURGERY | Facility: CLINIC | Age: 60
End: 2025-07-02

## 2025-07-02 ENCOUNTER — APPOINTMENT (OUTPATIENT)
Dept: OPHTHALMOLOGY | Facility: CLINIC | Age: 60
End: 2025-07-02
Payer: MEDICARE

## 2025-07-02 NOTE — TELEPHONE ENCOUNTER
Patient lvm stating she read the report for her recent MRI and states that it mentions a lot of damage on her right die.  She states that she sleeps on her right side and is asking if she should sleep on her left side to try to help prevent further damage.

## 2025-07-08 ENCOUNTER — APPOINTMENT (OUTPATIENT)
Dept: ORTHOPEDIC SURGERY | Facility: CLINIC | Age: 60
End: 2025-07-08
Payer: MEDICARE

## 2025-07-10 ENCOUNTER — APPOINTMENT (OUTPATIENT)
Dept: RADIOLOGY | Facility: HOSPITAL | Age: 60
End: 2025-07-10
Payer: MEDICARE

## 2025-07-11 ENCOUNTER — APPOINTMENT (OUTPATIENT)
Dept: RADIOLOGY | Facility: HOSPITAL | Age: 60
End: 2025-07-11
Payer: MEDICARE

## 2025-07-15 ENCOUNTER — TELEPHONE (OUTPATIENT)
Dept: PRIMARY CARE | Facility: CLINIC | Age: 60
End: 2025-07-15
Payer: MEDICARE

## 2025-07-15 ENCOUNTER — APPOINTMENT (OUTPATIENT)
Dept: RADIOLOGY | Facility: HOSPITAL | Age: 60
End: 2025-07-15
Payer: MEDICARE

## 2025-07-15 NOTE — TELEPHONE ENCOUNTER
Pt left vm. Missed her appt for her US. States she rescheduled this.     In addition, wants to know is it time for her to get her b-12. Please advise.

## 2025-07-16 ENCOUNTER — TELEPHONE (OUTPATIENT)
Dept: PRIMARY CARE | Facility: CLINIC | Age: 60
End: 2025-07-16
Payer: MEDICARE

## 2025-07-16 ENCOUNTER — TELEMEDICINE (OUTPATIENT)
Dept: PRIMARY CARE | Facility: CLINIC | Age: 60
End: 2025-07-16
Payer: MEDICARE

## 2025-07-16 DIAGNOSIS — R35.0 URINARY FREQUENCY: ICD-10-CM

## 2025-07-16 DIAGNOSIS — K59.09 CONSTIPATION, CHRONIC: Primary | ICD-10-CM

## 2025-07-16 PROCEDURE — 99213 OFFICE O/P EST LOW 20 MIN: CPT | Performed by: FAMILY MEDICINE

## 2025-07-16 PROCEDURE — 4010F ACE/ARB THERAPY RXD/TAKEN: CPT | Performed by: FAMILY MEDICINE

## 2025-07-16 PROCEDURE — 3044F HG A1C LEVEL LT 7.0%: CPT | Performed by: FAMILY MEDICINE

## 2025-07-16 PROCEDURE — G2211 COMPLEX E/M VISIT ADD ON: HCPCS | Performed by: FAMILY MEDICINE

## 2025-07-16 RX ORDER — VENLAFAXINE HYDROCHLORIDE 75 MG/1
CAPSULE, EXTENDED RELEASE ORAL
COMMUNITY
Start: 2025-06-18

## 2025-07-16 RX ORDER — POLYETHYLENE GLYCOL 3350 17 G/17G
17 POWDER, FOR SOLUTION ORAL DAILY PRN
Qty: 510 G | Refills: 1 | Status: SHIPPED | OUTPATIENT
Start: 2025-07-16

## 2025-07-16 ASSESSMENT — ENCOUNTER SYMPTOMS
FEVER: 1
ANOREXIA: 1
CONSTIPATION: 1
BELCHING: 0
MYALGIAS: 0
FLATUS: 0
HEMATOCHEZIA: 0
ARTHRALGIAS: 0
NAUSEA: 1
VOMITING: 1
DYSURIA: 0
HEMATURIA: 0
WEIGHT LOSS: 0
ABDOMINAL PAIN: 1
SWEATS: 1
DIARRHEA: 0
FREQUENCY: 0
HEADACHES: 0

## 2025-07-16 NOTE — PROGRESS NOTES
Subjective   Patient ID: Rachael Ruiz is a 59 y.o. female who presents for UTI.    UTI   This is a new problem. Episode onset: 2 days. Associated symptoms include nausea, sweats and vomiting. Pertinent negatives include no frequency or hematuria.   Abdominal Pain  This is a new problem. The current episode started 1 to 4 weeks ago. The onset quality is gradual. The problem occurs constantly. The most recent episode lasted 24 hours. The problem has been gradually worsening. The pain is located in the suprapubic region. The pain is at a severity of 7/10. The quality of the pain is dull and a sensation of fullness. The abdominal pain radiates to the back. Associated symptoms include anorexia, constipation, nausea and vomiting. Pertinent negatives include no arthralgias, belching, diarrhea, dysuria, flatus, frequency, headaches, hematochezia, hematuria, melena, myalgias or weight loss. The pain is aggravated by certain positions, coughing and movement. The pain is relieved by Certain positions and recumbency. Prior diagnostic workup includes lower endoscopy.      She is here today to follow-up on abdominal symptoms  She was seen on 6/26.  At that time she had reported a 1 month history of a sensation like something is pushing on her abdomen on the right side.  This would occur with certain position changes.  At that time we had ordered an abdominal ultrasound    She is scheduled to have her ultrasound done tomorrow  She has continued to have a sensation that something is pushing on her abdomen.  This now feels like it involves her entire abdomen.  No abdominal pain.  She did vomit yesterday and again today  She does have sweats.  She is not sure if she has any fever  She has had urinary frequency.  No dysuria or urgency  She does have a history of chronic constipation and takes Metamucil for this.  She typically has a bowel movement every 3 days.  Constipation is related to her Suboxone      She is up-to-date on  colonoscopy.  Her last colonoscopy was done in February of this year.  A repeat in 3 years was recommended      Review of Systems   Constitutional:  Negative for weight loss.   Gastrointestinal:  Positive for abdominal pain, anorexia, constipation, nausea and vomiting. Negative for diarrhea, flatus, hematochezia and melena.   Genitourinary:  Negative for dysuria, frequency and hematuria.   Musculoskeletal:  Negative for arthralgias and myalgias.   Neurological:  Negative for headaches.       Objective   There were no vitals taken for this visit.    Physical Exam  Constitutional:       General: She is not in acute distress.     Appearance: Normal appearance. She is well-developed.   Pulmonary:      Effort: Pulmonary effort is normal.     Skin:     Findings: No rash.     Neurological:      Mental Status: She is alert.     Psychiatric:         Mood and Affect: Mood normal.         Behavior: Behavior normal.         Assessment/Plan   Assessment & Plan  Constipation, chronic    Orders:    polyethylene glycol (Glycolax, Miralax) 17 gram/dose powder; Mix 17 g of powder and drink once daily as needed for constipation.    Urinary frequency    Orders:    Urinalysis with Reflex Culture and Microscopic; Future    She presents today with a history of abdominal pressure which has been present for almost 2 months.  She is scheduled to have an abdominal ultrasound tomorrow for this  If abdominal ultrasound is normal, then we discussed that chronic constipation may be contributing to her abdominal symptoms.  We we will start MiraLAX daily as needed, and we will contact her once I have her ultrasound results  Due to urinary frequency, we will order a urinalysis to rule out UTI  I did recommend that she follow-up in 1 to 2 weeks if her abdominal symptoms do not improve or resolve with MiraLAX

## 2025-07-16 NOTE — TELEPHONE ENCOUNTER
Has concerns for sweating and UTI.   Please advise.    Requesting UTI order to be placed so she can get this done tomorrow.

## 2025-07-17 ENCOUNTER — HOSPITAL ENCOUNTER (EMERGENCY)
Facility: HOSPITAL | Age: 60
Discharge: HOME | End: 2025-07-17
Attending: EMERGENCY MEDICINE
Payer: MEDICARE

## 2025-07-17 ENCOUNTER — APPOINTMENT (OUTPATIENT)
Dept: CARDIOLOGY | Facility: HOSPITAL | Age: 60
End: 2025-07-17
Payer: MEDICARE

## 2025-07-17 ENCOUNTER — APPOINTMENT (OUTPATIENT)
Dept: RADIOLOGY | Facility: HOSPITAL | Age: 60
End: 2025-07-17
Payer: MEDICARE

## 2025-07-17 ENCOUNTER — TELEPHONE (OUTPATIENT)
Dept: PRIMARY CARE | Facility: CLINIC | Age: 60
End: 2025-07-17

## 2025-07-17 VITALS
DIASTOLIC BLOOD PRESSURE: 90 MMHG | TEMPERATURE: 97.1 F | BODY MASS INDEX: 30.43 KG/M2 | SYSTOLIC BLOOD PRESSURE: 125 MMHG | HEIGHT: 60 IN | WEIGHT: 155 LBS | HEART RATE: 84 BPM | OXYGEN SATURATION: 96 % | RESPIRATION RATE: 16 BRPM

## 2025-07-17 DIAGNOSIS — R06.02 SHORTNESS OF BREATH: ICD-10-CM

## 2025-07-17 DIAGNOSIS — E83.42 HYPOMAGNESEMIA: ICD-10-CM

## 2025-07-17 DIAGNOSIS — R14.0 ABDOMINAL DISTENTION: Primary | ICD-10-CM

## 2025-07-17 DIAGNOSIS — K59.09 CHRONIC CONSTIPATION: ICD-10-CM

## 2025-07-17 LAB
ALBUMIN SERPL BCP-MCNC: 4.6 G/DL (ref 3.4–5)
ALP SERPL-CCNC: 85 U/L (ref 33–136)
ALT SERPL W P-5'-P-CCNC: 23 U/L (ref 7–45)
ANION GAP SERPL CALC-SCNC: 13 MMOL/L (ref 10–20)
AST SERPL W P-5'-P-CCNC: 18 U/L (ref 9–39)
BASOPHILS # BLD AUTO: 0.08 X10*3/UL (ref 0–0.1)
BASOPHILS NFR BLD AUTO: 0.6 %
BILIRUB SERPL-MCNC: 0.6 MG/DL (ref 0–1.2)
BNP SERPL-MCNC: 10 PG/ML (ref 0–99)
BUN SERPL-MCNC: 17 MG/DL (ref 6–23)
CALCIUM SERPL-MCNC: 9.6 MG/DL (ref 8.6–10.3)
CARDIAC TROPONIN I PNL SERPL HS: 4 NG/L (ref 0–13)
CARDIAC TROPONIN I PNL SERPL HS: 6 NG/L (ref 0–13)
CHLORIDE SERPL-SCNC: 101 MMOL/L (ref 98–107)
CO2 SERPL-SCNC: 27 MMOL/L (ref 21–32)
CREAT SERPL-MCNC: 0.81 MG/DL (ref 0.5–1.05)
EGFRCR SERPLBLD CKD-EPI 2021: 83 ML/MIN/1.73M*2
EOSINOPHIL # BLD AUTO: 0.22 X10*3/UL (ref 0–0.7)
EOSINOPHIL NFR BLD AUTO: 1.8 %
ERYTHROCYTE [DISTWIDTH] IN BLOOD BY AUTOMATED COUNT: 13 % (ref 11.5–14.5)
GLUCOSE SERPL-MCNC: 104 MG/DL (ref 74–99)
HCT VFR BLD AUTO: 41.6 % (ref 36–46)
HGB BLD-MCNC: 14.9 G/DL (ref 12–16)
IMM GRANULOCYTES # BLD AUTO: 0.05 X10*3/UL (ref 0–0.7)
IMM GRANULOCYTES NFR BLD AUTO: 0.4 % (ref 0–0.9)
LACTATE SERPL-SCNC: 1.7 MMOL/L (ref 0.4–2)
LIPASE SERPL-CCNC: 39 U/L (ref 9–82)
LYMPHOCYTES # BLD AUTO: 2.67 X10*3/UL (ref 1.2–4.8)
LYMPHOCYTES NFR BLD AUTO: 21.6 %
MAGNESIUM SERPL-MCNC: 1.27 MG/DL (ref 1.6–2.4)
MCH RBC QN AUTO: 33.4 PG (ref 26–34)
MCHC RBC AUTO-ENTMCNC: 35.8 G/DL (ref 32–36)
MCV RBC AUTO: 93 FL (ref 80–100)
MONOCYTES # BLD AUTO: 1.39 X10*3/UL (ref 0.1–1)
MONOCYTES NFR BLD AUTO: 11.2 %
NEUTROPHILS # BLD AUTO: 7.95 X10*3/UL (ref 1.2–7.7)
NEUTROPHILS NFR BLD AUTO: 64.4 %
NRBC BLD-RTO: 0 /100 WBCS (ref 0–0)
PLATELET # BLD AUTO: 297 X10*3/UL (ref 150–450)
POTASSIUM SERPL-SCNC: 3.5 MMOL/L (ref 3.5–5.3)
PROT SERPL-MCNC: 7.6 G/DL (ref 6.4–8.2)
RBC # BLD AUTO: 4.46 X10*6/UL (ref 4–5.2)
SODIUM SERPL-SCNC: 137 MMOL/L (ref 136–145)
WBC # BLD AUTO: 12.4 X10*3/UL (ref 4.4–11.3)

## 2025-07-17 PROCEDURE — 96361 HYDRATE IV INFUSION ADD-ON: CPT

## 2025-07-17 PROCEDURE — 80053 COMPREHEN METABOLIC PANEL: CPT | Performed by: EMERGENCY MEDICINE

## 2025-07-17 PROCEDURE — 83735 ASSAY OF MAGNESIUM: CPT | Performed by: EMERGENCY MEDICINE

## 2025-07-17 PROCEDURE — 2550000001 HC RX 255 CONTRASTS: Performed by: EMERGENCY MEDICINE

## 2025-07-17 PROCEDURE — 71275 CT ANGIOGRAPHY CHEST: CPT

## 2025-07-17 PROCEDURE — 93005 ELECTROCARDIOGRAM TRACING: CPT

## 2025-07-17 PROCEDURE — 84484 ASSAY OF TROPONIN QUANT: CPT | Performed by: EMERGENCY MEDICINE

## 2025-07-17 PROCEDURE — 2500000004 HC RX 250 GENERAL PHARMACY W/ HCPCS (ALT 636 FOR OP/ED): Performed by: EMERGENCY MEDICINE

## 2025-07-17 PROCEDURE — 74177 CT ABD & PELVIS W/CONTRAST: CPT

## 2025-07-17 PROCEDURE — 83690 ASSAY OF LIPASE: CPT | Performed by: EMERGENCY MEDICINE

## 2025-07-17 PROCEDURE — 99285 EMERGENCY DEPT VISIT HI MDM: CPT | Mod: 25 | Performed by: EMERGENCY MEDICINE

## 2025-07-17 PROCEDURE — 83880 ASSAY OF NATRIURETIC PEPTIDE: CPT | Performed by: EMERGENCY MEDICINE

## 2025-07-17 PROCEDURE — 85025 COMPLETE CBC W/AUTO DIFF WBC: CPT | Performed by: EMERGENCY MEDICINE

## 2025-07-17 PROCEDURE — 36415 COLL VENOUS BLD VENIPUNCTURE: CPT | Performed by: EMERGENCY MEDICINE

## 2025-07-17 PROCEDURE — 83605 ASSAY OF LACTIC ACID: CPT | Performed by: EMERGENCY MEDICINE

## 2025-07-17 PROCEDURE — 96360 HYDRATION IV INFUSION INIT: CPT | Mod: 59

## 2025-07-17 RX ORDER — LANOLIN ALCOHOL/MO/W.PET/CERES
400 CREAM (GRAM) TOPICAL ONCE
Status: COMPLETED | OUTPATIENT
Start: 2025-07-17 | End: 2025-07-17

## 2025-07-17 RX ADMIN — SODIUM CHLORIDE 500 ML: 0.9 INJECTION, SOLUTION INTRAVENOUS at 12:52

## 2025-07-17 RX ADMIN — IOHEXOL 75 ML: 350 INJECTION, SOLUTION INTRAVENOUS at 14:34

## 2025-07-17 RX ADMIN — MAGNESIUM OXIDE 400 MG (241.3 MG MAGNESIUM) TABLET 1 TABLET: TABLET at 15:08

## 2025-07-17 ASSESSMENT — LIFESTYLE VARIABLES
HAVE YOU EVER FELT YOU SHOULD CUT DOWN ON YOUR DRINKING: NO
HAVE PEOPLE ANNOYED YOU BY CRITICIZING YOUR DRINKING: NO
EVER HAD A DRINK FIRST THING IN THE MORNING TO STEADY YOUR NERVES TO GET RID OF A HANGOVER: NO
TOTAL SCORE: 0
EVER FELT BAD OR GUILTY ABOUT YOUR DRINKING: NO

## 2025-07-17 ASSESSMENT — PAIN DESCRIPTION - PAIN TYPE: TYPE: ACUTE PAIN

## 2025-07-17 ASSESSMENT — PAIN DESCRIPTION - DESCRIPTORS
DESCRIPTORS: BURNING;SHARP
DESCRIPTORS: SHARP;BURNING

## 2025-07-17 ASSESSMENT — ENCOUNTER SYMPTOMS
ABDOMINAL DISTENTION: 1
ABDOMINAL PAIN: 1
CONSTIPATION: 1
VOMITING: 1
NAUSEA: 1

## 2025-07-17 ASSESSMENT — PAIN DESCRIPTION - ONSET: ONSET: ONGOING

## 2025-07-17 ASSESSMENT — PAIN SCALES - GENERAL: PAINLEVEL_OUTOF10: 5 - MODERATE PAIN

## 2025-07-17 ASSESSMENT — PAIN DESCRIPTION - ORIENTATION: ORIENTATION: MID

## 2025-07-17 ASSESSMENT — PAIN DESCRIPTION - FREQUENCY: FREQUENCY: INTERMITTENT

## 2025-07-17 ASSESSMENT — PAIN DESCRIPTION - LOCATION: LOCATION: ABDOMEN

## 2025-07-17 ASSESSMENT — PAIN - FUNCTIONAL ASSESSMENT: PAIN_FUNCTIONAL_ASSESSMENT: 0-10

## 2025-07-17 NOTE — CONSULTS
Inpatient consult to Acute Care Surgery  Consult performed by: Kate Liang, APRN-CNP  Consult ordered by: Isela Chatman MD  Reason for consult: concern for appendicitis      General Surgery Consult Note      Patient: Rachael Ruiz  Unit/Bed: AC09/AC09  YOB: 1965  MRN: [id number]  Acct: [id number]   Admitting Diagnosis: No admission diagnoses are documented for this encounter.  Date:  7/17/2025  Hospital Day: 0  Attending: Isela Chatman MD    Complaint:  Chief Complaint   Patient presents with    Abdominal Pain     SOB since last night, bloated, constipated, sweating, vomitting x3 days. Takes suboxone. Hasn't taken blood pressure or diabetes meds in a few days due to nausea. Was supposed to have abd ultrasound done today but couldn't make it       History of Present Illness:  Patient is a 59 year old female with past medical history of anxiety, HTN, DM, COPD past substance abuse on suboxone who presented to the emergency room due to abdominal bloating, distention and constipation that began 3 days ago. Patient reports that she did have some nausea and 2 episodes of vomiting when eating. She denies any urinary issues. Denies blood in urine and stool.     CTAP was obtained in the ED and showed No abnormally dilated large or small bowel loops. Likely elongated/redundant appendix along the right pelvic sidewall where it is mildly thickened measuring 11 mm. No definite surrounding inflammatory changes. Moderate fecal residue in the proximal colon so general surgery was consulted for recommendations.     Allergies:  RX Allergies[1]   PMHx:  Medical History[2]  PSHx:  Surgical History[3]  Social Hx:  Social History[4]  Family Hx:  Family History[5]    Review of Systems:   Review of Systems   Gastrointestinal:  Positive for abdominal distention, abdominal pain, constipation, nausea and vomiting.   All other systems reviewed and are negative.    Physical Examination:    Visit Vitals  /84 (BP  Location: Right arm, Patient Position: Lying)   Pulse 74   Temp 36.2 °C (97.2 °F) (Temporal)   Resp 16      Physical Exam  Vitals reviewed.   Constitutional:       General: She is not in acute distress.     Appearance: She is overweight.   HENT:      Head: Normocephalic.      Nose: Nose normal.      Mouth/Throat:      Mouth: Mucous membranes are moist.     Cardiovascular:      Rate and Rhythm: Normal rate.   Pulmonary:      Effort: Pulmonary effort is normal.   Abdominal:      General: Abdomen is flat. Bowel sounds are normal.      Palpations: Abdomen is soft.      Tenderness: There is no abdominal tenderness.     Skin:     General: Skin is warm.      Capillary Refill: Capillary refill takes less than 2 seconds.     Neurological:      General: No focal deficit present.      Mental Status: She is alert and oriented to person, place, and time.     Psychiatric:         Mood and Affect: Mood normal.       LABS:  CBC:   Lab Results   Component Value Date    WBC 12.4 (H) 07/17/2025    RBC 4.46 07/17/2025    HGB 14.9 07/17/2025    HCT 41.6 07/17/2025    MCV 93 07/17/2025    MCH 33.4 07/17/2025    MCHC 35.8 07/17/2025    RDW 13.0 07/17/2025     07/17/2025     CBC with Differential:    Lab Results   Component Value Date    WBC 12.4 (H) 07/17/2025    RBC 4.46 07/17/2025    HGB 14.9 07/17/2025    HCT 41.6 07/17/2025     07/17/2025    MCV 93 07/17/2025    MCH 33.4 07/17/2025    MCHC 35.8 07/17/2025    RDW 13.0 07/17/2025    NRBC 0.0 07/17/2025    LYMPHOPCT 21.6 07/17/2025    MONOPCT 11.2 07/17/2025    EOSPCT 1.8 07/17/2025    BASOPCT 0.6 07/17/2025    MONOSABS 1.39 (H) 07/17/2025    LYMPHSABS 2.67 07/17/2025    EOSABS 0.22 07/17/2025    BASOSABS 0.08 07/17/2025     CMP:    Lab Results   Component Value Date     07/17/2025    K 3.5 07/17/2025     07/17/2025    CO2 27 07/17/2025    BUN 17 07/17/2025    CREATININE 0.81 07/17/2025    GLUCOSE 104 (H) 07/17/2025    PROT 7.6 07/17/2025    CALCIUM 9.6  "07/17/2025    BILITOT 0.6 07/17/2025    ALKPHOS 85 07/17/2025    AST 18 07/17/2025    ALT 23 07/17/2025     BMP:    Lab Results   Component Value Date     07/17/2025    K 3.5 07/17/2025     07/17/2025    CO2 27 07/17/2025    BUN 17 07/17/2025    CREATININE 0.81 07/17/2025    CALCIUM 9.6 07/17/2025    GLUCOSE 104 (H) 07/17/2025     Magnesium:  Lab Results   Component Value Date    MG 1.27 (L) 07/17/2025     Troponin:    Lab Results   Component Value Date    TROPHS 6 07/17/2025    TROPHS 4 07/17/2025     Lipid Panel:  No results found for: \"HDL\", \"CHHDL\", \"VLDL\", \"TRIG\", \"NHDL\"     Current Medications:  Current Medications[6]  CT angio chest for pulmonary embolism  Result Date: 7/17/2025  Interpreted By:  Mae Toney, STUDY: CT ANGIO CHEST FOR PULMONARY EMBOLISM; CT ABDOMEN PELVIS W IV CONTRAST;  7/17/2025 2:45 pm   INDICATION: Signs/Symptoms:shortness of breath, leg pain; Signs/Symptoms:abdominal pain and bloating.     COMPARISON: 03/07/2025 CT chest, 06/12/2023 CT abdomen and pelvis   ACCESSION NUMBER(S): [id number]; [id number]   ORDERING CLINICIAN: NICOLASA CHAUDHARI   TECHNIQUE: CT of the chest, abdomen, and pelvis was performed. Sagittal and coronal reconstructions were generated.  75 ML Omnipaque 350 intravenous contrast given for the examination.   FINDINGS: CHEST:       CHEST WALL AND LOWER NECK: Subcentimeter calcifications in the left thyroid lobe similar to the prior exam. Superficial chest wall excluded from imaging field. No significant axillary lymphadenopathy as visualized. Punctate calcifications in each breast similar to the prior exam.   MEDIASTINUM AND MARIO:  No significant mediastinal or hilar lymphadenopathy. Mild gaseous distention of the distal esophagus.   HEART AND VESSELS:  No pulmonary artery filling defect to suggest PE. The heart is normal in size. No significant pericardial effusion. No thoracic aortic aneurysm.  Scattered atherosclerotic calcifications including the coronary " arteries.   LUNGS, PLEURA, LARGE AIRWAYS:  Mild bullous/emphysematous changes. Bandlike densities scattered in the right hemithorax. No confluent airspace opacity or sizable pulmonary nodule. No significant pleural effusion. The central airways are patent.   BONES:  Heterogeneity and degenerative changes of the thoracic spine including flowing osteophytes anteriorly consistent with element of DISH similar to the previous exam.     ABDOMEN/PELVIS:       ABDOMINAL ORGANS:   LIVER: No focal lesion   GALL BLADDER AND BILIARY TREE: Gallbladder is distended. No calcified gallstone. No significant biliary dilatation.   SPLEEN: No focal lesion. Tiny isodense presumed splenule adjacent to the inferior pole.   PANCREAS: Slight fullness of the main duct similar to the prior exam allowing for technical differences.   ADRENALS: No adrenal mass   KIDNEYS AND URETERS: Subcentimeter hypodensities in each kidney. No hydronephrosis   BOWEL: No abnormally dilated large or small bowel loops. Likely elongated/redundant appendix along the right pelvic sidewall where it is mildly thickened measuring 11 mm image 104/149. No definite surrounding inflammatory changes. Moderate fecal residue in the proximal colon.   PERITONEUM, RETROPERITONEUM, NODES: No significant free fluid. No free air. No significant retroperitoneal lymphadenopathy.   VESSELS:  Heavy atherosclerotic calcifications. No abdominal aortic aneurysm.   PELVIS: Urinary bladder is moderately distended without focal wall thickening. Presumed surgical absence of the uterus. 1.8 cm cystic structure in anterior right pelvis adjacent to the vagina similar to the prior exam.   ABDOMINAL WALL: Small fat containing umbilical hernia   BONES: Laminectomy defects and posterior fusion hardware in the lumbar spine with moderate scoliosis, spondylolisthesis and varying levels of sclerosis and disc space narrowing/vacuum discs again seen. Stable subcentimeter sclerotic lesion in the right  sacrum.       CHEST:   No CT evidence of PE or other acute chest process. Mild linear probable scarring or atelectasis in the right lung similar to 03/07/2025.   ABDOMEN AND PELVIS:   Mild thickening of the tip of the appendix but without associated inflammatory changes. Early acute appendicitis not entirely excluded. Clinical correlation recommended and if there is any clinical concern for acute appendicitis follow-up imaging suggested.   Distended gallbladder.   Too small to characterize hypodense lesions in both kidneys.   18 mm cystic structure in the right pelvis similar to 06/12/2023. Differential includes Bartholin's gland and Robinson's duct cyst.   Postsurgical and productive/degenerative changes of the lumbar spine with scoliosis and multilevel spondylolisthesis.   Other findings as described above.   MACRO: None.     Signed by: Mae Toney 7/17/2025 3:30 PM Dictation workstation:   [id number]    CT abdomen pelvis w IV contrast  Result Date: 7/17/2025  Interpreted By:  Mae Toney, STUDY: CT ANGIO CHEST FOR PULMONARY EMBOLISM; CT ABDOMEN PELVIS W IV CONTRAST;  7/17/2025 2:45 pm   INDICATION: Signs/Symptoms:shortness of breath, leg pain; Signs/Symptoms:abdominal pain and bloating.     COMPARISON: 03/07/2025 CT chest, 06/12/2023 CT abdomen and pelvis   ACCESSION NUMBER(S): [id number]; [id number]   ORDERING CLINICIAN: NICOLASA CHAUDHARI   TECHNIQUE: CT of the chest, abdomen, and pelvis was performed. Sagittal and coronal reconstructions were generated.  75 ML Omnipaque 350 intravenous contrast given for the examination.   FINDINGS: CHEST:       CHEST WALL AND LOWER NECK: Subcentimeter calcifications in the left thyroid lobe similar to the prior exam. Superficial chest wall excluded from imaging field. No significant axillary lymphadenopathy as visualized. Punctate calcifications in each breast similar to the prior exam.   MEDIASTINUM AND MARIO:  No significant mediastinal or hilar lymphadenopathy. Mild gaseous  distention of the distal esophagus.   HEART AND VESSELS:  No pulmonary artery filling defect to suggest PE. The heart is normal in size. No significant pericardial effusion. No thoracic aortic aneurysm.  Scattered atherosclerotic calcifications including the coronary arteries.   LUNGS, PLEURA, LARGE AIRWAYS:  Mild bullous/emphysematous changes. Bandlike densities scattered in the right hemithorax. No confluent airspace opacity or sizable pulmonary nodule. No significant pleural effusion. The central airways are patent.   BONES:  Heterogeneity and degenerative changes of the thoracic spine including flowing osteophytes anteriorly consistent with element of DISH similar to the previous exam.     ABDOMEN/PELVIS:       ABDOMINAL ORGANS:   LIVER: No focal lesion   GALL BLADDER AND BILIARY TREE: Gallbladder is distended. No calcified gallstone. No significant biliary dilatation.   SPLEEN: No focal lesion. Tiny isodense presumed splenule adjacent to the inferior pole.   PANCREAS: Slight fullness of the main duct similar to the prior exam allowing for technical differences.   ADRENALS: No adrenal mass   KIDNEYS AND URETERS: Subcentimeter hypodensities in each kidney. No hydronephrosis   BOWEL: No abnormally dilated large or small bowel loops. Likely elongated/redundant appendix along the right pelvic sidewall where it is mildly thickened measuring 11 mm image 104/149. No definite surrounding inflammatory changes. Moderate fecal residue in the proximal colon.   PERITONEUM, RETROPERITONEUM, NODES: No significant free fluid. No free air. No significant retroperitoneal lymphadenopathy.   VESSELS:  Heavy atherosclerotic calcifications. No abdominal aortic aneurysm.   PELVIS: Urinary bladder is moderately distended without focal wall thickening. Presumed surgical absence of the uterus. 1.8 cm cystic structure in anterior right pelvis adjacent to the vagina similar to the prior exam.   ABDOMINAL WALL: Small fat containing  umbilical hernia   BONES: Laminectomy defects and posterior fusion hardware in the lumbar spine with moderate scoliosis, spondylolisthesis and varying levels of sclerosis and disc space narrowing/vacuum discs again seen. Stable subcentimeter sclerotic lesion in the right sacrum.       CHEST:   No CT evidence of PE or other acute chest process. Mild linear probable scarring or atelectasis in the right lung similar to 03/07/2025.   ABDOMEN AND PELVIS:   Mild thickening of the tip of the appendix but without associated inflammatory changes. Early acute appendicitis not entirely excluded. Clinical correlation recommended and if there is any clinical concern for acute appendicitis follow-up imaging suggested.   Distended gallbladder.   Too small to characterize hypodense lesions in both kidneys.   18 mm cystic structure in the right pelvis similar to 06/12/2023. Differential includes Bartholin's gland and Robinson's duct cyst.   Postsurgical and productive/degenerative changes of the lumbar spine with scoliosis and multilevel spondylolisthesis.   Other findings as described above.   MACRO: None.     Signed by: Mae Toney 7/17/2025 3:30 PM Dictation workstation:   [id number]       Assessment:    Appendicitis vs Constipation     Currently patient denies nausea and vomiting. She denies abdominal pain. States she is having some back pain but has chronic back pain.     On exam abdomen is soft, non tender and non distended. Bowel sounds x4 quadrant. No Rovsing sign. No psoas sign. No rebound tenderness. No guarding. Labs on admission unremarkable. Afebrile. From assessment this seems more like constipation than acute appendicitis at this time.       Plan:  -Recommend a bowel regimen to help facilitate a BM  -Dr. Urrutia reviewed imagining and assessed patient and agrees this is not early acute appendicitis from assessment, labs, vitals, and presentation. Okay with patient being discharged and instructed to come back if she  has increasing abdominal pain, nausea, vomiting and fevers.       Further recommendations per Dr. Urrutia     Time spent  60  minutes obtaining labs, imaging, recommendations, interview, assessment, examination, medication review/ordering, and EMR review.    Plan of care was discussed extensively with patient. Patient verbalized understanding through teach back method. All questions and concerns addressed upon examination.     Of note, this documentation is completed using the Dragon Dictation system (voice recognition software). There may be spelling and/or grammatical errors that were not corrected prior to final submission.    Electronically signed by AGUSTIN Orlando on 7/17/2025 at 5:10 PM          [1]   Allergies  Allergen Reactions    Naproxen Hives    Nsaids (Non-Steroidal Anti-Inflammatory Drug) Unknown    Pregabalin Unknown    Sulfa (Sulfonamide Antibiotics) Hives    Tramadol Hives   [2]   Past Medical History:  Diagnosis Date    Allergic 2017    Anemia     Anxiety 1985    Arthritis 2017    Cataract 2018    Colon cancer (Multi) [id number]    COPD (chronic obstructive pulmonary disease) (Multi) 2017    CTS (carpal tunnel syndrome) 2010?    Depression 1985    Diabetes mellitus (Multi) 1986    Difficulty walking     Dysphagia     Headache 1986    HL (hearing loss) 1998    Hypertension 1986    Memory loss     Neuromuscular disorder (Multi)     Neuropathy in diabetes (Multi)     Numbness     Personal history of nicotine dependence 08/12/2021    Personal history of nicotine dependence    Restless leg syndrome     Rheumatoid arthritis     Scoliosis 2020    Sleep apnea     Spinal stenosis 2016    Substance abuse 2015    Thoracic disc disease     Varicella     Visual impairment 1970   [3]   Past Surgical History:  Procedure Laterality Date    BACK SURGERY  2017    BREAST BIOPSY      BREAST CYST EXCISION  1990    BREAST LUMPECTOMY  1990    CARPAL TUNNEL RELEASE  2013    I have an EMG scheduled. My carpal tunnel  "came back and i have a cyst on my right wrist.    CATARACT EXTRACTION  2018    EYE SURGERY  2019    HYSTERECTOMY  1990    JOINT REPLACEMENT      LUMBAR FUSION      Dr. Coyle did surgery on my back,but im not sure what all he did.    MYOMECTOMY      OTHER SURGICAL HISTORY  07/10/2019    Tonsillectomy    OTHER SURGICAL HISTORY  07/10/2019    Back surgery    OTHER SURGICAL HISTORY  07/10/2019    Hysterectomy    OTHER SURGICAL HISTORY  07/10/2019    Carpal tunnel surgery    OTHER SURGICAL HISTORY  01/11/2022    Throat surgery    OTHER SURGICAL HISTORY  01/11/2022    Knee surgery    OTHER SURGICAL HISTORY  01/11/2022    Breast surgery    OTHER SURGICAL HISTORY  01/11/2022    Cataract surgery    OTHER SURGICAL HISTORY  01/11/2022    Toe fracture repair    PLANTAR FASCIA RELEASE  2009    TONSILLECTOMY      TUBAL LIGATION      XR CHEST PACEMAKER WITH FLUORO  04/15/2019    XR CHEST PACEMAKER WITH FLUORO 4/15/2019 [facility]   [4]   Social History  Socioeconomic History    Marital status:    Tobacco Use    Smoking status: Every Day     Current packs/day: 0.50     Average packs/day: 0.5 packs/day for 51.0 years (25.5 ttl pk-yrs)     Types: Cigarettes     Start date: 8/14/1974    Smokeless tobacco: Never    Tobacco comments:     I was introduced to cigarettes in 5th grade and got hooked really, really quick. 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    Drug use: Not Currently     Frequency: 7.0 times per week     Types: \"Crack\" cocaine, Cocaine, Hydrocodone, Marijuana, Morphine, Oxycodone     Comment: I was addicted to more than one substance back then. Clean 7    Sexual activity: Not Currently     Partners: Male     Birth control/protection: Abstinence, Post-menopausal, Other     " Comment: Im 57,i don't need it! I don't have sex anymore. My  spouse     Social Drivers of Health     Financial Resource Strain: Patient Declined (10/4/2024)    Overall Financial Resource Strain (CARDIA)     Difficulty of Paying Living Expenses: Patient declined   Food Insecurity: No Food Insecurity (10/4/2024)    Hunger Vital Sign     Worried About Running Out of Food in the Last Year: Never true     Ran Out of Food in the Last Year: Never true   Transportation Needs: Patient Declined (10/4/2024)    PRAPARE - Transportation     Lack of Transportation (Medical): Patient declined     Lack of Transportation (Non-Medical): Patient declined   Physical Activity: Inactive (10/4/2024)    Exercise Vital Sign     Days of Exercise per Week: 0 days     Minutes of Exercise per Session: 0 min   Stress: Stress Concern Present (10/1/2023)    [employer] - Occupational Stress Questionnaire     Feeling of Stress : Rather much   Intimate Partner Violence: Not At Risk (10/4/2024)    Humiliation, Afraid, Rape, and Kick questionnaire     Fear of Current or Ex-Partner: No     Emotionally Abused: No     Physically Abused: No     Sexually Abused: No   Housing Stability: Patient Declined (10/4/2024)    Housing Stability Vital Sign     Unable to Pay for Housing in the Last Year: Patient declined     Number of Times Moved in the Last Year: 1     Homeless in the Last Year: Patient declined   [5]   Family History  Adopted: Yes   Problem Relation Name Age of Onset    Skin cancer Mother      Other (cardiac disorder) Father Julio C Storey (biological parent)     Alcohol abuse Father Julio C Storey (biological parent)     Diabetes Father Julio C Storey (biological parent)     Depression Father's Sister Valerie Alejandre     Dementia Father's Sister Valerie Alejandre     Diabetes Father's Sister Valerie Alejandre - Gin     Hypertension Father's Sister Valerie Alejandre - Gin     Anxiety disorder Father's Sister Valerie  ELIZABETH Alejandre  Gin     Ovarian cancer Sister Nhi Rodriguez    [6]   Current Facility-Administered Medications:     cyanocobalamin (Vitamin B-12) injection 1,000 mcg, 1,000 mcg, intramuscular, Once, Pineda Bauer, DO    Current Outpatient Medications:     albuterol 2.5 mg /3 mL (0.083 %) nebulizer solution, USE 1 VIAL VIA NEBULIZER EVERY 4 HOURS AS NEEDED FOR WHEEZING or shortness OF breath, Disp: 90 mL, Rfl: 2    albuterol 90 mcg/actuation inhaler, INHALE 1 TO 2 PUFFS EVERY 4 TO 6 HOURS AS NEEDED FOR WHEEZING or SHORTNESS OF BREATH, Disp: 18 g, Rfl: 5    ALPRAZolam (Xanax) 0.5 mg tablet, Take 1 tablet twice a day as needed for 30 days., Disp: , Rfl:     aspirin 81 mg EC tablet, Take 1 tablet (81 mg) by mouth once daily., Disp: , Rfl:     atorvastatin (Lipitor) 80 mg tablet, Take 1 tablet (80 mg) by mouth once daily., Disp: 90 tablet, Rfl: 3    blood sugar diagnostic (OneTouch Ultra Test), 1 strip 4 times a day., Disp: 100 strip, Rfl: 11    blood-glucose meter misc, 1 kit 3 times a day., Disp: 1 each, Rfl: 0    buprenorphine-naloxone (Suboxone) 8-2 mg SL tablet, Place 1 tablet under the tongue 2 times a day., Disp: , Rfl:     cholecalciferol (Vitamin D-3) 50 mcg (2,000 unit) capsule, Take 1 capsule (50 mcg) by mouth once daily at bedtime., Disp: , Rfl:     cyanocobalamin (Vitamin B-12) 1,000 mcg tablet, Take 1 tablet (1,000 mcg) by mouth once daily., Disp: 30 tablet, Rfl: 11    docosahexaenoic acid/epa (FISH OIL ORAL), Take 1,000 mg by mouth in the morning and at bedtime., Disp: , Rfl:     dulaglutide (Trulicity) 3 mg/0.5 mL injection, Inject 3 mg under the skin 1 (one) time per week., Disp: 2 mL, Rfl: 5    gabapentin (Neurontin) 800 mg tablet, Take 1 tablet (800 mg) by mouth 4 times a day., Disp: 120 tablet, Rfl: 3    hydroCHLOROthiazide (Microzide) 12.5 mg tablet, Take 1 tablet (12.5 mg) by mouth once daily., Disp: 90 tablet, Rfl: 5    lidocaine (Lidoderm) 5 % patch, Place 1 patch over 12 hours on the skin once daily.  Remove & discard patch within 12 hours or as directed by MD., Disp: 10 patch, Rfl: 0    losartan (Cozaar) 100 mg tablet, TAKE 1 TABLET BY MOUTH EVERY DAY, Disp: 90 tablet, Rfl: 3    metFORMIN (Glucophage) 1,000 mg tablet, Take 1 tablet (1,000 mg) by mouth 2 times daily (morning and late afternoon)., Disp: 180 tablet, Rfl: 5    nebulizers ( Nebulizer-Unvrsl Tubing) misc, 1 Tube once daily as needed (Change nebulizer tubing as needed)., Disp: 10 each, Rfl: 0    nystatin (Mycostatin) cream, Apply 1 Application topically 2 times a day., Disp: 30 g, Rfl: 3    polyethylene glycol (Glycolax, Miralax) 17 gram/dose powder, Mix 17 g of powder and drink once daily as needed for constipation., Disp: 510 g, Rfl: 1    potassium chloride CR 20 mEq ER tablet, Take 1 tablet (20 mEq) by mouth once daily. Do not crush or chew., Disp: 30 tablet, Rfl: 2    QUEtiapine (SEROquel) 25 mg tablet, TAKE 1 TABLET BY MOUTH EVERY DAY AT BEDTIME for 30 days, Disp: , Rfl:     rOPINIRole (Requip) 1 mg tablet, Take 2 tablets (2 mg) by mouth once daily at bedtime., Disp: 60 tablet, Rfl: 3    Spiriva Respimat 2.5 mcg/actuation inhaler, inhale 2 (TWO) puffs into the lungs EVERY DAY AS DIRECTED, Disp: 16 g, Rfl: 6    venlafaxine XR (Effexor-XR) 75 mg 24 hr capsule, take one every other day, alternate with 150 mg dose for one week then begin 75 mg capsule once daily there after., Disp: , Rfl:     WHEELCHAIR MISC, 1 Device once daily., Disp: , Rfl:

## 2025-07-17 NOTE — DISCHARGE INSTRUCTIONS
Please return to the emergency room for any immediate concerns or worsening.  Otherwise please start bowel regimen as soon as possible and increase your fluid intake.  Please follow-up with your family doctor early next week for recheck.

## 2025-07-17 NOTE — ED PROVIDER NOTES
HPI   Chief Complaint   Patient presents with    Abdominal Pain     SOB since last night, bloated, constipated, sweating, vomitting x3 days. Takes suboxone. Hasn't taken blood pressure or diabetes meds in a few days due to nausea. Was supposed to have abd ultrasound done today but couldn't make it        HPI   Patient is a 59-year-old female who presents with complaints of abdominal bloating distention constipation for 3 days.  She reports some nausea and vomiting with that as well.  She states the constipation is chronic because she is taking Suboxone.  States she has been limited with her medications due to the nausea and vomiting but has taken her blood pressure and diabetes medications.  She states that yesterday she also developed some shortness of breath.  She states that she has COPD but it does not feel the same.  She feels like the abdominal pressure is pushing up on her lungs making it difficult to breathe.  She denies any fevers or chills, cough or congestion.  Denies any chest pain, dizziness visual changes headache.  She also notes some cramping in her left lower extremity.  None currently.  She states that her abdomen does not exactly hurt and she does not need anything for pain at this time.        Patient History   Medical History[1]  Surgical History[2]  Family History[3]  Social History[4]    Physical Exam   ED Triage Vitals [07/17/25 1153]   Temperature Heart Rate Respirations BP   35.7 °C (96.2 °F) 83 20 (!) 167/105      Pulse Ox Temp Source Heart Rate Source Patient Position   98 % Temporal Monitor Lying      BP Location FiO2 (%)     Right arm --       Physical Exam    General: Awake, alert, in no acute distress  Eyes: Gaze conjugate.  No scleral icterus or injection  HENT: Normo-cephalic, atraumatic. No stridor  CV: Regular rate, regular rhythm. Radial pulses 2+ bilaterally.   Equal pulses in all 4 extremities.  No pedal edema appreciated  Resp: Breathing non-labored, speaking in full  sentences.  Clear to auscultation bilaterally  GI:  soft I do not appreciate distention.  There is no rebound or guarding.  Patient has a little bit of left lower quadrant and suprapubic tenderness on exam.  :   No CVA tenderness.  MSK/Extremities: No gross bony deformities. Moving all extremities  Skin: Warm. Appropriate color  Neuro: Alert. Oriented. Face symmetric. Speech is fluent.  Gross strength and sensation intact in b/l UE and LEs  Psych: Appropriate mood and affect    ED Course & MDM   Diagnoses as of 07/17/25 1726   Abdominal distention   Shortness of breath   Hypomagnesemia   Chronic constipation          EKG interpreted by me   At 1305: Normal sinus rhythm at 68 bpm.  No acute ST-T wave changes appreciated.    Labs Reviewed   CBC WITH AUTO DIFFERENTIAL - Abnormal       Result Value    WBC 12.4 (*)     nRBC 0.0      RBC 4.46      Hemoglobin 14.9      Hematocrit 41.6      MCV 93      MCH 33.4      MCHC 35.8      RDW 13.0      Platelets 297      Neutrophils % 64.4      Immature Granulocytes %, Automated 0.4      Lymphocytes % 21.6      Monocytes % 11.2      Eosinophils % 1.8      Basophils % 0.6      Neutrophils Absolute 7.95 (*)     Immature Granulocytes Absolute, Automated 0.05      Lymphocytes Absolute 2.67      Monocytes Absolute 1.39 (*)     Eosinophils Absolute 0.22      Basophils Absolute 0.08     MAGNESIUM - Abnormal    Magnesium 1.27 (*)    COMPREHENSIVE METABOLIC PANEL - Abnormal    Glucose 104 (*)     Sodium 137      Potassium 3.5      Chloride 101      Bicarbonate 27      Anion Gap 13      Urea Nitrogen 17      Creatinine 0.81      eGFR 83      Calcium 9.6      Albumin 4.6      Alkaline Phosphatase 85      Total Protein 7.6      AST 18      Bilirubin, Total 0.6      ALT 23     LIPASE - Normal    Lipase 39      Narrative:     Venipuncture immediately after or during the administration of Metamizole may lead to falsely low results. Testing should be performed immediately prior to Metamizole  dosing.   LACTATE - Normal    Lactate 1.7      Narrative:     Venipuncture immediately after or during the administration of Metamizole may lead to falsely low results. Testing should be performed immediately prior to Metamizole dosing.   B-TYPE NATRIURETIC PEPTIDE - Normal    BNP 10      Narrative:        <100 pg/mL - Heart failure unlikely  100-299 pg/mL - Intermediate probability of acute heart                  failure exacerbation. Correlate with clinical                  context and patient history.    >=300 pg/mL - Heart Failure likely. Correlate with clinical                  context and patient history.    BNP testing is performed using different testing methodology at [facility] than at other [facility]. Direct result comparisons should only be made within the same method.      SERIAL TROPONIN-INITIAL - Normal    Troponin I, High Sensitivity 4      Narrative:     Less than 99th percentile of normal range cutoff-  Female and children under 18 years old <14 ng/L; Male <21 ng/L: Negative  Repeat testing should be performed if clinically indicated.     Female and children under 18 years old 14-50 ng/L; Male 21-50 ng/L:  Consistent with possible cardiac damage and possible increased clinical   risk. Serial measurements may help to assess extent of myocardial damage.     >50 ng/L: Consistent with cardiac damage, increased clinical risk and  myocardial infarction. Serial measurements may help assess extent of   myocardial damage.      NOTE: Children less than 1 year old may have higher baseline troponin   levels and results should be interpreted in conjunction with the overall   clinical context.     NOTE: Troponin I testing is performed using a different   testing methodology at [facility] than at other   [facility]. Direct result comparisons should only   be made within the same method.   SERIAL TROPONIN, 1 HOUR - Normal    Troponin I, High Sensitivity 6      Narrative:      Less than 99th percentile of normal range cutoff-  Female and children under 18 years old <14 ng/L; Male <21 ng/L: Negative  Repeat testing should be performed if clinically indicated.     Female and children under 18 years old 14-50 ng/L; Male 21-50 ng/L:  Consistent with possible cardiac damage and possible increased clinical   risk. Serial measurements may help to assess extent of myocardial damage.     >50 ng/L: Consistent with cardiac damage, increased clinical risk and  myocardial infarction. Serial measurements may help assess extent of   myocardial damage.      NOTE: Children less than 1 year old may have higher baseline troponin   levels and results should be interpreted in conjunction with the overall   clinical context.     NOTE: Troponin I testing is performed using a different   testing methodology at [facility] than at other   [facility]. Direct result comparisons should only   be made within the same method.   TROPONIN SERIES- (INITIAL, 1 HR)    Narrative:     The following orders were created for panel order Troponin I Series, High Sensitivity (0, 1 HR).  Procedure                               Abnormality         Status                     ---------                               -----------         ------                     Troponin I, High Sensiti...[[id number]]  Normal              Final result               Troponin, High Sensitivi...[[id number]]  Normal              Final result                 Please view results for these tests on the individual orders.   URINALYSIS WITH REFLEX CULTURE AND MICROSCOPIC    Narrative:     The following orders were created for panel order Urinalysis with Reflex Culture and Microscopic.  Procedure                               Abnormality         Status                     ---------                               -----------         ------                     Urinalysis with Reflex C...[[id number]]                                                  Extra Urine Gray Tube[[id number]]                                                         Please view results for these tests on the individual orders.   URINALYSIS WITH REFLEX CULTURE AND MICROSCOPIC   EXTRA URINE GRAY TUBE     CT angio chest for pulmonary embolism   Final Result   CHEST:        No CT evidence of PE or other acute chest process. Mild linear   probable scarring or atelectasis in the right lung similar to   03/07/2025.        ABDOMEN AND PELVIS:        Mild thickening of the tip of the appendix but without associated   inflammatory changes. Early acute appendicitis not entirely excluded.   Clinical correlation recommended and if there is any clinical concern   for acute appendicitis follow-up imaging suggested.        Distended gallbladder.        Too small to characterize hypodense lesions in both kidneys.        18 mm cystic structure in the right pelvis similar to 06/12/2023.   Differential includes Bartholin's gland and Robinson's duct cyst.        Postsurgical and productive/degenerative changes of the lumbar spine   with scoliosis and multilevel spondylolisthesis.        Other findings as described above.        MACRO:   None.             Signed by: aMe Toney 7/17/2025 3:30 PM   Dictation workstation:   [id number]      CT abdomen pelvis w IV contrast   Final Result   CHEST:        No CT evidence of PE or other acute chest process. Mild linear   probable scarring or atelectasis in the right lung similar to   03/07/2025.        ABDOMEN AND PELVIS:        Mild thickening of the tip of the appendix but without associated   inflammatory changes. Early acute appendicitis not entirely excluded.   Clinical correlation recommended and if there is any clinical concern   for acute appendicitis follow-up imaging suggested.        Distended gallbladder.        Too small to characterize hypodense lesions in both kidneys.        18 mm cystic structure in the right pelvis similar to 06/12/2023.    Differential includes Bartholin's gland and Robinson's duct cyst.        Postsurgical and productive/degenerative changes of the lumbar spine   with scoliosis and multilevel spondylolisthesis.        Other findings as described above.        MACRO:   None.             Signed by: Mae Toney 7/17/2025 3:30 PM   Dictation workstation:   [id number]                 No data recorded                                 Medical Decision Making   I discussed the plan for workup with the patient and explained that I would order some CAT scans of her chest just to rule out PE pneumonia fluid overload and also a CAT scan of her abdomen pelvis to evaluate for obstruction or just severe fecal impaction.  And also any other abnormalities.  She did have some pain in suprapubic region so I have ordered a urinalysis to evaluate the for this as well.  Forward some cardiac labs and EKG performed as well as to evaluate her shortness of breath.  She does deny any chest pain.  I discussed this plan with her and she is pleased with this.  I explained that I will discuss the results with her and we will make a plan for disposition together.  All questions were answered at this time.      Patient has been unable to provide a urine sample yet at this time.  She states she is feeling slightly better.  I discussed the results with her and explained that her CT shows potentially early acute appendicitis.  I also explained that her magnesium level is low and we will replace that.  On repeat abdominal exam patient does have a little bit of tenderness in the right lower quadrant but she also has tenderness in the left lower quadrant.  She has a large amount of stool burden seen on CT scan and she has chronic constipation related to her Suboxone.     I spoke with Dr. Urrutia our  surgeon on-call, she reviewed the patient's results and she and her team came to examine the patient.  After examining the patient and reviewing the CT scan they do not  believe that she has evidence of acute appendicitis and believe that her pain and symptoms are likely related to the constipation.  They recommend a bowel cleanout and close follow-up. patient has these medications at home already prescribed by GI.  This immediately when she gets home.  She is hungry at this time.  I discussed this plan with the patient and she is eager to go home at this time.  We discussed reasons to return to the emergency room and she voices understanding this plan and agrees.  All questions were answered      Procedure  Procedures       Isela Chatman MD  07/17/25 1224         Isela Chatman MD  07/17/25 1307         [1]   Past Medical History:  Diagnosis Date    Allergic 2017    Anemia     Anxiety 1985    Arthritis 2017    Cataract 2018    Colon cancer (Multi) [id number]    COPD (chronic obstructive pulmonary disease) (Multi) 2017    CTS (carpal tunnel syndrome) 2010?    Depression 1985    Diabetes mellitus (Multi) 1986    Difficulty walking     Dysphagia     Headache 1986    HL (hearing loss) 1998    Hypertension 1986    Memory loss     Neuromuscular disorder (Multi)     Neuropathy in diabetes (Multi)     Numbness     Personal history of nicotine dependence 08/12/2021    Personal history of nicotine dependence    Restless leg syndrome     Rheumatoid arthritis     Scoliosis 2020    Sleep apnea     Spinal stenosis 2016    Substance abuse 2015    Thoracic disc disease     Varicella     Visual impairment 1970   [2]   Past Surgical History:  Procedure Laterality Date    BACK SURGERY  2017    BREAST BIOPSY      BREAST CYST EXCISION  1990    BREAST LUMPECTOMY  1990    CARPAL TUNNEL RELEASE  2013    I have an EMG scheduled. My carpal tunnel came back and i have a cyst on my right wrist.    CATARACT EXTRACTION  2018    EYE SURGERY  2019    HYSTERECTOMY  1990    JOINT REPLACEMENT      LUMBAR FUSION      Dr. Coyle did surgery on my back,but im not sure what all he did.    MYOMECTOMY      OTHER SURGICAL  "HISTORY  07/10/2019    Tonsillectomy    OTHER SURGICAL HISTORY  07/10/2019    Back surgery    OTHER SURGICAL HISTORY  07/10/2019    Hysterectomy    OTHER SURGICAL HISTORY  07/10/2019    Carpal tunnel surgery    OTHER SURGICAL HISTORY  01/11/2022    Throat surgery    OTHER SURGICAL HISTORY  01/11/2022    Knee surgery    OTHER SURGICAL HISTORY  01/11/2022    Breast surgery    OTHER SURGICAL HISTORY  01/11/2022    Cataract surgery    OTHER SURGICAL HISTORY  01/11/2022    Toe fracture repair    PLANTAR FASCIA RELEASE  2009    TONSILLECTOMY      TUBAL LIGATION      XR CHEST PACEMAKER WITH FLUORO  04/15/2019    XR CHEST PACEMAKER WITH FLUORO 4/15/2019 [facility]   [3]   Family History  Adopted: Yes   Problem Relation Name Age of Onset    Skin cancer Mother      Other (cardiac disorder) Father Julio C Storey (biological parent)     Alcohol abuse Father Julio C Storey (biological parent)     Diabetes Father Julio C Storey (biological parent)     Depression Father's Sister Valerie Alejandre     Dementia Father's Sister Valerie ELIZABETH Alejandre     Diabetes Father's Sister Valerie Alejandre - Gin     Hypertension Father's Sister Valerie Alejandre - Gin     Anxiety disorder Father's Sister Vlaerie Greenfield     Ovarian cancer Sister Nhi Rodriguez    [4]   Social History  Tobacco Use    Smoking status: Every Day     Current packs/day: 0.50     Average packs/day: 0.5 packs/day for 51.0 years (25.5 ttl pk-yrs)     Types: Cigarettes     Start date: 8/14/1974    Smokeless tobacco: Never    Tobacco comments:     I was introduced to cigarettes in 5th grade and got hooked really, really quick. 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    Drug use: Not Currently     Frequency: 7.0 times per week     Types: \"Crack\" " cocaine, Cocaine, Hydrocodone, Marijuana, Morphine, Oxycodone     Comment: I was addicted to more than one substance back then. Clean 7        Isela Chatman MD  07/17/25 6649

## 2025-07-18 ENCOUNTER — HOSPITAL ENCOUNTER (OUTPATIENT)
Dept: CARDIOLOGY | Facility: HOSPITAL | Age: 60
Discharge: HOME | End: 2025-07-18
Payer: MEDICARE

## 2025-07-18 LAB
ATRIAL RATE: 68 BPM
P AXIS: 66 DEGREES
P OFFSET: 210 MS
P ONSET: 153 MS
PR INTERVAL: 138 MS
Q ONSET: 222 MS
QRS COUNT: 12 BEATS
QRS DURATION: 88 MS
QT INTERVAL: 416 MS
QTC CALCULATION(BAZETT): 442 MS
QTC FREDERICIA: 434 MS
R AXIS: 68 DEGREES
T AXIS: 78 DEGREES
T OFFSET: 430 MS
VENTRICULAR RATE: 68 BPM

## 2025-07-18 PROCEDURE — 93005 ELECTROCARDIOGRAM TRACING: CPT

## 2025-07-21 ENCOUNTER — TELEPHONE (OUTPATIENT)
Dept: PRIMARY CARE | Facility: CLINIC | Age: 60
End: 2025-07-21
Payer: MEDICARE

## 2025-07-21 NOTE — TELEPHONE ENCOUNTER
"Patient called states when she went to the ED they did an IV. Ms Ruiz states \"they did a good job but something is wrong\". Please advise.   "

## 2025-07-21 NOTE — TELEPHONE ENCOUNTER
Pt states she was recently in ED and got a chest xray. Wants to know about results. Also wondering can she take miralax everyday if needed. Please advise.

## 2025-07-22 NOTE — TELEPHONE ENCOUNTER
Pt did not go to ER. States pain Is getting better. Wants to know did you still want her to report to ER or can she be seen this week. Please advise,

## 2025-07-30 ENCOUNTER — TELEPHONE (OUTPATIENT)
Dept: PRIMARY CARE | Facility: CLINIC | Age: 60
End: 2025-07-30
Payer: MEDICARE

## 2025-07-30 DIAGNOSIS — M48.061 SPINAL STENOSIS OF LUMBAR REGION, UNSPECIFIED WHETHER NEUROGENIC CLAUDICATION PRESENT: Primary | ICD-10-CM

## 2025-07-30 NOTE — TELEPHONE ENCOUNTER
Pt requesting referral for PT evaluation.   For home modifications and lift chair. Please advise.

## 2025-08-03 DIAGNOSIS — L30.4 INTERTRIGO: ICD-10-CM

## 2025-08-03 DIAGNOSIS — E87.6 HYPOKALEMIA: ICD-10-CM

## 2025-08-03 DIAGNOSIS — I10 ESSENTIAL HYPERTENSION, BENIGN: ICD-10-CM

## 2025-08-04 ENCOUNTER — TELEPHONE (OUTPATIENT)
Dept: PRIMARY CARE | Facility: CLINIC | Age: 60
End: 2025-08-04
Payer: MEDICARE

## 2025-08-04 DIAGNOSIS — M47.816 LUMBAR SPONDYLOSIS: Primary | ICD-10-CM

## 2025-08-04 RX ORDER — LOSARTAN POTASSIUM 100 MG/1
100 TABLET ORAL DAILY
Qty: 90 TABLET | Refills: 3 | Status: SHIPPED | OUTPATIENT
Start: 2025-08-04

## 2025-08-04 RX ORDER — POTASSIUM CHLORIDE 20 MEQ/1
20 TABLET, EXTENDED RELEASE ORAL DAILY
Qty: 30 TABLET | Refills: 2 | Status: SHIPPED | OUTPATIENT
Start: 2025-08-04

## 2025-08-04 RX ORDER — NYSTATIN 100000 U/G
1 CREAM TOPICAL 2 TIMES DAILY
Qty: 30 G | Refills: 2 | Status: SHIPPED | OUTPATIENT
Start: 2025-08-04

## 2025-08-04 NOTE — TELEPHONE ENCOUNTER
Spoke to patients  [email or web address] , [phone]    Requesting we place an order for 'PT Evaluation for requisition of DME/ Home modifications for patient needs'.

## 2025-08-05 ENCOUNTER — TELEMEDICINE (OUTPATIENT)
Dept: PRIMARY CARE | Facility: CLINIC | Age: 60
End: 2025-08-05
Payer: MEDICARE

## 2025-08-05 ENCOUNTER — APPOINTMENT (OUTPATIENT)
Dept: ORTHOPEDIC SURGERY | Facility: CLINIC | Age: 60
End: 2025-08-05
Payer: MEDICARE

## 2025-08-05 DIAGNOSIS — K59.09 CONSTIPATION, CHRONIC: Primary | ICD-10-CM

## 2025-08-05 DIAGNOSIS — E83.42 HYPOMAGNESEMIA: ICD-10-CM

## 2025-08-05 PROCEDURE — 3044F HG A1C LEVEL LT 7.0%: CPT | Performed by: FAMILY MEDICINE

## 2025-08-05 PROCEDURE — 4010F ACE/ARB THERAPY RXD/TAKEN: CPT | Performed by: FAMILY MEDICINE

## 2025-08-05 PROCEDURE — G2211 COMPLEX E/M VISIT ADD ON: HCPCS | Performed by: FAMILY MEDICINE

## 2025-08-05 PROCEDURE — 99213 OFFICE O/P EST LOW 20 MIN: CPT | Performed by: FAMILY MEDICINE

## 2025-08-05 ASSESSMENT — ENCOUNTER SYMPTOMS
SHORTNESS OF BREATH: 1
FEVER: 0
CONSTIPATION: 1
COUGH: 0
ABDOMINAL PAIN: 1

## 2025-08-05 NOTE — PROGRESS NOTES
Subjective   Patient ID: Rachael Ruiz is a 59 y.o. female who presents for Abdominal Pain (ER follow up).    Virtual or Telephone Consent    An interactive audio and video telecommunication system which permits real time communications between the patient (at the originating site) and provider (at the distant site) was utilized to provide this telehealth service.   Verbal consent was requested and obtained from Rachael Ruiz on this date, 08/05/25 for a telehealth visit and the patient's location was confirmed at the time of the visit.    Abdominal Pain  Associated symptoms include constipation. Pertinent negatives include no fever.        She is here today to follow-up on visit to ED 7/17/2025 with abdominal bloating  Prior to going to the ED she had a 3-day history of abdominal bloating.  This was accompanied by constipation which had been occurring every 3 days  I reviewed records from the ED:  Magnesium level was low at 1.27.  White blood cell count mildly elevated at 12.4.  Otherwise labs including lipase, CMP, and lactate were all unremarkable.  BNP and troponin were both negative  She had a CT scan of her chest which did not show any PE.  CT abdomen/pelvis showed mild thickening of the tip of the appendix but without any associated inflammatory changes, early acute appendicitis not entirely excluded  She was evaluated by surgery in the ED and they did not feel that she had early acute appendicitis.  She was discharged home  She states that her abdominal bloating has resolved.  She is now taking MiraLAX and constipation has gotten better, and she is now having a daily bowel movement.  Her low back is still bothering her, and she is going to be seeing orthopedics later this month          Review of Systems   Constitutional:  Negative for fever.   Respiratory:  Positive for shortness of breath (When seen in ED). Negative for cough.    Gastrointestinal:  Positive for abdominal pain and constipation.        Objective   There were no vitals taken for this visit.    Physical Exam  Constitutional:       General: She is not in acute distress.     Appearance: Normal appearance. She is well-developed.   Pulmonary:      Effort: Pulmonary effort is normal.     Skin:     Findings: No rash.     Neurological:      Mental Status: She is alert.     Psychiatric:         Mood and Affect: Mood normal.         Behavior: Behavior normal.         Assessment/Plan   Assessment & Plan  Constipation, chronic         Hypomagnesemia    Orders:    Magnesium; Future    Her abdominal bloating has resolved and most likely was related to her chronic constipation.  She is not taking MiraLAX and constipation has improved, and she is now having a daily bowel movement.  Recommended continuing MiraLAX as needed.  Advised to call if symptoms start to worsen, and return to ED if develops any severe abdominal pain  She did have low magnesium when seen in the ED which was treated.  We will recheck this to confirm it has improved in normal range  She has a follow-up next month and we will plan on rechecking her symptoms at that time

## 2025-08-14 ENCOUNTER — EVALUATION (OUTPATIENT)
Dept: PHYSICAL THERAPY | Facility: CLINIC | Age: 60
End: 2025-08-14
Payer: MEDICARE

## 2025-08-14 DIAGNOSIS — R26.2 DIFFICULTY WALKING: ICD-10-CM

## 2025-08-14 DIAGNOSIS — M62.81 WEAKNESS OF BOTH QUADRICEPS MUSCLES: ICD-10-CM

## 2025-08-14 DIAGNOSIS — M54.50 LOW BACK PAIN: Primary | ICD-10-CM

## 2025-08-14 DIAGNOSIS — Z74.09 DECREASED MOBILITY IN BED: ICD-10-CM

## 2025-08-14 PROCEDURE — 97530 THERAPEUTIC ACTIVITIES: CPT | Mod: GP

## 2025-08-14 PROCEDURE — 97163 PT EVAL HIGH COMPLEX 45 MIN: CPT | Mod: GP

## 2025-08-14 PROCEDURE — 97162 PT EVAL MOD COMPLEX 30 MIN: CPT | Mod: GP

## 2025-08-14 PROCEDURE — 97110 THERAPEUTIC EXERCISES: CPT | Mod: GP

## 2025-08-20 ENCOUNTER — APPOINTMENT (OUTPATIENT)
Dept: PHYSICAL THERAPY | Facility: CLINIC | Age: 60
End: 2025-08-20
Payer: MEDICARE

## 2025-08-27 ENCOUNTER — APPOINTMENT (OUTPATIENT)
Dept: PHYSICAL THERAPY | Facility: CLINIC | Age: 60
End: 2025-08-27
Payer: MEDICARE

## 2025-08-29 ENCOUNTER — APPOINTMENT (OUTPATIENT)
Dept: ORTHOPEDIC SURGERY | Facility: CLINIC | Age: 60
End: 2025-08-29
Payer: MEDICARE

## 2025-09-12 ENCOUNTER — APPOINTMENT (OUTPATIENT)
Dept: ORTHOPEDIC SURGERY | Facility: CLINIC | Age: 60
End: 2025-09-12
Payer: MEDICARE

## 2025-09-17 ENCOUNTER — APPOINTMENT (OUTPATIENT)
Dept: PRIMARY CARE | Facility: CLINIC | Age: 60
End: 2025-09-17
Payer: MEDICARE

## 2025-09-24 ENCOUNTER — APPOINTMENT (OUTPATIENT)
Dept: NEUROLOGY | Facility: CLINIC | Age: 60
End: 2025-09-24
Payer: MEDICARE

## 2025-10-21 ENCOUNTER — APPOINTMENT (OUTPATIENT)
Dept: NEUROLOGY | Facility: CLINIC | Age: 60
End: 2025-10-21
Payer: MEDICARE